# Patient Record
Sex: MALE | Race: WHITE | Employment: OTHER
[De-identification: names, ages, dates, MRNs, and addresses within clinical notes are randomized per-mention and may not be internally consistent; named-entity substitution may affect disease eponyms.]

---

## 2017-02-06 ENCOUNTER — OFFICE VISIT (OUTPATIENT)
Facility: CLINIC | Age: 62
End: 2017-02-06

## 2017-02-06 VITALS
SYSTOLIC BLOOD PRESSURE: 150 MMHG | WEIGHT: 259 LBS | RESPIRATION RATE: 16 BRPM | OXYGEN SATURATION: 98 % | DIASTOLIC BLOOD PRESSURE: 82 MMHG | HEART RATE: 67 BPM | BODY MASS INDEX: 37.16 KG/M2

## 2017-02-06 DIAGNOSIS — Z13.9 SCREENING: ICD-10-CM

## 2017-02-06 DIAGNOSIS — J44.9 CHRONIC OBSTRUCTIVE PULMONARY DISEASE, UNSPECIFIED COPD TYPE (HCC): ICD-10-CM

## 2017-02-06 DIAGNOSIS — E78.00 PURE HYPERCHOLESTEROLEMIA: ICD-10-CM

## 2017-02-06 DIAGNOSIS — F41.9 ANXIETY: ICD-10-CM

## 2017-02-06 DIAGNOSIS — R07.89 CHEST PRESSURE: ICD-10-CM

## 2017-02-06 DIAGNOSIS — R73.9 HYPERGLYCEMIA: ICD-10-CM

## 2017-02-06 DIAGNOSIS — K21.9 GASTROESOPHAGEAL REFLUX DISEASE WITHOUT ESOPHAGITIS: ICD-10-CM

## 2017-02-06 DIAGNOSIS — I10 ESSENTIAL HYPERTENSION: Primary | ICD-10-CM

## 2017-02-06 PROCEDURE — 99214 OFFICE O/P EST MOD 30 MIN: CPT | Performed by: NURSE PRACTITIONER

## 2017-02-06 ASSESSMENT — ENCOUNTER SYMPTOMS
RESPIRATORY NEGATIVE: 1
EYES NEGATIVE: 1
GASTROINTESTINAL NEGATIVE: 1
ALLERGIC/IMMUNOLOGIC NEGATIVE: 1

## 2017-08-04 ENCOUNTER — HOSPITAL ENCOUNTER (OUTPATIENT)
Age: 62
Setting detail: SPECIMEN
Discharge: HOME | End: 2017-08-04
Payer: COMMERCIAL

## 2017-08-04 DIAGNOSIS — E78.00 PURE HYPERCHOLESTEROLEMIA: ICD-10-CM

## 2017-08-04 DIAGNOSIS — I10 ESSENTIAL HYPERTENSION: ICD-10-CM

## 2017-08-04 DIAGNOSIS — Z13.9 SCREENING FOR CONDITION: ICD-10-CM

## 2017-08-04 DIAGNOSIS — R73.9 HYPERGLYCEMIA: ICD-10-CM

## 2017-08-04 LAB
ALBUMIN SERPL-MCNC: 4.7 G/DL (ref 3.5–5.2)
ALBUMIN/GLOBULIN RATIO: 1.6 (ref 1–2.5)
ALP BLD-CCNC: 70 U/L (ref 40–129)
ALT SERPL-CCNC: 36 U/L (ref 5–41)
ANION GAP SERPL CALCULATED.3IONS-SCNC: 14 MMOL/L (ref 9–17)
AST SERPL-CCNC: 31 U/L
BILIRUB SERPL-MCNC: 0.7 MG/DL (ref 0.3–1.2)
BUN BLDV-MCNC: 10 MG/DL (ref 8–23)
BUN/CREAT BLD: ABNORMAL (ref 9–20)
CALCIUM SERPL-MCNC: 9.3 MG/DL (ref 8.6–10.4)
CHLORIDE BLD-SCNC: 99 MMOL/L (ref 98–107)
CHOLESTEROL/HDL RATIO: 2.7
CHOLESTEROL: 188 MG/DL
CO2: 27 MMOL/L (ref 20–31)
CREAT SERPL-MCNC: 0.81 MG/DL (ref 0.7–1.2)
ESTIMATED AVERAGE GLUCOSE: 100 MG/DL
GFR AFRICAN AMERICAN: >60 ML/MIN
GFR NON-AFRICAN AMERICAN: >60 ML/MIN
GFR SERPL CREATININE-BSD FRML MDRD: ABNORMAL ML/MIN/{1.73_M2}
GFR SERPL CREATININE-BSD FRML MDRD: ABNORMAL ML/MIN/{1.73_M2}
GLUCOSE BLD-MCNC: 107 MG/DL (ref 70–99)
HBA1C MFR BLD: 5.1 % (ref 4–6)
HCT VFR BLD CALC: 45.6 % (ref 41–53)
HDLC SERPL-MCNC: 70 MG/DL
HEMOGLOBIN: 15.5 G/DL (ref 13.5–17.5)
LDL CHOLESTEROL: 93 MG/DL (ref 0–130)
MCH RBC QN AUTO: 31.6 PG (ref 26–34)
MCHC RBC AUTO-ENTMCNC: 33.9 G/DL (ref 31–37)
MCV RBC AUTO: 93 FL (ref 80–100)
PDW BLD-RTO: 13.2 % (ref 12.5–15.4)
PLATELET # BLD: 302 K/UL (ref 140–450)
PMV BLD AUTO: 8.1 FL (ref 6–12)
POTASSIUM SERPL-SCNC: 5 MMOL/L (ref 3.7–5.3)
PROSTATE SPECIFIC ANTIGEN: 1.41 UG/L
RBC # BLD: 4.9 M/UL (ref 4.5–5.9)
SODIUM BLD-SCNC: 140 MMOL/L (ref 135–144)
TOTAL PROTEIN: 7.7 G/DL (ref 6.4–8.3)
TRIGL SERPL-MCNC: 126 MG/DL
VLDLC SERPL CALC-MCNC: NORMAL MG/DL (ref 1–30)
WBC # BLD: 8.4 K/UL (ref 3.5–11)

## 2017-08-09 PROBLEM — H93.19 TINNITUS AURIUM: Status: ACTIVE | Noted: 2017-08-09

## 2017-08-09 PROBLEM — I10 ESSENTIAL HYPERTENSION: Status: ACTIVE | Noted: 2017-08-09

## 2017-08-09 PROBLEM — E78.00 PURE HYPERCHOLESTEROLEMIA: Status: ACTIVE | Noted: 2017-08-09

## 2017-08-09 PROBLEM — H91.93 BILATERAL HEARING LOSS: Status: ACTIVE | Noted: 2017-08-09

## 2017-08-09 PROBLEM — J44.9 CHRONIC OBSTRUCTIVE PULMONARY DISEASE (HCC): Status: ACTIVE | Noted: 2017-08-09

## 2017-09-22 ENCOUNTER — HOSPITAL ENCOUNTER (OUTPATIENT)
Age: 62
Discharge: HOME | End: 2017-09-22
Payer: COMMERCIAL

## 2017-09-22 ENCOUNTER — HOSPITAL ENCOUNTER (OUTPATIENT)
Dept: GENERAL RADIOLOGY | Age: 62
Discharge: HOME | End: 2017-09-22
Payer: COMMERCIAL

## 2017-09-22 DIAGNOSIS — J44.9 CHRONIC OBSTRUCTIVE PULMONARY DISEASE, UNSPECIFIED COPD TYPE (HCC): ICD-10-CM

## 2017-09-22 DIAGNOSIS — I10 ESSENTIAL HYPERTENSION: ICD-10-CM

## 2017-09-22 DIAGNOSIS — R07.89 CHEST PRESSURE: ICD-10-CM

## 2017-09-22 DIAGNOSIS — R05.9 COUGH: ICD-10-CM

## 2017-09-22 PROCEDURE — 71020 XR CHEST STANDARD TWO VW: CPT

## 2017-11-01 PROBLEM — J44.9 CHRONIC OBSTRUCTIVE PULMONARY DISEASE (HCC): Status: ACTIVE | Noted: 2017-11-01

## 2017-11-05 DIAGNOSIS — E78.00 PURE HYPERCHOLESTEROLEMIA: ICD-10-CM

## 2017-11-06 RX ORDER — SIMVASTATIN 20 MG
TABLET ORAL
Qty: 90 TABLET | Refills: 3 | Status: SHIPPED | OUTPATIENT
Start: 2017-11-06 | End: 2018-11-01 | Stop reason: SDUPTHER

## 2018-01-03 ENCOUNTER — TELEPHONE (OUTPATIENT)
Dept: PULMONOLOGY | Age: 63
End: 2018-01-03

## 2018-01-03 ENCOUNTER — OFFICE VISIT (OUTPATIENT)
Dept: PULMONOLOGY | Age: 63
End: 2018-01-03
Payer: COMMERCIAL

## 2018-01-03 VITALS
WEIGHT: 255 LBS | HEIGHT: 73 IN | BODY MASS INDEX: 35.7 KG/M2 | HEIGHT: 71 IN | OXYGEN SATURATION: 99 % | TEMPERATURE: 97.1 F | BODY MASS INDEX: 33.8 KG/M2 | SYSTOLIC BLOOD PRESSURE: 154 MMHG | DIASTOLIC BLOOD PRESSURE: 79 MMHG | HEART RATE: 76 BPM | RESPIRATION RATE: 14 BRPM | OXYGEN SATURATION: 99 % | HEART RATE: 79 BPM | WEIGHT: 255 LBS

## 2018-01-03 DIAGNOSIS — E66.9 OBESITY (BMI 30-39.9): ICD-10-CM

## 2018-01-03 DIAGNOSIS — J44.9 CHRONIC OBSTRUCTIVE PULMONARY DISEASE, UNSPECIFIED COPD TYPE (HCC): Primary | ICD-10-CM

## 2018-01-03 DIAGNOSIS — F17.211 NICOTINE DEPENDENCE, CIGARETTES, IN REMISSION: ICD-10-CM

## 2018-01-03 DIAGNOSIS — R06.09 DYSPNEA ON EXERTION: ICD-10-CM

## 2018-01-03 DIAGNOSIS — G47.33 OSA (OBSTRUCTIVE SLEEP APNEA): ICD-10-CM

## 2018-01-03 PROCEDURE — 94060 EVALUATION OF WHEEZING: CPT | Performed by: INTERNAL MEDICINE

## 2018-01-03 PROCEDURE — 94726 PLETHYSMOGRAPHY LUNG VOLUMES: CPT | Performed by: INTERNAL MEDICINE

## 2018-01-03 PROCEDURE — 99204 OFFICE O/P NEW MOD 45 MIN: CPT | Performed by: INTERNAL MEDICINE

## 2018-01-03 PROCEDURE — 94729 DIFFUSING CAPACITY: CPT | Performed by: INTERNAL MEDICINE

## 2018-01-03 PROCEDURE — G0296 VISIT TO DETERM LDCT ELIG: HCPCS | Performed by: INTERNAL MEDICINE

## 2018-01-03 ASSESSMENT — SLEEP AND FATIGUE QUESTIONNAIRES
ESS TOTAL SCORE: 0
HOW LIKELY ARE YOU TO NOD OFF OR FALL ASLEEP WHEN YOU ARE A PASSENGER IN A CAR FOR AN HOUR WITHOUT A BREAK: 0
HOW LIKELY ARE YOU TO NOD OFF OR FALL ASLEEP WHILE LYING DOWN TO REST IN THE AFTERNOON WHEN CIRCUMSTANCES PERMIT: 0
HOW LIKELY ARE YOU TO NOD OFF OR FALL ASLEEP IN A CAR, WHILE STOPPED FOR A FEW MINUTES IN TRAFFIC: 0
HOW LIKELY ARE YOU TO NOD OFF OR FALL ASLEEP WHILE WATCHING TV: 0
HOW LIKELY ARE YOU TO NOD OFF OR FALL ASLEEP WHILE SITTING AND TALKING TO SOMEONE: 0
HOW LIKELY ARE YOU TO NOD OFF OR FALL ASLEEP WHILE SITTING QUIETLY AFTER LUNCH WITHOUT ALCOHOL: 0
HOW LIKELY ARE YOU TO NOD OFF OR FALL ASLEEP WHILE SITTING INACTIVE IN A PUBLIC PLACE: 0
HOW LIKELY ARE YOU TO NOD OFF OR FALL ASLEEP WHILE SITTING AND READING: 0

## 2018-01-03 NOTE — PROGRESS NOTES
tiredness during the daytime and he denies any headache in the morning. He denies unrefreshed sleep he does have history of weight gain. He denies forgetfulness or decreased concentration. Goes to sleep at 9 pm, wakes up 6 am. It takes few minutes to fall asleep. Wakes up 2 times at night to go to bathroom. . Takes no nap during the day. No car wrecks or near wrecks because of the sleepiness. No nodding off while driving. ++ weight gain. No nasal congestion or obstruction at night. No numbness or burning in leg or feet. No leg aches or cramps . No loss of muscle strength when angry or laugh. No hallucination when dozing off or waking up from sleep. No paralysis upon awakening from sleep or going to sleep. No teeth grinding, nightmares, sleep walking. No night time panic attacks.      ESS is     Sleep Medicine 1/3/2018   Sitting and reading 0   Watching TV 0   Sitting, inactive in a public place (e.g. a theatre or a meeting) 0   As a passenger in a car for an hour without a break 0   Lying down to rest in the afternoon when circumstances permit 0   Sitting and talking to someone 0   Sitting quietly after a lunch without alcohol 0   In a car, while stopped for a few minutes in traffic 0   Total score 0       Past Medical History:        Diagnosis Date    Allergic rhinitis     Anxiety     Gilliam esophagus     Bleeding ulcer     COPD (chronic obstructive pulmonary disease) ([address]     Environmental allergies     GERD (gastroesophageal reflux disease)     Hyperlipidemia     Hyperplastic colon polyp     Hypertension     Perforated stomach ([address]     Tobacco abuse     Tubular adenoma of colon 1/2012     Dr. Antony Lowe colonoscopy       Past Surgical History:        Procedure Laterality Date    APPENDECTOMY      COLONOSCOPY      COLONOSCOPY  01/06/2012    STOMACH SURGERY      Perforated Stomach repair    TONSILLECTOMY      UMBILICAL HERNIA REPAIR  7/2011    UPPER GASTROINTESTINAL ENDOSCOPY Allergies: Allergies   Allergen Reactions    Prochlorperazine Edisylate      seizure    Reglan [Metoclopramide Hcl]      seizure         Home Meds:   Outpatient Encounter Prescriptions as of 1/3/2018   Medication Sig Dispense Refill    simvastatin (ZOCOR) 20 MG tablet TAKE 1 TABLET NIGHTLY 90 tablet 3    amLODIPine (NORVASC) 10 MG tablet TAKE 1 TABLET DAILY 90 tablet 3    lansoprazole (PREVACID) 30 MG delayed release capsule Take 1 capsule by mouth daily 90 capsule 3    fluticasone (FLONASE) 50 MCG/ACT nasal spray USE 2 SPRAYS NASALLY DAILY AS NEEDED FOR RHINITIS 48 g 5    ALPRAZolam (XANAX) 0.25 MG tablet Take 1 tablet by mouth every 12 hours as needed for Anxiety 180 tablet 0    aspirin 81 MG tablet Take 81 mg by mouth daily.  [DISCONTINUED] Omega-3 Fatty Acids (FISH OIL PO) Take by mouth      [DISCONTINUED] Tiotropium Bromide Monohydrate (SPIRIVA RESPIMAT) 2.5 MCG/ACT AERS Inhale 2 puffs into the lungs Daily 1 Inhaler 11     Facility-Administered Encounter Medications as of 1/3/2018   Medication Dose Route Frequency Provider Last Rate Last Dose    triamcinolone acetonide (KENALOG-40) injection 40 mg  40 mg Intramuscular Once Chrissy Stone CNP           Social History:   TOBACCO:   reports that he quit smoking about 3 years ago. He has a 40.00 pack-year smoking history. 1 PPD, He has never used smokeless tobacco.  ETOH:   reports that he drinks alcohol.   OCCUPATION:  Refinery worker operational coordinator, office work    Family History:       Problem Relation Age of Onset    Cancer Mother      thyroid    Thyroid Disease Sister     Heart Attack Paternal Grandfather        Immunizations:    Immunization History   Administered Date(s) Administered    Influenza Vaccine, unspecified formulation 11/07/2016    Influenza Virus Vaccine 10/30/2013, 10/15/2014, 10/16/2015, 10/25/2017    Pneumococcal Polysaccharide ([id number]) 12/08/2015    Tdap (Boostrix, Adacel) 06/06/2012         REVIEW OF SYSTEMS:  CONSTITUTIONAL:  negative for  fevers, chills, sweats, fatigue, malaise, anorexia and weight loss  EYES:  negative for  double vision, blurred vision, dry eyes, eye discharge, visual disturbance, irritation, redness and icterus  HEENT:  negative for  hearing loss, ear drainage, nasal congestion, epistaxis, sore mouth, sore throat and hoarseness  RESPIRATORY:  negative for  dyspnea, wheezing, hemoptysis, chest pain, pleuritic pain and cyanosis  CARDIOVASCULAR:  negative for  chest pain, palpitations, orthopnea, PND, exertional chest pressure/discomfort, fatigue, early saiety, edema, syncope  GASTROINTESTINAL:  positive for reflux  negative for nausea, vomiting, change in bowel habits, diarrhea, constipation, abdominal pain, abdominal mass, abdominal distention, dysphagia, regurgitation, odynophagia, hematemesis and hemtochezia  GENITOURINARY:  negative for frequency, dysuria, nocturia, urinary incontinence, hesitancy, decreased stream and hematuria  HEMATOLOGIC/LYMPHATIC:  negative for easy bruising, bleeding, lymphadenopathy and petechiae  ALLERGIC/IMMUNOLOGIC:  negative for recurrent infections, urticaria, hay fever, angioedema, anaphylaxis and drug reactions  ENDOCRINE:  negative for heat intolerance, cold intolerance, tremor, change in bowel habits and hair loss  MUSCULOSKELETAL:  negative for  myalgias, arthralgias, joint swelling, stiff joints and decreased range of motion  NEUROLOGICAL:  negative for headaches, dizziness, seizures, memory problems, speech problems, visual disturbance, coordination problems, gait problems, tremor, dysphagia, weakness, numbness, syncope and tingling  BEHAVIOR/PSYCH:  negative          Physical Exam:    Vitals: BP (!) 154/79 (Site: Left Arm, Position: Sitting)   Pulse 79   Temp 97.1 °F (36.2 °C)   Resp 14 Comment: room air  Ht 5' 11\" (1.803 m)   Wt 255 lb (115.7 kg)   SpO2 99%   BMI 35.57 kg/m²   Last 3 weights:    Wt Readings from Last 3 Encounters:   01/03/18 255 lb (115.7 kg)   01/03/18 255 lb (115.7 kg)   11/01/17 252 lb (114.3 kg)     Body mass index is 35.57 kg/m².     Physical Examination:   General appearance - alert, well appearing, and in no distress, overweight and acyanotic, in no respiratory distress  Mental status - alert, oriented to person, place, and time  Eyes - pupils equal and reactive, extraocular eye movements intact, sclera anicteric  Ears - right ear normal, left ear normal  Nose - normal and patent, no erythema, discharge or polyps, large tongue, small oropharynx, Mallampati 2  Mouth - mucous membranes moist, pharynx normal without lesions  Neck - supple, no significant adenopathy, very short and thick neck  Chest - clear to auscultation, no wheezes, rales or rhonchi, symmetric air entry, no tachypnea, retractions or cyanosis  Heart - normal rate, regular rhythm, normal S1, S2, no murmurs, rubs, clicks or gallops  Abdomen - soft, nontender, nondistended, no masses or organomegaly  Neurological - alert, oriented, normal speech, no focal findings or movement disorder noted}  Extremities - peripheral pulses normal, no pedal edema, no clubbing or cyanosis  Skin - normal coloration and turgor, no rashes, no suspicious skin lesions noted       LABS:    CBC:   WBC   Date Value Ref Range Status   08/04/2017 8.4 3.5 - 11.0 k/uL Final   07/28/2016 9.2 3.5 - 11.0 k/uL Final   01/19/2013 11.1 (H) 3.5 - 11.0 k/uL Final     Hemoglobin   Date Value Ref Range Status   08/04/2017 15.5 13.5 - 17.5 g/dL Final   07/28/2016 15.1 13.5 - 17.5 g/dL Final   01/19/2013 16.4 13.5 - 17.5 g/dL Final     Platelet Count   Date Value Ref Range Status   12/05/2011 302 140 - 450 k/uL Final     Platelets   Date Value Ref Range Status   08/04/2017 302 140 - 450 k/uL Final   07/28/2016 307 150 - 450 k/uL Final   01/19/2013 293 140 - 450 k/uL Final     BMP:   Sodium   Date Value Ref Range Status   08/04/2017 140 135 - 144 mmol/L Final   02/03/2017 142 135 - 144 mmol/L Final   07/28/2016 Comment:        Cholesterol Guidelines:      <200  Desirable   200-240  Borderline      >240  Undesirable          HDL   Date Value Ref Range Status   08/04/2017 70 >40 mg/dL Final     Comment:        HDL Guidelines:    <40     Undesirable   40-59    Borderline    >59     Desirable            INR: No results found for: INR  Thyroid: No results found for: TSH  Urinalysis: No results found for: BACTERIA, BLOODU, CLARITYU, COLORU, PHUR, PROTEINU, RBCUA, SPECGRAV, BILIRUBINUR, NITRU, WBCUA, LEUKOCYTESUR, GLUCOSEU  Cultures:-  -----------------------------------------------------------------    Immunization History   Administered Date(s) Administered    Influenza Vaccine, unspecified formulation 11/07/2016    Influenza Virus Vaccine 10/30/2013, 10/15/2014, 10/16/2015, 10/25/2017    Pneumococcal Polysaccharide ([id number]) 12/08/2015    Tdap (Boostrix, Adacel) 06/06/2012     ABGs: No results found for: PHART, PO2ART, [id number]    Pulmonary Functions Testing Results:    1/3/2017: FEV1 2.0 752%, FVC 3. 4/7/70 per, [id number] 74%, TLC 7.4 102%, DLCO 25.25  94%    CXR 9/22/2017  The cardiomediastinal silhouette is unremarkable.  Aortic vascular   calcification.  The lungs are clear.  No infiltrate, pleural fluid or   failure.  Healed left-sided rib fractures.  No acute osseous findings         CT Scans      EKG:   ECHO:   Stress Test:     Assessment and Plan       ICD-10-CM ICD-9-CM    1. Chronic obstructive pulmonary disease, unspecified COPD type ([facility] J44.9 496    2. Dyspnea on exertion R06.09 786.09    3. DEIRDRE (obstructive sleep apnea) G47.33 327.23    4. Obesity (BMI 30-39. 9) E66.9 278.00        Patient Active Problem List   Diagnosis    Allergic rhinitis    Gilliam esophagus    GERD (gastroesophageal reflux disease)    History of smoking    Essential hypertension    Pure hypercholesterolemia     Plan and recommendations    I had discussion with the patient today.   I reviewed pulmonary function test finding and that screen-detected lung cancer cases are over diagnosedthat is, the cancer would not have been detected in the patient's lifetime without the screening. Need for follow up screens annually to continue lung cancer screening effectiveness     Risks associated with radiation from annual LDCT- Radiation exposure is about the same as for a mammogram, which is about 1/3 of the annual background radiation exposure from everyday life. Starting screening at age 54 is not likely to increase cancer risk from radiation exposure. Patients with comorbidities resulting in life expectancy of < 10 years, or that would preclude treatment of an abnormality identified on CT, should not be screened due to lack of benefit.     To obtain maximal benefit from this screening, smoking cessation and long-term abstinence from smoking is critical

## 2018-01-04 ENCOUNTER — TELEPHONE (OUTPATIENT)
Dept: CASE MANAGEMENT | Age: 63
End: 2018-01-04

## 2018-01-04 NOTE — TELEPHONE ENCOUNTER
Order received for CT lung screening. EMR and order reviewed. Scheduling notified to contact patient and schedule. Information regarding ct lung screening mailed to patient.

## 2018-01-04 NOTE — TELEPHONE ENCOUNTER
Patient was seen in the office yesterday and we received a fax from [employer] script that Dr. Starla Crigler sent a script for Spiriva Respimat for one month supply with 11 refills and it needs to be a 3 month supply with 3 refills. New script is attached please sign.

## 2018-02-05 ENCOUNTER — HOSPITAL ENCOUNTER (OUTPATIENT)
Age: 63
Setting detail: SPECIMEN
Discharge: HOME | End: 2018-02-05
Payer: COMMERCIAL

## 2018-02-05 DIAGNOSIS — E78.00 PURE HYPERCHOLESTEROLEMIA: ICD-10-CM

## 2018-02-05 DIAGNOSIS — R73.9 HYPERGLYCEMIA: ICD-10-CM

## 2018-02-05 LAB
ALBUMIN SERPL-MCNC: 4.4 G/DL (ref 3.5–5.2)
ALBUMIN/GLOBULIN RATIO: 1.4 (ref 1–2.5)
ALP BLD-CCNC: 71 U/L (ref 40–129)
ALT SERPL-CCNC: 20 U/L (ref 5–41)
ANION GAP SERPL CALCULATED.3IONS-SCNC: 14 MMOL/L (ref 9–17)
AST SERPL-CCNC: 20 U/L
BILIRUB SERPL-MCNC: 1.01 MG/DL (ref 0.3–1.2)
BUN BLDV-MCNC: 17 MG/DL (ref 8–23)
BUN/CREAT BLD: ABNORMAL (ref 9–20)
CALCIUM SERPL-MCNC: 9.5 MG/DL (ref 8.6–10.4)
CHLORIDE BLD-SCNC: 98 MMOL/L (ref 98–107)
CHOLESTEROL/HDL RATIO: 2.6
CHOLESTEROL: 178 MG/DL
CO2: 27 MMOL/L (ref 20–31)
CREAT SERPL-MCNC: 0.76 MG/DL (ref 0.7–1.2)
ESTIMATED AVERAGE GLUCOSE: 91 MG/DL
GFR AFRICAN AMERICAN: >60 ML/MIN
GFR NON-AFRICAN AMERICAN: >60 ML/MIN
GFR SERPL CREATININE-BSD FRML MDRD: ABNORMAL ML/MIN/{1.73_M2}
GFR SERPL CREATININE-BSD FRML MDRD: ABNORMAL ML/MIN/{1.73_M2}
GLUCOSE BLD-MCNC: 107 MG/DL (ref 70–99)
HBA1C MFR BLD: 4.8 % (ref 4–6)
HDLC SERPL-MCNC: 69 MG/DL
LDL CHOLESTEROL: 81 MG/DL (ref 0–130)
POTASSIUM SERPL-SCNC: 4.5 MMOL/L (ref 3.7–5.3)
SODIUM BLD-SCNC: 139 MMOL/L (ref 135–144)
TOTAL PROTEIN: 7.5 G/DL (ref 6.4–8.3)
TRIGL SERPL-MCNC: 139 MG/DL
VLDLC SERPL CALC-MCNC: NORMAL MG/DL (ref 1–30)

## 2018-02-09 PROBLEM — C44.619 BASAL CELL CARCINOMA OF LEFT UPPER ARM: Status: ACTIVE | Noted: 2018-02-09

## 2018-02-09 PROBLEM — M25.511 ACUTE PAIN OF RIGHT SHOULDER: Status: ACTIVE | Noted: 2018-02-09

## 2018-03-26 ENCOUNTER — HOSPITAL ENCOUNTER (OUTPATIENT)
Dept: CT IMAGING | Age: 63
Discharge: HOME | End: 2018-03-28
Payer: COMMERCIAL

## 2018-03-26 DIAGNOSIS — F17.211 NICOTINE DEPENDENCE, CIGARETTES, IN REMISSION: ICD-10-CM

## 2018-03-26 PROCEDURE — G0297 LDCT FOR LUNG CA SCREEN: HCPCS

## 2018-03-29 ENCOUNTER — TELEPHONE (OUTPATIENT)
Dept: CASE MANAGEMENT | Age: 63
End: 2018-03-29

## 2018-03-29 NOTE — TELEPHONE ENCOUNTER
Ct lung screening complete lungRADS 3: recommend 6m f/u. Noted patient has ov with Dr. Angelica Andrew 4/3/18.   Letter to patient cc to provider

## 2018-04-03 ENCOUNTER — OFFICE VISIT (OUTPATIENT)
Dept: PULMONOLOGY | Age: 63
End: 2018-04-03
Payer: COMMERCIAL

## 2018-04-03 VITALS
BODY MASS INDEX: 36.12 KG/M2 | RESPIRATION RATE: 15 BRPM | OXYGEN SATURATION: 94 % | SYSTOLIC BLOOD PRESSURE: 143 MMHG | HEIGHT: 71 IN | WEIGHT: 258 LBS | TEMPERATURE: 98.7 F | HEART RATE: 78 BPM | DIASTOLIC BLOOD PRESSURE: 91 MMHG

## 2018-04-03 DIAGNOSIS — Z87.891 EX-SMOKER: ICD-10-CM

## 2018-04-03 DIAGNOSIS — J44.9 CHRONIC OBSTRUCTIVE PULMONARY DISEASE, UNSPECIFIED COPD TYPE (HCC): Primary | ICD-10-CM

## 2018-04-03 DIAGNOSIS — R91.1 LUNG NODULE: ICD-10-CM

## 2018-04-03 PROCEDURE — 99214 OFFICE O/P EST MOD 30 MIN: CPT | Performed by: INTERNAL MEDICINE

## 2018-08-11 ENCOUNTER — HOSPITAL ENCOUNTER (OUTPATIENT)
Age: 63
Discharge: HOME | End: 2018-08-11
Payer: COMMERCIAL

## 2018-08-11 DIAGNOSIS — R73.9 HYPERGLYCEMIA: ICD-10-CM

## 2018-08-11 DIAGNOSIS — I10 ESSENTIAL HYPERTENSION: ICD-10-CM

## 2018-08-11 DIAGNOSIS — Z12.5 PROSTATE CANCER SCREENING: ICD-10-CM

## 2018-08-11 DIAGNOSIS — E78.00 PURE HYPERCHOLESTEROLEMIA: ICD-10-CM

## 2018-08-11 LAB
ALBUMIN SERPL-MCNC: 4.4 G/DL (ref 3.5–5.2)
ALBUMIN/GLOBULIN RATIO: NORMAL (ref 1–2.5)
ALP BLD-CCNC: 75 U/L (ref 40–129)
ALT SERPL-CCNC: 36 U/L (ref 5–41)
ANION GAP SERPL CALCULATED.3IONS-SCNC: 12 MMOL/L (ref 9–17)
AST SERPL-CCNC: 37 U/L
BILIRUB SERPL-MCNC: 0.76 MG/DL (ref 0.3–1.2)
BUN BLDV-MCNC: 10 MG/DL (ref 8–23)
BUN/CREAT BLD: NORMAL (ref 9–20)
CALCIUM SERPL-MCNC: 9.7 MG/DL (ref 8.6–10.4)
CHLORIDE BLD-SCNC: 101 MMOL/L (ref 98–107)
CHOLESTEROL/HDL RATIO: 2.9
CHOLESTEROL: 184 MG/DL
CO2: 27 MMOL/L (ref 20–31)
CREAT SERPL-MCNC: 0.87 MG/DL (ref 0.7–1.2)
GFR AFRICAN AMERICAN: >60 ML/MIN
GFR NON-AFRICAN AMERICAN: >60 ML/MIN
GFR SERPL CREATININE-BSD FRML MDRD: NORMAL ML/MIN/{1.73_M2}
GFR SERPL CREATININE-BSD FRML MDRD: NORMAL ML/MIN/{1.73_M2}
GLUCOSE BLD-MCNC: 99 MG/DL (ref 70–99)
HCT VFR BLD CALC: 44 % (ref 41–53)
HDLC SERPL-MCNC: 64 MG/DL
HEMOGLOBIN: 15.3 G/DL (ref 13.5–17.5)
LDL CHOLESTEROL: 97 MG/DL (ref 0–130)
MCH RBC QN AUTO: 32.9 PG (ref 26–34)
MCHC RBC AUTO-ENTMCNC: 34.7 G/DL (ref 31–37)
MCV RBC AUTO: 94.6 FL (ref 80–100)
NRBC AUTOMATED: NORMAL PER 100 WBC
PDW BLD-RTO: 12.5 % (ref 11.5–14.9)
PLATELET # BLD: 300 K/UL (ref 150–450)
PMV BLD AUTO: 7.4 FL (ref 6–12)
POTASSIUM SERPL-SCNC: 4.9 MMOL/L (ref 3.7–5.3)
PROSTATE SPECIFIC ANTIGEN: 1.13 UG/L
RBC # BLD: 4.65 M/UL (ref 4.5–5.9)
SODIUM BLD-SCNC: 140 MMOL/L (ref 135–144)
TOTAL PROTEIN: 7.5 G/DL (ref 6.4–8.3)
TRIGL SERPL-MCNC: 117 MG/DL
VLDLC SERPL CALC-MCNC: NORMAL MG/DL (ref 1–30)
WBC # BLD: 7.3 K/UL (ref 3.5–11)

## 2018-08-11 PROCEDURE — 80061 LIPID PANEL: CPT

## 2018-08-11 PROCEDURE — 83036 HEMOGLOBIN GLYCOSYLATED A1C: CPT

## 2018-08-11 PROCEDURE — G0103 PSA SCREENING: HCPCS

## 2018-08-11 PROCEDURE — 80053 COMPREHEN METABOLIC PANEL: CPT

## 2018-08-11 PROCEDURE — 85027 COMPLETE CBC AUTOMATED: CPT

## 2018-08-11 PROCEDURE — 36415 COLL VENOUS BLD VENIPUNCTURE: CPT

## 2018-08-12 LAB
ESTIMATED AVERAGE GLUCOSE: 88 MG/DL
HBA1C MFR BLD: 4.7 % (ref 4–6)

## 2018-08-14 PROBLEM — J30.1 SEASONAL ALLERGIC RHINITIS DUE TO POLLEN: Status: ACTIVE | Noted: 2018-08-14

## 2018-08-14 PROBLEM — K43.9 VENTRAL HERNIA WITHOUT OBSTRUCTION OR GANGRENE: Status: ACTIVE | Noted: 2018-08-14

## 2018-08-14 PROBLEM — R91.8 PULMONARY NODULES: Status: ACTIVE | Noted: 2018-08-14

## 2018-08-14 PROBLEM — B00.1 COLD SORE: Status: ACTIVE | Noted: 2018-08-14

## 2018-09-06 ENCOUNTER — HOSPITAL ENCOUNTER (OUTPATIENT)
Dept: CT IMAGING | Age: 63
Discharge: HOME | End: 2018-09-08
Payer: COMMERCIAL

## 2018-09-06 DIAGNOSIS — R19.00 ABDOMINAL WALL BULGE: ICD-10-CM

## 2018-09-06 PROCEDURE — 6360000004 HC RX CONTRAST MEDICATION: Performed by: SURGERY

## 2018-09-06 PROCEDURE — 2580000003 HC RX 258: Performed by: SURGERY

## 2018-09-06 PROCEDURE — 74177 CT ABD & PELVIS W/CONTRAST: CPT

## 2018-09-06 RX ORDER — 0.9 % SODIUM CHLORIDE 0.9 %
80 INTRAVENOUS SOLUTION INTRAVENOUS ONCE
Status: COMPLETED | OUTPATIENT
Start: 2018-09-06 | End: 2018-09-06

## 2018-09-06 RX ORDER — SODIUM CHLORIDE 0.9 % (FLUSH) 0.9 %
10 SYRINGE (ML) INJECTION PRN
Status: DISCONTINUED | OUTPATIENT
Start: 2018-09-06 | End: 2018-09-09 | Stop reason: HOSPADM

## 2018-09-06 RX ADMIN — IOHEXOL 50 ML: 240 INJECTION, SOLUTION INTRATHECAL; INTRAVASCULAR; INTRAVENOUS; ORAL at 12:14

## 2018-09-06 RX ADMIN — IOPAMIDOL 75 ML: 755 INJECTION, SOLUTION INTRAVENOUS at 12:14

## 2018-09-06 RX ADMIN — Medication 10 ML: at 12:14

## 2018-09-06 RX ADMIN — SODIUM CHLORIDE 80 ML: 9 INJECTION, SOLUTION INTRAVENOUS at 12:14

## 2018-09-24 ENCOUNTER — HOSPITAL ENCOUNTER (OUTPATIENT)
Dept: CT IMAGING | Age: 63
Discharge: HOME | End: 2018-09-26
Payer: COMMERCIAL

## 2018-09-24 DIAGNOSIS — R91.1 LUNG NODULE: ICD-10-CM

## 2018-09-24 PROCEDURE — 71250 CT THORAX DX C-: CPT

## 2018-09-25 ENCOUNTER — TELEPHONE (OUTPATIENT)
Dept: PULMONOLOGY | Age: 63
End: 2018-09-25

## 2018-09-25 NOTE — TELEPHONE ENCOUNTER
Received a denial for CT scan that patient had done yesterday. I spoke to Lorna Harrison in [place] and he is checking into this. He will contact me if I need to do anything.

## 2018-09-26 ENCOUNTER — TELEPHONE (OUTPATIENT)
Dept: ONCOLOGY | Age: 63
End: 2018-09-26

## 2018-10-02 ENCOUNTER — HOSPITAL ENCOUNTER (OUTPATIENT)
Age: 63
Discharge: HOME | End: 2018-10-02
Payer: COMMERCIAL

## 2018-10-02 LAB
EKG ATRIAL RATE: 67 BPM
EKG P AXIS: 62 DEGREES
EKG P-R INTERVAL: 154 MS
EKG Q-T INTERVAL: 418 MS
EKG QRS DURATION: 98 MS
EKG QTC CALCULATION (BAZETT): 441 MS
EKG R AXIS: 80 DEGREES
EKG T AXIS: 64 DEGREES
EKG VENTRICULAR RATE: 67 BPM

## 2018-10-02 PROCEDURE — 93005 ELECTROCARDIOGRAM TRACING: CPT

## 2018-10-30 ENCOUNTER — OFFICE VISIT (OUTPATIENT)
Dept: PULMONOLOGY | Age: 63
End: 2018-10-30
Payer: COMMERCIAL

## 2018-10-30 VITALS
HEART RATE: 82 BPM | SYSTOLIC BLOOD PRESSURE: 139 MMHG | DIASTOLIC BLOOD PRESSURE: 81 MMHG | HEIGHT: 71 IN | WEIGHT: 256 LBS | BODY MASS INDEX: 35.84 KG/M2 | OXYGEN SATURATION: 97 % | RESPIRATION RATE: 14 BRPM

## 2018-10-30 DIAGNOSIS — R91.1 LUNG NODULE: ICD-10-CM

## 2018-10-30 DIAGNOSIS — G47.33 OBSTRUCTIVE SLEEP APNEA: ICD-10-CM

## 2018-10-30 DIAGNOSIS — J44.9 CHRONIC OBSTRUCTIVE PULMONARY DISEASE, UNSPECIFIED COPD TYPE (HCC): Primary | ICD-10-CM

## 2018-10-30 PROCEDURE — 99214 OFFICE O/P EST MOD 30 MIN: CPT | Performed by: INTERNAL MEDICINE

## 2018-10-30 ASSESSMENT — SLEEP AND FATIGUE QUESTIONNAIRES
HOW LIKELY ARE YOU TO NOD OFF OR FALL ASLEEP WHILE SITTING QUIETLY AFTER LUNCH WITHOUT ALCOHOL: 0
HOW LIKELY ARE YOU TO NOD OFF OR FALL ASLEEP WHEN YOU ARE A PASSENGER IN A CAR FOR AN HOUR WITHOUT A BREAK: 0
HOW LIKELY ARE YOU TO NOD OFF OR FALL ASLEEP WHILE SITTING INACTIVE IN A PUBLIC PLACE: 0
ESS TOTAL SCORE: 0
HOW LIKELY ARE YOU TO NOD OFF OR FALL ASLEEP WHILE WATCHING TV: 0
HOW LIKELY ARE YOU TO NOD OFF OR FALL ASLEEP IN A CAR, WHILE STOPPED FOR A FEW MINUTES IN TRAFFIC: 0
HOW LIKELY ARE YOU TO NOD OFF OR FALL ASLEEP WHILE SITTING AND READING: 0
HOW LIKELY ARE YOU TO NOD OFF OR FALL ASLEEP WHILE LYING DOWN TO REST IN THE AFTERNOON WHEN CIRCUMSTANCES PERMIT: 0
HOW LIKELY ARE YOU TO NOD OFF OR FALL ASLEEP WHILE SITTING AND TALKING TO SOMEONE: 0

## 2018-10-30 NOTE — PROGRESS NOTES
OUTPATIENT PULMONARY PROGRESS NOTE      Patient:  Brandin Mobley  MRN: [id number]    Consulting Physician: Damion Godinez  Reason for Consult: COPD  [facility] Care Physician: Damion Godinez, VIKY - CNP    HISTORY OF PRESENT ILLNESS:   The patient is a 70 y.o. male   Since she was seen last time he did not have the sleep study done and it was requested the time before. According to patient he was having sinus problems and he was not able to get the sleep study done and he had sinus surgery done in September according to patient he has septoplasty done and irrigation, after sinus surgery he feels better his right-sided nasal obstruction which was present before is improved and he is using a nasal rinse is not on any nasal steroids. He denies chronic cough and daily persistent cough or sputum production, denies wheezing, he denies nocturnal symptoms of cough wheezing and shortness of breath or chest tightness. He is using her Spiriva Respimat once daily   She had initially a screening CT scan done which shows right upper lobe and right middle lobe nodule in April and for follow-up he had a CT scan of the chest done for follow-up of lung nodule. Initial visit/previous visits summary  He was referred here for evaluation of COPD and he was scheduled for a screening CT scan of the chest and also for sleep study. He was diagnosed with stage II COPD at that time and he was restarted on his Spiriva to spray once daily   He denies daily and persistent chronic cough and since he started using Spiriva despite and he use to have which was brown in color is more lighter and yellowish. Positive post nasal drip and nasal obstruction on R side and on Flonase nasal spray   He never was diagnosed with obstructive sleep apnea but he was told by his wife that he does have snoring he occasionally wake himself with snoring. He denies having any witnessed apnea sometimes wake up with choking sensation.   He does have dry mouth when he wake ECHO:   Stress Test:     Assessment and Plan       ICD-10-CM    1. Chronic obstructive pulmonary disease, unspecified COPD type ([facility] J44.9    2. Lung nodule R91.1    3. Obstructive sleep apnea G47.33        Patient Active Problem List   Diagnosis    Allergic rhinitis    Gilliam esophagus    GERD (gastroesophageal reflux disease)    History of smoking    Essential hypertension    Pure hypercholesterolemia     I have discussed the finding of the CT scan of the chest with the patient, he has a 6-7 mm pleural-based nodular thickening which was reported to be 7 mm by radiology and also there is a 5 mm right middle lobe nodule, both are stable as compared to CT scan of the chest from April 2018. He will need yearly screening CT scan because of his history of smoking. Plan and recommendations      Continue Spiriva Respimat 2 puff once daily   Albuterol to be used as needed  Vaccinations recommended  and had Pneumonia vaccine 2 years ago  He is up-to-date on pneumonia vaccine  He is getting Flu vaccine today at work ( free for him)    Request patient to reschedule Sleep study   CPAP titration if needed depending on the baseline study  Wt loss is recommended and discussed  Increase activity and exercise discussed with the patient   Follow good sleep hygeine instructions  Given sleep hygeine instructions    He will need annual screening CT scan of the chest done next should be October 2019     Questions answered pertaining to diagnosis and management explained importance of compliance with therapy     RTC 6 months. It was my pleasure to evaluate Ananya Broderick today. Please call with questions. Bettie Harp MD             10/30/2018, 9:37 AM      Please note that this chart was generated using voice recognition Dragon dictation software. Although every effort was made to ensure the accuracy of this automated transcription, some errors in transcription may have occurred.

## 2018-11-01 DIAGNOSIS — E78.00 PURE HYPERCHOLESTEROLEMIA: ICD-10-CM

## 2018-11-01 RX ORDER — SIMVASTATIN 20 MG
TABLET ORAL
Qty: 90 TABLET | Refills: 3 | Status: SHIPPED | OUTPATIENT
Start: 2018-11-01 | End: 2019-10-27 | Stop reason: SDUPTHER

## 2018-11-01 NOTE — TELEPHONE ENCOUNTER
Next Visit Date:  Future Appointments  Date Time Provider Janessa Jennifer   2/12/2019 8:30 AM Karen Mills, APRN - CNP [place]  [facility]   4/30/2019 10:30 AM Frederic Earl  [address] Maintenance   Topic Date Due    Shingles Vaccine (1 of 2 - 2 Dose Series) 06/04/2003    Hepatitis C screen  08/01/2021 (Originally 1955)    HIV screen  08/01/2019 (Originally 6/4/1970)    Potassium monitoring  08/11/2019    Creatinine monitoring  08/11/2019    Low dose CT lung screening  09/24/2019    Pneumococcal low/med risk (2 of 2 - PPSV23) 12/08/2020    Colon cancer screen colonoscopy  01/09/2022    DTaP/Tdap/Td vaccine (2 - Td) 06/06/2022    Lipid screen  08/11/2023    Flu vaccine  Completed    AAA screen  Completed       Hemoglobin A1C (%)   Date Value   08/11/2018 4.7   02/05/2018 4.8   08/04/2017 5.1             ( goal A1C is < 7)   No results found for: LABMICR  LDL Cholesterol (mg/dL)   Date Value   08/11/2018 97   02/05/2018 81       (goal LDL is <100)   AST (U/L)   Date Value   08/11/2018 37     ALT (U/L)   Date Value   08/11/2018 36     BUN (mg/dL)   Date Value   08/11/2018 10     BP Readings from Last 3 Encounters:   10/30/18 139/81   08/14/18 130/80   04/03/18 (!) 143/91          (goal 120/80)    All Future Testing planned in [facility]  Lab Frequency Next Occurrence   Comprehensive Metabolic Panel Once 64/98/3904   Lipid Panel Once 11/15/2018   CBC Once 11/15/2018   Nasal cannula oxygen                 Patient Active Problem List:     Allergic rhinitis     Anxiety     Hyperplastic colon polyp     Gilliam's esophagus without dysplasia     GERD (gastroesophageal reflux disease)     Bleeding ulcer     Environmental allergies     Tubular adenoma of colon     History of smoking     Essential hypertension     Pure hypercholesterolemia     Chronic obstructive pulmonary disease (HCC)     Bilateral hearing loss     Tinnitus aurium     Acute pain of right shoulder     Basal cell

## 2019-01-12 ENCOUNTER — HOSPITAL ENCOUNTER (OUTPATIENT)
Age: 64
Discharge: HOME | End: 2019-01-12
Payer: COMMERCIAL

## 2019-01-12 DIAGNOSIS — E78.00 PURE HYPERCHOLESTEROLEMIA: ICD-10-CM

## 2019-01-12 DIAGNOSIS — I10 ESSENTIAL HYPERTENSION: ICD-10-CM

## 2019-01-12 LAB
ALBUMIN SERPL-MCNC: 4.2 G/DL (ref 3.5–5.2)
ALBUMIN/GLOBULIN RATIO: ABNORMAL (ref 1–2.5)
ALP BLD-CCNC: 80 U/L (ref 40–129)
ALT SERPL-CCNC: 25 U/L (ref 5–41)
ANION GAP SERPL CALCULATED.3IONS-SCNC: 11 MMOL/L (ref 9–17)
AST SERPL-CCNC: 23 U/L
BILIRUB SERPL-MCNC: 0.94 MG/DL (ref 0.3–1.2)
BUN BLDV-MCNC: 15 MG/DL (ref 8–23)
BUN/CREAT BLD: ABNORMAL (ref 9–20)
CALCIUM SERPL-MCNC: 10 MG/DL (ref 8.6–10.4)
CHLORIDE BLD-SCNC: 102 MMOL/L (ref 98–107)
CHOLESTEROL/HDL RATIO: 3.5
CHOLESTEROL: 191 MG/DL
CO2: 28 MMOL/L (ref 20–31)
CREAT SERPL-MCNC: 0.96 MG/DL (ref 0.7–1.2)
GFR AFRICAN AMERICAN: >60 ML/MIN
GFR NON-AFRICAN AMERICAN: >60 ML/MIN
GFR SERPL CREATININE-BSD FRML MDRD: ABNORMAL ML/MIN/{1.73_M2}
GFR SERPL CREATININE-BSD FRML MDRD: ABNORMAL ML/MIN/{1.73_M2}
GLUCOSE BLD-MCNC: 101 MG/DL (ref 70–99)
HCT VFR BLD CALC: 46.8 % (ref 41–53)
HDLC SERPL-MCNC: 55 MG/DL
HEMOGLOBIN: 15.7 G/DL (ref 13.5–17.5)
LDL CHOLESTEROL: 98 MG/DL (ref 0–130)
MCH RBC QN AUTO: 31.7 PG (ref 26–34)
MCHC RBC AUTO-ENTMCNC: 33.6 G/DL (ref 31–37)
MCV RBC AUTO: 94.4 FL (ref 80–100)
NRBC AUTOMATED: NORMAL PER 100 WBC
PDW BLD-RTO: 12.7 % (ref 11.5–14.9)
PLATELET # BLD: 288 K/UL (ref 150–450)
PMV BLD AUTO: 7.6 FL (ref 6–12)
POTASSIUM SERPL-SCNC: 5 MMOL/L (ref 3.7–5.3)
RBC # BLD: 4.96 M/UL (ref 4.5–5.9)
SODIUM BLD-SCNC: 141 MMOL/L (ref 135–144)
TOTAL PROTEIN: 7.6 G/DL (ref 6.4–8.3)
TRIGL SERPL-MCNC: 188 MG/DL
VLDLC SERPL CALC-MCNC: ABNORMAL MG/DL (ref 1–30)
WBC # BLD: 7.2 K/UL (ref 3.5–11)

## 2019-01-12 PROCEDURE — 80053 COMPREHEN METABOLIC PANEL: CPT

## 2019-01-12 PROCEDURE — 36415 COLL VENOUS BLD VENIPUNCTURE: CPT

## 2019-01-12 PROCEDURE — 85027 COMPLETE CBC AUTOMATED: CPT

## 2019-01-12 PROCEDURE — 80061 LIPID PANEL: CPT

## 2019-01-22 PROBLEM — R22.41 MASS OF RIGHT THIGH: Status: ACTIVE | Noted: 2019-01-22

## 2019-01-29 ENCOUNTER — HOSPITAL ENCOUNTER (OUTPATIENT)
Dept: GENERAL RADIOLOGY | Age: 64
Discharge: HOME | End: 2019-01-31
Payer: COMMERCIAL

## 2019-01-29 ENCOUNTER — HOSPITAL ENCOUNTER (OUTPATIENT)
Dept: MRI IMAGING | Age: 64
Discharge: HOME | End: 2019-01-31
Payer: COMMERCIAL

## 2019-01-29 ENCOUNTER — HOSPITAL ENCOUNTER (OUTPATIENT)
Age: 64
Discharge: HOME | End: 2019-01-31
Payer: COMMERCIAL

## 2019-01-29 DIAGNOSIS — M25.561 CHRONIC PAIN OF RIGHT KNEE: ICD-10-CM

## 2019-01-29 DIAGNOSIS — R22.41 MASS OF RIGHT THIGH: ICD-10-CM

## 2019-01-29 DIAGNOSIS — G89.29 CHRONIC PAIN OF RIGHT KNEE: ICD-10-CM

## 2019-01-29 PROBLEM — M85.861 OSTEOPENIA OF RIGHT LOWER LEG: Status: ACTIVE | Noted: 2019-01-29

## 2019-01-29 PROCEDURE — 73718 MRI LOWER EXTREMITY W/O DYE: CPT

## 2019-01-29 PROCEDURE — 73562 X-RAY EXAM OF KNEE 3: CPT

## 2019-02-01 ENCOUNTER — HOSPITAL ENCOUNTER (OUTPATIENT)
Dept: WOMENS IMAGING | Age: 64
Discharge: HOME | End: 2019-02-03
Payer: COMMERCIAL

## 2019-02-01 DIAGNOSIS — M85.861 OSTEOPENIA OF RIGHT LOWER LEG: ICD-10-CM

## 2019-02-01 PROCEDURE — 77080 DXA BONE DENSITY AXIAL: CPT

## 2019-02-06 ENCOUNTER — OFFICE VISIT (OUTPATIENT)
Dept: ORTHOPEDIC SURGERY | Age: 64
End: 2019-02-06
Payer: COMMERCIAL

## 2019-02-06 VITALS — HEART RATE: 75 BPM | HEIGHT: 70 IN | BODY MASS INDEX: 36.51 KG/M2 | OXYGEN SATURATION: 98 % | WEIGHT: 255 LBS

## 2019-02-06 DIAGNOSIS — M25.561 ACUTE PAIN OF RIGHT KNEE: Primary | ICD-10-CM

## 2019-02-06 PROCEDURE — 99203 OFFICE O/P NEW LOW 30 MIN: CPT | Performed by: ORTHOPAEDIC SURGERY

## 2019-02-13 PROBLEM — M25.561 CHRONIC PAIN OF RIGHT KNEE: Status: ACTIVE | Noted: 2019-02-13

## 2019-02-13 PROBLEM — G89.29 CHRONIC PAIN OF RIGHT KNEE: Status: ACTIVE | Noted: 2019-02-13

## 2019-03-05 RX ORDER — TIOTROPIUM BROMIDE INHALATION SPRAY 3.12 UG/1
SPRAY, METERED RESPIRATORY (INHALATION)
Qty: 3 INHALER | Refills: 0 | Status: SHIPPED | OUTPATIENT
Start: 2019-03-05 | End: 2019-10-29 | Stop reason: SDUPTHER

## 2019-03-26 ENCOUNTER — OFFICE VISIT (OUTPATIENT)
Dept: GASTROENTEROLOGY | Age: 64
End: 2019-03-26
Payer: COMMERCIAL

## 2019-03-26 VITALS
HEIGHT: 70 IN | SYSTOLIC BLOOD PRESSURE: 156 MMHG | HEART RATE: 83 BPM | WEIGHT: 266 LBS | BODY MASS INDEX: 38.08 KG/M2 | DIASTOLIC BLOOD PRESSURE: 78 MMHG

## 2019-03-26 DIAGNOSIS — K21.9 GASTROESOPHAGEAL REFLUX DISEASE, ESOPHAGITIS PRESENCE NOT SPECIFIED: Primary | ICD-10-CM

## 2019-03-26 DIAGNOSIS — D12.6 TUBULAR ADENOMA OF COLON: ICD-10-CM

## 2019-03-26 PROCEDURE — 99244 OFF/OP CNSLTJ NEW/EST MOD 40: CPT | Performed by: INTERNAL MEDICINE

## 2019-03-26 ASSESSMENT — ENCOUNTER SYMPTOMS
DIARRHEA: 0
SHORTNESS OF BREATH: 0
BACK PAIN: 0
BLOOD IN STOOL: 0
EYE PAIN: 0
SINUS PRESSURE: 0
ABDOMINAL DISTENTION: 0
RECTAL PAIN: 0
WHEEZING: 0
SINUS PAIN: 0
ABDOMINAL PAIN: 0
CONSTIPATION: 0
VOMITING: 0
NAUSEA: 0
ANAL BLEEDING: 0
EYE REDNESS: 0
CHEST TIGHTNESS: 0
TROUBLE SWALLOWING: 0

## 2019-03-27 ENCOUNTER — TELEPHONE (OUTPATIENT)
Dept: GASTROENTEROLOGY | Age: 64
End: 2019-03-27

## 2019-03-27 RX ORDER — POLYETHYLENE GLYCOL 3350 17 G/17G
POWDER, FOR SOLUTION ORAL
Qty: 255 G | Refills: 0 | Status: SHIPPED | OUTPATIENT
Start: 2019-03-27 | End: 2019-04-25

## 2019-04-03 RX ORDER — POLYETHYLENE GLYCOL 3350 17 G/17G
POWDER, FOR SOLUTION ORAL
Qty: 255 G | Refills: 0 | Status: SHIPPED | OUTPATIENT
Start: 2019-04-03 | End: 2019-05-03

## 2019-04-17 ENCOUNTER — HOSPITAL ENCOUNTER (OUTPATIENT)
Dept: PREADMISSION TESTING | Age: 64
Discharge: HOME | End: 2019-04-21
Payer: COMMERCIAL

## 2019-04-17 VITALS
WEIGHT: 264 LBS | BODY MASS INDEX: 37.8 KG/M2 | RESPIRATION RATE: 16 BRPM | SYSTOLIC BLOOD PRESSURE: 158 MMHG | TEMPERATURE: 98.4 F | DIASTOLIC BLOOD PRESSURE: 81 MMHG | HEIGHT: 70 IN | HEART RATE: 71 BPM

## 2019-04-17 LAB
ABSOLUTE EOS #: 0.2 K/UL (ref 0–0.4)
ABSOLUTE IMMATURE GRANULOCYTE: ABNORMAL K/UL (ref 0–0.3)
ABSOLUTE LYMPH #: 1.8 K/UL (ref 1–4.8)
ABSOLUTE MONO #: 0.9 K/UL (ref 0.1–1.3)
ANION GAP SERPL CALCULATED.3IONS-SCNC: 12 MMOL/L (ref 9–17)
BASOPHILS # BLD: 1 % (ref 0–2)
BASOPHILS ABSOLUTE: 0 K/UL (ref 0–0.2)
BUN BLDV-MCNC: 17 MG/DL (ref 8–23)
BUN/CREAT BLD: ABNORMAL (ref 9–20)
CALCIUM SERPL-MCNC: 9.1 MG/DL (ref 8.6–10.4)
CHLORIDE BLD-SCNC: 102 MMOL/L (ref 98–107)
CO2: 25 MMOL/L (ref 20–31)
CREAT SERPL-MCNC: 0.91 MG/DL (ref 0.7–1.2)
DIFFERENTIAL TYPE: ABNORMAL
EOSINOPHILS RELATIVE PERCENT: 3 % (ref 0–4)
GFR AFRICAN AMERICAN: >60 ML/MIN
GFR NON-AFRICAN AMERICAN: >60 ML/MIN
GFR SERPL CREATININE-BSD FRML MDRD: ABNORMAL ML/MIN/{1.73_M2}
GFR SERPL CREATININE-BSD FRML MDRD: ABNORMAL ML/MIN/{1.73_M2}
GLUCOSE BLD-MCNC: 103 MG/DL (ref 70–99)
HCT VFR BLD CALC: 44 % (ref 41–53)
HEMOGLOBIN: 14.9 G/DL (ref 13.5–17.5)
IMMATURE GRANULOCYTES: ABNORMAL %
LYMPHOCYTES # BLD: 24 % (ref 24–44)
MCH RBC QN AUTO: 31.8 PG (ref 26–34)
MCHC RBC AUTO-ENTMCNC: 33.8 G/DL (ref 31–37)
MCV RBC AUTO: 93.9 FL (ref 80–100)
MONOCYTES # BLD: 13 % (ref 1–7)
NRBC AUTOMATED: ABNORMAL PER 100 WBC
PDW BLD-RTO: 12.5 % (ref 11.5–14.9)
PLATELET # BLD: 290 K/UL (ref 150–450)
PLATELET ESTIMATE: ABNORMAL
PMV BLD AUTO: 7.3 FL (ref 6–12)
POTASSIUM SERPL-SCNC: 4.6 MMOL/L (ref 3.7–5.3)
RBC # BLD: 4.69 M/UL (ref 4.5–5.9)
RBC # BLD: ABNORMAL 10*6/UL
SEG NEUTROPHILS: 59 % (ref 36–66)
SEGMENTED NEUTROPHILS ABSOLUTE COUNT: 4.6 K/UL (ref 1.3–9.1)
SODIUM BLD-SCNC: 139 MMOL/L (ref 135–144)
WBC # BLD: 7.6 K/UL (ref 3.5–11)
WBC # BLD: ABNORMAL 10*3/UL

## 2019-04-17 PROCEDURE — 93005 ELECTROCARDIOGRAM TRACING: CPT

## 2019-04-17 PROCEDURE — 36415 COLL VENOUS BLD VENIPUNCTURE: CPT

## 2019-04-17 PROCEDURE — 85025 COMPLETE CBC W/AUTO DIFF WBC: CPT

## 2019-04-17 PROCEDURE — 80048 BASIC METABOLIC PNL TOTAL CA: CPT

## 2019-04-17 SDOH — HEALTH STABILITY: MENTAL HEALTH: HOW OFTEN DO YOU HAVE A DRINK CONTAINING ALCOHOL?: NEVER

## 2019-04-18 ENCOUNTER — ANESTHESIA EVENT (OUTPATIENT)
Dept: OPERATING ROOM | Age: 64
End: 2019-04-18
Payer: COMMERCIAL

## 2019-04-18 RX ORDER — SODIUM CHLORIDE, SODIUM LACTATE, POTASSIUM CHLORIDE, CALCIUM CHLORIDE 600; 310; 30; 20 MG/100ML; MG/100ML; MG/100ML; MG/100ML
INJECTION, SOLUTION INTRAVENOUS CONTINUOUS
Status: CANCELLED | OUTPATIENT
Start: 2019-04-18

## 2019-04-18 RX ORDER — SODIUM CHLORIDE 0.9 % (FLUSH) 0.9 %
10 SYRINGE (ML) INJECTION EVERY 12 HOURS SCHEDULED
Status: CANCELLED | OUTPATIENT
Start: 2019-04-18

## 2019-04-18 RX ORDER — SODIUM CHLORIDE 0.9 % (FLUSH) 0.9 %
10 SYRINGE (ML) INJECTION PRN
Status: CANCELLED | OUTPATIENT
Start: 2019-04-18

## 2019-04-18 RX ORDER — LIDOCAINE HYDROCHLORIDE 10 MG/ML
1 INJECTION, SOLUTION EPIDURAL; INFILTRATION; INTRACAUDAL; PERINEURAL
Status: CANCELLED | OUTPATIENT
Start: 2019-04-18 | End: 2019-04-18

## 2019-04-26 ENCOUNTER — TELEPHONE (OUTPATIENT)
Dept: GASTROENTEROLOGY | Age: 64
End: 2019-04-26

## 2019-04-26 NOTE — TELEPHONE ENCOUNTER
Spoke with patient and confirmed his 04/29/19 procedure at [facility]. Patient does have a  and had no questions regarding his prep. He did stop his aspirin.

## 2019-04-29 ENCOUNTER — ANESTHESIA (OUTPATIENT)
Dept: OPERATING ROOM | Age: 64
End: 2019-04-29
Payer: COMMERCIAL

## 2019-04-29 ENCOUNTER — HOSPITAL ENCOUNTER (OUTPATIENT)
Age: 64
Setting detail: OUTPATIENT SURGERY
Discharge: HOME | End: 2019-04-29
Attending: INTERNAL MEDICINE | Admitting: INTERNAL MEDICINE
Payer: COMMERCIAL

## 2019-04-29 VITALS
SYSTOLIC BLOOD PRESSURE: 140 MMHG | WEIGHT: 264 LBS | TEMPERATURE: 97.5 F | HEART RATE: 65 BPM | RESPIRATION RATE: 20 BRPM | OXYGEN SATURATION: 90 % | HEIGHT: 70 IN | BODY MASS INDEX: 37.8 KG/M2 | DIASTOLIC BLOOD PRESSURE: 85 MMHG

## 2019-04-29 VITALS — DIASTOLIC BLOOD PRESSURE: 86 MMHG | SYSTOLIC BLOOD PRESSURE: 159 MMHG | OXYGEN SATURATION: 97 %

## 2019-04-29 PROCEDURE — 2709999900 HC NON-CHARGEABLE SUPPLY: Performed by: INTERNAL MEDICINE

## 2019-04-29 PROCEDURE — 3700000000 HC ANESTHESIA ATTENDED CARE: Performed by: INTERNAL MEDICINE

## 2019-04-29 PROCEDURE — 2580000003 HC RX 258: Performed by: ANESTHESIOLOGY

## 2019-04-29 PROCEDURE — 88305 TISSUE EXAM BY PATHOLOGIST: CPT

## 2019-04-29 PROCEDURE — 7100000001 HC PACU RECOVERY - ADDTL 15 MIN: Performed by: INTERNAL MEDICINE

## 2019-04-29 PROCEDURE — 3609012400 HC EGD TRANSORAL BIOPSY SINGLE/MULTIPLE: Performed by: INTERNAL MEDICINE

## 2019-04-29 PROCEDURE — 7100000000 HC PACU RECOVERY - FIRST 15 MIN: Performed by: INTERNAL MEDICINE

## 2019-04-29 PROCEDURE — 2500000003 HC RX 250 WO HCPCS: Performed by: NURSE ANESTHETIST, CERTIFIED REGISTERED

## 2019-04-29 PROCEDURE — 6360000002 HC RX W HCPCS: Performed by: NURSE ANESTHETIST, CERTIFIED REGISTERED

## 2019-04-29 PROCEDURE — 3700000001 HC ADD 15 MINUTES (ANESTHESIA): Performed by: INTERNAL MEDICINE

## 2019-04-29 PROCEDURE — 3609010600 HC COLONOSCOPY POLYPECTOMY SNARE/COLD BIOPSY: Performed by: INTERNAL MEDICINE

## 2019-04-29 RX ORDER — PROPOFOL 10 MG/ML
INJECTION, EMULSION INTRAVENOUS PRN
Status: DISCONTINUED | OUTPATIENT
Start: 2019-04-29 | End: 2019-04-29 | Stop reason: SDUPTHER

## 2019-04-29 RX ORDER — LIDOCAINE HYDROCHLORIDE 10 MG/ML
1 INJECTION, SOLUTION EPIDURAL; INFILTRATION; INTRACAUDAL; PERINEURAL
Status: DISCONTINUED | OUTPATIENT
Start: 2019-04-29 | End: 2019-04-29 | Stop reason: HOSPADM

## 2019-04-29 RX ORDER — SODIUM CHLORIDE 0.9 % (FLUSH) 0.9 %
10 SYRINGE (ML) INJECTION PRN
Status: DISCONTINUED | OUTPATIENT
Start: 2019-04-29 | End: 2019-04-29 | Stop reason: HOSPADM

## 2019-04-29 RX ORDER — SODIUM CHLORIDE 0.9 % (FLUSH) 0.9 %
10 SYRINGE (ML) INJECTION EVERY 12 HOURS SCHEDULED
Status: DISCONTINUED | OUTPATIENT
Start: 2019-04-29 | End: 2019-04-29 | Stop reason: HOSPADM

## 2019-04-29 RX ORDER — PROPOFOL 10 MG/ML
INJECTION, EMULSION INTRAVENOUS CONTINUOUS PRN
Status: DISCONTINUED | OUTPATIENT
Start: 2019-04-29 | End: 2019-04-29 | Stop reason: SDUPTHER

## 2019-04-29 RX ORDER — SODIUM CHLORIDE, SODIUM LACTATE, POTASSIUM CHLORIDE, CALCIUM CHLORIDE 600; 310; 30; 20 MG/100ML; MG/100ML; MG/100ML; MG/100ML
INJECTION, SOLUTION INTRAVENOUS CONTINUOUS
Status: DISCONTINUED | OUTPATIENT
Start: 2019-04-29 | End: 2019-04-29 | Stop reason: HOSPADM

## 2019-04-29 RX ORDER — LIDOCAINE HYDROCHLORIDE 10 MG/ML
INJECTION, SOLUTION EPIDURAL; INFILTRATION; INTRACAUDAL; PERINEURAL PRN
Status: DISCONTINUED | OUTPATIENT
Start: 2019-04-29 | End: 2019-04-29 | Stop reason: SDUPTHER

## 2019-04-29 RX ADMIN — SODIUM CHLORIDE, POTASSIUM CHLORIDE, SODIUM LACTATE AND CALCIUM CHLORIDE: 600; 310; 30; 20 INJECTION, SOLUTION INTRAVENOUS at 06:53

## 2019-04-29 RX ADMIN — LIDOCAINE HYDROCHLORIDE 50 MG: 10 INJECTION, SOLUTION EPIDURAL; INFILTRATION; INTRACAUDAL; PERINEURAL at 07:36

## 2019-04-29 RX ADMIN — PROPOFOL 125 MCG/KG/MIN: 10 INJECTION, EMULSION INTRAVENOUS at 07:35

## 2019-04-29 RX ADMIN — PROPOFOL 100 MG: 10 INJECTION, EMULSION INTRAVENOUS at 07:36

## 2019-04-29 RX ADMIN — PROPOFOL 20 MG: 10 INJECTION, EMULSION INTRAVENOUS at 07:38

## 2019-04-29 ASSESSMENT — PULMONARY FUNCTION TESTS
PIF_VALUE: 0
PIF_VALUE: 1
PIF_VALUE: 0
PIF_VALUE: 1

## 2019-04-29 ASSESSMENT — PAIN - FUNCTIONAL ASSESSMENT: PAIN_FUNCTIONAL_ASSESSMENT: 0-10

## 2019-04-29 ASSESSMENT — COPD QUESTIONNAIRES: CAT_SEVERITY: NO INTERVAL CHANGE

## 2019-04-29 ASSESSMENT — PAIN SCALES - GENERAL: PAINLEVEL_OUTOF10: 0

## 2019-04-29 NOTE — OP NOTE
COLONOSCOPY    DATE OF PROCEDURE: 4/29/2019    SURGEON: Marjorie Goss MD    ASSISTANT: None    PREOPERATIVE DIAGNOSIS: History of colon polyps removed 7 years ago. This procedure is performed to evaluate polyp recurrence, surveillance. POSTOPERATIVE DIAGNOSIS: Small polyps as described. No recurrent polyp seen at the previous polypectomy site. Has hemorrhoids. OPERATION: Total colonoscopy and excision of polyp with biopsy forceps from nearby ileocecal valve, snare polypectomy from sigmoid colon. ANESTHESIA: MAC    ESTIMATED BLOOD LOSS: None    COMPLICATIONS: None     SPECIMENS:  Was Obtained: Small polyp nearby ileocecal valve and polyp from sigmoid colon. HISTORY: The patient is a 70y.o. year old male with history of above preop diagnosis. I recommended colonoscopy with possible biopsy or polypectomy and I explained the risk, benefits, expected outcome, and alternatives to the procedure. Risks included but are not limited to bleeding, infection, respiratory distress, hypotension, and perforation of the colon and possibility of missing a lesion. The patient understands and is in agreement. PROCEDURE:  The patient's SPO2 remained above 90% throughout the procedure. Digital rectal exam was normal.  The colonoscope was inserted through the anus into the rectum and advanced under direct vision to the cecum without difficulty. Terminal ileum was examined for approximately 2 inches. The prep was good. Findings:  Terminal ileum: normal    Cecum/Ascending colon: abnormal: , Also examined in the retroflexed view. Nearby ileocecal valve, a polyp which is about 2-3 mm, flat seen. This is completely excised using cold biopsy forceps. Transverse colon: normal    Descending/Sigmoid colon: abnormal: In sigmoid colon at about 60 cm from the anus, there is a polyp which is about 5 mm seen. This is flat. This is removed using cold snare.     Rectum/Anus: examined in normal and retroflexed positions and was normal, has hemorrhoids. Withdrawal Time was (minutes): 12          The colon was decompressed and the scope was removed. The patient tolerated the procedure and conscious sedation without unusual events. During the procedure, the patient's blood pressure, pulse and oxygen saturation remained stable and documented. No unusual events occurred during the procedure. Patient was transferred to recovery room and will be discharged when criteria is met. Recommendations/Plan:   1. F/U Biopsies  2. F/U In Office as instructed  3. Discussed with the family  4. High fiber diet   5. Precautions to avoid constipation     Next colonoscopy: 3 years.   If Colonoscopy is less than 10 years the recommended reason is due:polyps    Electronically signed by Charis Jade MD  on 4/29/2019 at 8:08 AM

## 2019-04-29 NOTE — OP NOTE
ESOPHAGOGASTRODUODENOSCOPY   ( EGD )  DATE OF PROCEDURE: 4/29/2019     SURGEON: Fidelia Abdi MD    ASSISTANT: None    PREOPERATIVE DIAGNOSIS: History of Gilliam's, GERD. Procedure performed to evaluate esophageal pathology. POSTOPERATIVE DIAGNOSIS: No Gilliam's mucosa seen. Small sliding hiatal hernia. Probable healing esophagitis. OPERATION: Upper GI endoscopy with Biopsy    ANESTHESIA: MAC    ESTIMATED BLOOD LOSS: None    COMPLICATIONS: None. SPECIMENS:  Was Obtained: Random biopsies from the body of the esophagus to rule out microscopic esophagitis. HISTORY: The patient is a 70y.o. year old male with history of above preop diagnosis. I recommended esophagogastroduodenoscopy with possible biopsy and I explained the risk, benefits, expected outcome, and alternatives to the procedure. Risks included but are not limited to bleeding, infection, respiratory distress, hypotension, and perforation of the esophagus, stomach, or duodenum. Patient understands and is in agreement. PROCEDURE: The patient was given IV conscious sedation. The patient's SPO2 remained above 90% throughout the procedure. Cetacaine spray given. Patient placed in left lateral position. Olympus  videogastroscope was inserted orally under vision into the esophagus without difficulty and advanced into the stomach then through the pylorus up to the second part of duodenum. Findings:    Retropharyngeal area was grossly normal appearing    Esophagus: normal.  However it appears that he may have healing esophagitis. Multiple biopsies taken from the body of the esophagus. Squamocolumnar junction is at about 40 cm. May have small sliding hernia. No clear-cut Gilliam's mucosa seen.     Stomach:    Fundus and Cardia Examined in Retroflexed View: normal    Body: normal    Antrum: normal    Duodenum:     Descending: normal    Bulb: normal    While withdrawing the scope the above findings were verified and the

## 2019-04-29 NOTE — H&P
HISTORY and [address]       NAME:  Antolin Reyes  MRN: [id number]   YOB: 1955   Date: 4/29/2019   Age: 70 y.o. Gender: male     H&P Update Note    H&P from 04/17/2019 reviewed and updated. Patient examined. INTERVAL HISTORY:     Patient is feeling well today, denies any fever/chills, chest pain, shortness of breath. No interval changes. No interval changes to past medical history, social history, family history. Review of systems as stated above and otherwise negative. PHYSICAL EXAM:     Vitals: /76   Pulse 67   Temp 97.5 °F (36.4 °C) (Oral)   Resp 16   Ht 5' 10\" (1.778 m)   Wt 264 lb (119.7 kg)   SpO2 96%   BMI 37.88 kg/m²  Body mass index is 37.88 kg/m². Patient is alert and oriented, in no distress. Normotensive. Heart rate and rhythm are regular. Lungs clear to auscultation bilaterally. Abdomen is soft, non tender. No pedal edema. No interval changes. I concur with the findings. VIKY ROBLES CNP on 4/29/2019 at 6:58 AM  VIKY Garrison CNP   Nurse Practitioner   Gastroenterology   H&P   Signed   Date of Service:  4/17/2019  3:19 PM          Related encounter: Pre-Admission Testing Visit from 4/17/2019 in [address] [place] [address]                 Show:Clear all  [x]Manual[x]Template[]Copied    Added by:  [x]VIKY Melissa CNP      []Enio for details            [facility]         NAME:  Antolin Reyes  MRN: [id number]   YOB: 1955   Date: 4/17/2019   Age: 70 y.o. Gender: male         COMPLAINT AND PRESENT HISTORY:   Imelda Pardo is a 70 yr old male who is here for Pre Admission Testing, He is to have an EGD and colonoscopy. He had had an tubular adenoma of the colon, Has no change in bowel habits, No blood in stool.  No change in diameter of stool   In 2000 had some duodenal bleeding from H Pylori   He has GERD but now controlled He had some Barretts in the esophagus but that healed, He has no difficulty swallowing, No problems with elimination.   Has no [facility] colon cancer.     PAST MEDICAL HISTORY           Past Medical History:   Diagnosis Date    Allergic rhinitis      Anxiety      Arthritis      Gilliam esophagus      Bleeding ulcer      COPD (chronic obstructive pulmonary disease) (HCC)      Environmental allergies      GERD (gastroesophageal reflux disease)      Hyperlipidemia      Hyperplastic colon polyp      Hypertension      Lung nodule       has follow up CT scans    Perforated stomach ([address] 1985    Pure hypercholesterolemia      Tubular adenoma of colon 2012      Dr. Anca Abdalla colonoscopy            SURGICAL HISTORY              Past Surgical History:   Procedure Laterality Date    APPENDECTOMY        COLONOSCOPY        COLONOSCOPY   2012    HERNIA REPAIR        NASAL SINUS SURGERY   10/05/2018    STOMACH SURGERY         Perforated Stomach repair    TONSILLECTOMY        UMBILICAL HERNIA REPAIR   2011    UPPER GASTROINTESTINAL ENDOSCOPY                FAMILY HISTORY            Family History   Problem Relation Age of Onset    Cancer Mother           thyroid    Thyroid Disease Sister      Heart Attack Paternal Grandfather              SOCIAL HISTORY        Social History            Socioeconomic History    Marital status:        Spouse name: None    Number of children: None    Years of education: None    Highest education level: None   Occupational History    None   Social Needs    Financial resource strain: None    Food insecurity:       Worry: None       Inability: None    Transportation needs:       Medical: None       Non-medical: None   Tobacco Use    Smoking status: Former Smoker       Packs/day: 1.00       Years: 30.00       Pack years: 30.00       Start date: 5       Last attempt to quit: 2014       Years since quittin.3    Smokeless tobacco: Never Used   Substance and Sexual Activity    Alcohol use: Never       Frequency: Never    Drug use: No    Sexual activity: None   Lifestyle    Physical activity:       Days per week: None       Minutes per session: None    Stress: None   Relationships    Social connections:       Talks on phone: None       Gets together: None       Attends Orthodoxy service: None       Active member of club or organization: None       Attends meetings of clubs or organizations: None       Relationship status: None    Intimate partner violence:       Fear of current or ex partner: None       Emotionally abused: None       Physically abused: None       Forced sexual activity: None   Other Topics Concern    None   Social History Narrative    None            REVIEW OF SYSTEMS             Allergies   Allergen Reactions    Prochlorperazine Edisylate         seizure    Reglan [Metoclopramide Hcl]         seizure    Seasonal                  Current Outpatient Medications on File Prior to Encounter   Medication Sig Dispense Refill    lansoprazole (PREVACID) 30 MG delayed release capsule Take 1 capsule by mouth daily 90 capsule 3    fluticasone (FLONASE) 50 MCG/ACT nasal spray 2 sprays by Nasal route daily 3 Bottle 11    SPIRIVA RESPIMAT 2.5 MCG/ACT AERS inhaler USE 2 INHALATIONS DAILY 3 Inhaler 0    Calcium Carb-Cholecalciferol (CALCIUM + D3) 600-200 MG-UNIT TABS tablet Take 2 tablets by mouth daily 60 tablet      metroNIDAZOLE (METROGEL) 0.75 % gel apply to the affected area once daily for 30 days   2    simvastatin (ZOCOR) 20 MG tablet TAKE 1 TABLET NIGHTLY 90 tablet 3    amLODIPine (NORVASC) 10 MG tablet TAKE 1 TABLET DAILY (Patient taking differently: TAKE 1 TABLET DAILY, nightly) 90 tablet 3    acyclovir (ZOVIRAX) 800 MG tablet Si tablet in the am and 1 in the pm for each cold sore, may repeat as needed. (total of 2 tablets for each outbreak) 20 tablet 0    aspirin 81 MG tablet Take 81 mg by mouth daily.          bisacodyl (DULCOLAX) 5 extremities, No pedal edema. Grasp strength is 5/5  Strong peripheral pulses      NEUROLOGIC:  Alert and color good, Speech clear The patient is conscious, alert, oriented, No apparent focal sensory or motor deficits.                       PROVISIONAL DIAGNOSES / SURGERY:       For EGD and colonoscopy       Patient Active Problem List     Diagnosis Date Noted    Chronic pain of right knee 02/13/2019    Osteopenia of right lower leg 01/29/2019    Mass of right thigh 01/22/2019    Seasonal allergic rhinitis due to pollen 08/14/2018    Cold sore 08/14/2018    Ventral hernia without obstruction or gangrene 08/14/2018    Pulmonary nodules 08/14/2018    Acute pain of right shoulder 02/09/2018    Basal cell carcinoma of left upper arm 02/09/2018    Essential hypertension 08/09/2017    Pure hypercholesterolemia 08/09/2017    Chronic obstructive pulmonary disease ([facility] 08/09/2017    Bilateral hearing loss 08/09/2017    Tinnitus aurium 08/09/2017    History of smoking 06/12/2015    Tubular adenoma of colon 06/24/2013    Allergic rhinitis      Anxiety      Hyperplastic colon polyp      Gilliam's esophagus without dysplasia      GERD (gastroesophageal reflux disease)      Bleeding ulcer      Environmental allergies              VIKY Meade - CNP on 4/17/2019 at 3:21 PM                Cosigned by: Refugio Morales MD at 4/25/2019 12:14 PM   Revision History                                    Routing History

## 2019-04-29 NOTE — ANESTHESIA POSTPROCEDURE EVALUATION
POST- ANESTHESIA EVALUATION       Pt Name: Haresh Graves  MRN: [id number]  YOB: 1955  Date of evaluation: 4/29/2019  Time:  1:45 PM      BP (!) 140/85   Pulse 65   Temp 97.5 °F (36.4 °C) (Infrared)   Resp 20   Ht 5' 10\" (1.778 m)   Wt 264 lb (119.7 kg)   SpO2 90%   BMI 37.88 kg/m²      Consciousness Level  Awake  Cardiopulmonary Status  Stable  Pain Adequately Treated YES  Nausea / Vomiting  NO  Adequate Hydration  YES  Anesthesia Related Complications NONE      Electronically signed by Silas Martin MD on 4/29/2019 at 1:45 PM       Department of Anesthesiology  Postprocedure Note    Patient: Haresh Graves  MRN: [id number]  YOB: 1955  Date of evaluation: 4/29/2019  Time:  1:44 PM     Procedure Summary     Date:  04/29/19 Room / Location:  [address] [place] [address] 01 / [address]    Anesthesia Start:  2248 Anesthesia Stop:  2392    Procedures:       EGD BIOPSY (N/A Esophagus)      COLONOSCOPY POLYPECTOMY SNARE/COLD BIOPSY (N/A ) Diagnosis:  (GERD TUBULAR ADENOMA OF COLON)    Surgeon:  Luana Milian MD Responsible Provider:  Silas Martin MD    Anesthesia Type:  MAC ASA Status:  2          Anesthesia Type: MAC    Ann Marie Phase I: Ann Marie Score: 10    Ann Marie Phase II:      Last vitals: Reviewed and per EMR flowsheets.        Anesthesia Post Evaluation

## 2019-04-29 NOTE — ANESTHESIA PRE PROCEDURE
Department of Anesthesiology  Preprocedure Note       Name:  Jena Gould   Age:  70 y.o.  :  1955                                          MRN:  [id number]         Date:  2019      Surgeon: Kvng Gregg):  Angus Garcia MD    Procedure: EGD ESOPHAGOGASTRODUODENOSCOPY (N/A Esophagus)  COLONOSCOPY DIAGNOSTIC (N/A )    Medications prior to admission:   Prior to Admission medications    Medication Sig Start Date End Date Taking? Authorizing Provider   bisacodyl (DULCOLAX) 5 MG EC tablet TAKE 4 TABS AT 10 AM DAY PRIOR TO COLONOSCOPY 4/3/19  Yes Angus Garcia MD   polyethylene glycol (GLYCOLAX) powder Use as directed by following your patient instructions given by office. 4/3/19 5/3/19 Yes Angus Garcia MD   lansoprazole (PREVACID) 30 MG delayed release capsule Take 1 capsule by mouth daily 3/11/19  Yes VIKY Cardoso CNP   fluticasone [facility]) 50 MCG/ACT nasal spray 2 sprays by Nasal route daily 3/7/19  Yes VIKY Cardoso CNP   SPIRIVA RESPIMAT 2.5 MCG/ACT AERS inhaler USE 2 INHALATIONS DAILY 3/5/19  Yes Madhavi Fuentes MD   Calcium Carb-Cholecalciferol (CALCIUM + D3) 600-200 MG-UNIT TABS tablet Take 2 tablets by mouth daily 19  Yes VIKY Cardoso CNP   simvastatin (ZOCOR) 20 MG tablet TAKE 1 TABLET NIGHTLY 18  Yes VIKY Cardoso CNP   amLODIPine (NORVASC) 10 MG tablet TAKE 1 TABLET DAILY  Patient taking differently: TAKE 1 TABLET DAILY, nightly 18  Yes VIKY Cardoso CNP   metroNIDAZOLE (METROGEL) 0.75 % gel apply to the affected area once daily for 30 days 18   Historical Provider, MD   ALPJOSEPHZobrenda Negrete) 0.25 MG tablet Take 1 tablet by mouth every 12 hours as needed for Anxiety for up to 30 days. . 18  VIKY Cardoso CNP   acyclovir (ZOVIRAX) 800 MG tablet Si tablet in the am and 1 in the pm for each cold sore, may repeat as needed. (total of 2 tablets for each outbreak) 18   VIKY Cardoso - FRANSISCO   aspirin 81 MG tablet Take 81 mg by mouth daily. Historical Provider, MD       Current medications:    Current Facility-Administered Medications   Medication Dose Route Frequency Provider Last Rate Last Dose    lactated ringers infusion   Intravenous Continuous Ericka Nunez  mL/hr at 04/29/19 0653      lidocaine PF 1 % injection 1 mL  1 mL Intradermal Once PRN Ericka Nunez MD        sodium chloride flush 0.9 % injection 10 mL  10 mL Intravenous 2 times per day Ericka Nunez MD        sodium chloride flush 0.9 % injection 10 mL  10 mL Intravenous PRN Ericka Nunez MD           Allergies:     Allergies   Allergen Reactions    Prochlorperazine Edisylate      seizure    Reglan [Metoclopramide Hcl]      seizure    Seasonal        Problem List:    Patient Active Problem List   Diagnosis Code    Allergic rhinitis J30.9    Anxiety F41.9    Hyperplastic colon polyp K63.5    Gilliam's esophagus without dysplasia K22.70    GERD (gastroesophageal reflux disease) K21.9    Bleeding ulcer K27.4    Environmental allergies     Tubular adenoma of colon D12.6    History of smoking Z87.891    Essential hypertension I10    Pure hypercholesterolemia E78.00    Chronic obstructive pulmonary disease (HCC) J44.9    Bilateral hearing loss H91.93    Tinnitus aurium H93.19    Acute pain of right shoulder M25.511    Basal cell carcinoma of left upper arm C44.619    Seasonal allergic rhinitis due to pollen J30.1    Cold sore B00.1    Ventral hernia without obstruction or gangrene K43.9    Pulmonary nodules R91.8    Mass of right thigh R22.41    Osteopenia of right lower leg M85.861    Chronic pain of right knee M25.561, G89.29       Past Medical History:        Diagnosis Date    Allergic rhinitis     Anxiety     Arthritis     Gilliam esophagus     Bleeding ulcer     COPD (chronic obstructive pulmonary disease) (HCC)     Environmental allergies     GERD (gastroesophageal reflux disease)     Hyperlipidemia     Hyperplastic colon polyp     Hypertension     Lung nodule     has follow up CT scans    Perforated stomach ([address] 1985    Pure hypercholesterolemia     Tubular adenoma of colon 2012     Dr. Modesta Villeda colonoscopy       Past Surgical History:        Procedure Laterality Date    APPENDECTOMY      COLONOSCOPY      COLONOSCOPY  2012    HERNIA REPAIR      NASAL SINUS SURGERY  10/05/2018    STOMACH SURGERY      Perforated Stomach repair    TONSILLECTOMY      UMBILICAL HERNIA REPAIR  2011    UPPER GASTROINTESTINAL ENDOSCOPY         Social History:    Social History     Tobacco Use    Smoking status: Former Smoker     Packs/day: 1.00     Years: 30.00     Pack years: 30.00     Start date:      Last attempt to quit: 2014     Years since quittin.3    Smokeless tobacco: Never Used   Substance Use Topics    Alcohol use: Never     Frequency: Never                                Counseling given: Not Answered      Vital Signs (Current):   Vitals:    19 0634   BP: 134/76   Pulse: 67   Resp: 16   Temp: 97.5 °F (36.4 °C)   TempSrc: Oral   SpO2: 96%   Weight: 264 lb (119.7 kg)   Height: 5' 10\" (1.778 m)                                              BP Readings from Last 3 Encounters:   19 134/76   19 (!) 158/81   19 (!) 156/78       NPO Status: Time of last liquid consumption:                         Time of last solid consumption:                         Date of last liquid consumption: 19                        Date of last solid food consumption: 19    BMI:   Wt Readings from Last 3 Encounters:   19 264 lb (119.7 kg)   19 264 lb (119.7 kg)   19 266 lb (120.7 kg)     Body mass index is 37.88 kg/m².     CBC:   Lab Results   Component Value Date    WBC 7.6 2019    RBC 4.69 2019    RBC 4.83 2011    HGB 14.9 2019    HCT 44.0 2019    MCV 93.9 2019    RDW 12.5 2019     2019     12/05/2011       CMP:   Lab Results   Component Value Date     04/17/2019    K 4.6 04/17/2019     04/17/2019    CO2 25 04/17/2019    BUN 17 04/17/2019    CREATININE 0.91 04/17/2019    GFRAA >60 04/17/2019    LABGLOM >60 04/17/2019    GLUCOSE 103 04/17/2019    GLUCOSE 93 01/23/2012    PROT 7.6 01/12/2019    CALCIUM 9.1 04/17/2019    BILITOT 0.94 01/12/2019    ALKPHOS 80 01/12/2019    AST 23 01/12/2019    ALT 25 01/12/2019       POC Tests: No results for input(s): POCGLU, POCNA, POCK, POCCL, POCBUN, POCHEMO, POCHCT in the last 72 hours. Coags: No results found for: PROTIME, INR, APTT    HCG (If Applicable): No results found for: PREGTESTUR, PREGSERUM, HCG, HCGQUANT     ABGs: No results found for: PHART, PO2ART, JEZ6BNN, GZT8NOD, BEART, K2RXUFEP     Type & Screen (If Applicable):  No results found for: [facility]    Anesthesia Evaluation  Patient summary reviewed and Nursing notes reviewed  Airway: Mallampati: III  TM distance: >3 FB   Neck ROM: full  Mouth opening: > = 3 FB Dental:          Pulmonary:normal exam    (+) COPD: no interval change,  sleep apnea:                             Cardiovascular:    (+) hypertension: no interval change,         Rhythm: regular  Rate: normal                    Neuro/Psych:               GI/Hepatic/Renal:   (+) GERD: no interval change, PUD,      (-) hiatal hernia       Endo/Other:                     Abdominal:           Vascular:                                        Anesthesia Plan      MAC     ASA 2       Induction: intravenous. Anesthetic plan and risks discussed with patient. Plan discussed with CRNA.                   Ayesha Lock MD   4/29/2019

## 2019-04-30 ENCOUNTER — OFFICE VISIT (OUTPATIENT)
Dept: PULMONOLOGY | Age: 64
End: 2019-04-30
Payer: COMMERCIAL

## 2019-04-30 VITALS
OXYGEN SATURATION: 98 % | DIASTOLIC BLOOD PRESSURE: 82 MMHG | SYSTOLIC BLOOD PRESSURE: 147 MMHG | RESPIRATION RATE: 16 BRPM | WEIGHT: 268 LBS | HEIGHT: 70 IN | BODY MASS INDEX: 38.37 KG/M2 | HEART RATE: 71 BPM

## 2019-04-30 DIAGNOSIS — Z87.891 PERSONAL HISTORY OF TOBACCO USE: ICD-10-CM

## 2019-04-30 DIAGNOSIS — J44.9 CHRONIC OBSTRUCTIVE PULMONARY DISEASE, UNSPECIFIED COPD TYPE (HCC): Primary | ICD-10-CM

## 2019-04-30 LAB — SURGICAL PATHOLOGY REPORT: NORMAL

## 2019-04-30 PROCEDURE — 99214 OFFICE O/P EST MOD 30 MIN: CPT | Performed by: INTERNAL MEDICINE

## 2019-04-30 PROCEDURE — G0296 VISIT TO DETERM LDCT ELIG: HCPCS | Performed by: INTERNAL MEDICINE

## 2019-04-30 NOTE — PROGRESS NOTES
and persistent chronic cough and since he started using Spiriva despite and he use to have which was brown in color is more lighter and yellowish. Positive post nasal drip and nasal obstruction on R side and on Flonase nasal spray   He never was diagnosed with obstructive sleep apnea but he was told by his wife that he does have snoring he occasionally wake himself with snoring. He denies having any witnessed apnea sometimes wake up with choking sensation. He does have dry mouth when he wake up, but denies any fatigue and tiredness during the daytime and he denies any headache in the morning. He denies unrefreshed sleep he does have history of weight gain. He denies forgetfulness or decreased concentration.         ESS is     Sleep Medicine 10/30/2018 1/3/2018   Sitting and reading 0 0   Watching TV 0 0   Sitting, inactive in a public place (e.g. a theatre or a meeting) 0 0   As a passenger in a car for an hour without a break 0 0   Lying down to rest in the afternoon when circumstances permit 0 0   Sitting and talking to someone 0 0   Sitting quietly after a lunch without alcohol 0 0   In a car, while stopped for a few minutes in traffic 0 0   Total score 0 0       Past Medical History:        Diagnosis Date    Allergic rhinitis     Anxiety     Arthritis     Gilliam esophagus     Bleeding ulcer     [facility] [address]     Environmental allergies     GERD (gastroesophageal reflux disease)     Hyperlipidemia     Hyperplastic colon polyp     Hypertension     Lung nodule     has follow up CT scans    Perforated stomach ([facility] [address] 1985    Pure hypercholesterolemia     Tubular adenoma of colon 1/2012     Dr. Peña [place] colonoscopy       Past Surgical History:        Procedure Laterality Date    APPENDECTOMY      COLONOSCOPY      COLONOSCOPY  01/06/2012    COLONOSCOPY N/A 4/29/2019    COLONOSCOPY POLYPECTOMY SNARE/COLD BIOPSY performed by Sal Ramsey MD at [address]  HERNIA REPAIR      NASAL SINUS SURGERY  10/05/2018    STOMACH SURGERY      Perforated Stomach repair    TONSILLECTOMY      UMBILICAL HERNIA REPAIR  2011    UPPER GASTROINTESTINAL ENDOSCOPY      UPPER GASTROINTESTINAL ENDOSCOPY N/A 2019    EGD BIOPSY performed by Bonny Alcaraz MD at [address] [place] [address]: Allergies   Allergen Reactions    Prochlorperazine Edisylate      seizure    Reglan [Metoclopramide Hcl]      seizure    Seasonal          Home Meds:   Outpatient Encounter Medications as of 2019   Medication Sig Dispense Refill    bisacodyl (DULCOLAX) 5 MG EC tablet TAKE 4 TABS AT 10 AM DAY PRIOR TO COLONOSCOPY 4 tablet 0    polyethylene glycol (GLYCOLAX) powder Use as directed by following your patient instructions given by office. 255 g 0    lansoprazole (PREVACID) 30 MG delayed release capsule Take 1 capsule by mouth daily 90 capsule 3    fluticasone (FLONASE) 50 MCG/ACT nasal spray 2 sprays by Nasal route daily 3 Bottle 11    SPIRIVA RESPIMAT 2.5 MCG/ACT AERS inhaler USE 2 INHALATIONS DAILY 3 Inhaler 0    Calcium Carb-Cholecalciferol (CALCIUM + D3) 600-200 MG-UNIT TABS tablet Take 2 tablets by mouth daily 60 tablet     metroNIDAZOLE (METROGEL) 0.75 % gel apply to the affected area once daily for 30 days  2    simvastatin (ZOCOR) 20 MG tablet TAKE 1 TABLET NIGHTLY 90 tablet 3    amLODIPine (NORVASC) 10 MG tablet TAKE 1 TABLET DAILY (Patient taking differently: TAKE 1 TABLET DAILY, nightly) 90 tablet 3    acyclovir (ZOVIRAX) 800 MG tablet Si tablet in the am and 1 in the pm for each cold sore, may repeat as needed. (total of 2 tablets for each outbreak) 20 tablet 0    aspirin 81 MG tablet Take 81 mg by mouth daily.  ALPRAZolam (XANAX) 0.25 MG tablet Take 1 tablet by mouth every 12 hours as needed for Anxiety for up to 30 days. . 60 tablet 0     Facility-Administered Encounter Medications as of 2019   Medication Dose Route Frequency Provider Last Rate Last Dose    triamcinolone acetonide (KENALOG-40) injection 40 mg  40 mg Intramuscular Once Bobby Duty, APRN - CNP           Social History:   TOBACCO:   reports that he quit smoking about 3 years ago. He has a 40.00 pack-year smoking history. 1 PPD, He has never used smokeless tobacco.  ETOH:   reports that he does not drink alcohol.   OCCUPATION:  Refinery worker operational coordinator, office work    Family History:       Problem Relation Age of Onset    Cancer Mother         thyroid    Thyroid Disease Sister     Heart Attack Paternal Grandfather        Immunizations:    Immunization History   Administered Date(s) Administered    Influenza Vaccine, unspecified formulation 11/07/2016, 10/30/2018    Influenza Virus Vaccine 10/30/2013, 10/15/2014, 10/16/2015, 10/25/2017    Influenza, High Dose (Fluzone 65 yrs and older) 11/05/2018    Pneumococcal 13-valent Conjugate ([id number]) 08/14/2018    Pneumococcal Polysaccharide ([id number]) 12/08/2015    Tdap (Boostrix, Adacel) 06/06/2012         REVIEW OF SYSTEMS:  CONSTITUTIONAL:  negative for  fevers, chills, sweats, fatigue, malaise, anorexia and weight loss  EYES:  negative for  double vision, blurred vision, dry eyes, eye discharge, visual disturbance, irritation, redness and icterus  HEENT:  Negative for postnasal dripping and nasal obstruction and negative for  hearing loss, ear drainage, nasal congestion, epistaxis, sore mouth, sore throat and hoarseness  RESPIRATORY:  Positive for intermittent cough, negative for  dyspnea, wheezing, hemoptysis, chest pain, pleuritic pain and cyanosis  CARDIOVASCULAR:  negative for  chest pain, palpitations, orthopnea, PND, exertional chest pressure/discomfort, fatigue, early saiety, edema, syncope  GASTROINTESTINAL:  Negative for reflux, nausea, vomiting, change in bowel habits, diarrhea, constipation, abdominal pain, abdominal mass, abdominal distention, dysphagia, regurgitation, odynophagia, hematemesis and hemtochezia  GENITOURINARY:  negative for frequency, dysuria, nocturia, urinary incontinence, hesitancy, decreased stream and hematuria  HEMATOLOGIC/LYMPHATIC:  negative for easy bruising, bleeding, lymphadenopathy and petechiae  ALLERGIC/IMMUNOLOGIC:  negative for recurrent infections, urticaria, hay fever, angioedema, anaphylaxis and drug reactions  ENDOCRINE:  negative for heat intolerance, cold intolerance, tremor, change in bowel habits and hair loss  MUSCULOSKELETAL:  negative for  myalgias, arthralgias, joint swelling, stiff joints and decreased range of motion  NEUROLOGICAL:  negative for headaches, dizziness, seizures, memory problems, speech problems, visual disturbance, coordination problems, gait problems, tremor, dysphagia, weakness, numbness, syncope and tingling  BEHAVIOR/PSYCH:  negative          Physical Exam:    Vitals: BP (!) 147/82 (Site: Right Upper Arm)   Pulse 71   Resp 16   Ht 5' 10\" (1.778 m)   Wt 268 lb (121.6 kg)   SpO2 98% Comment: Room air at rest  BMI 38.45 kg/m²   Last 3 weights: Wt Readings from Last 3 Encounters:   04/30/19 268 lb (121.6 kg)   04/29/19 264 lb (119.7 kg)   04/17/19 264 lb (119.7 kg)     Body mass index is 38.45 kg/m². Physical Examination:   General appearance - alert, well appearing, and in no distress, overweight and acyanotic, in no respiratory distress  Mental status - alert, oriented to person, place, and time  Eyes - pupils equal and reactive, extraocular eye movements intact, sclera anicteric  Ears - right ear normal, left ear normal  Nose - bilateral slightly edematous mucosa and no turbinate hypertrophy and nasal obstruction, no erythema, discharge or polyps, large tongue, small oropharynx. Mouth - mucous membranes moist, pharynx normal without lesions.  Mallampati 2  Neck - supple, no significant adenopathy, very short and thick neck  Chest - clear to auscultation, no wheezes, rales or rhonchi, symmetric air entry, no tachypnea, retractions or April 2018. He will need yearly screening CT scan because of his history of smoking. Plan and recommendations      Continue Spiriva Respimat 2 puff once daily   Albuterol to be used as needed  Vaccinations recommended  and had Pneumonia vaccine 2 years ago  He is up-to-date on pneumonia vaccine  Annual Flu vaccine in fall    Request patient to reschedule Sleep study when he is ready   CPAP titration if needed depending on the baseline study  Wt loss is recommended and discussed  Increase activity and exercise discussed with the patient   Follow good sleep hygeine instructions  Given sleep hygeine instructions    He will need annual screening CT scan of the chest done next should be september 2019     Questions answered pertaining to diagnosis and management explained importance of compliance with therapy     RTC 6 months. It was my pleasure to evaluate Veronique Atkins today. Please call with questions. Sherley Hansen MD             4/30/2019, 11:15 AM      Please note that this chart was generated using voice recognition Dragon dictation software. Although every effort was made to ensure the accuracy of this automated transcription, some errors in transcription may have occurred. Low Dose CT (LDCT) Lung Screening criteria met   Age 50-69   Pack year smoking >30   Still smoking or less than 15 year since quit   No sign or symptoms of lung cancer   > 11 months since last LDCT     Risks and benefits of lung cancer screening with LDCT scans discussed:    Significance of positive screen - False-positive LDCT results often occur. 95% of all positive results do not lead to a diagnosis of cancer. Usually further imaging can resolve most false-positive results; however, some patients may require invasive procedures.     Over diagnosis risk - 10% to 12% of screen-detected lung cancer cases are over diagnosed--that is, the cancer would not have been detected in the patient's lifetime without the screening. Need for follow up screens annually to continue lung cancer screening effectiveness     Risks associated with radiation from annual LDCT- Radiation exposure is about the same as for a mammogram, which is about 1/3 of the annual background radiation exposure from everyday life. Starting screening at age 54 is not likely to increase cancer risk from radiation exposure. Patients with comorbidities resulting in life expectancy of < 10 years, or that would preclude treatment of an abnormality identified on CT, should not be screened due to lack of benefit.     To obtain maximal benefit from this screening, smoking cessation and long-term abstinence from smoking is critical

## 2019-05-08 ENCOUNTER — TELEPHONE (OUTPATIENT)
Dept: GASTROENTEROLOGY | Age: 64
End: 2019-05-08

## 2019-05-15 LAB
EKG ATRIAL RATE: 70 BPM
EKG P AXIS: 57 DEGREES
EKG P-R INTERVAL: 162 MS
EKG Q-T INTERVAL: 398 MS
EKG QRS DURATION: 88 MS
EKG QTC CALCULATION (BAZETT): 429 MS
EKG R AXIS: 68 DEGREES
EKG T AXIS: 66 DEGREES
EKG VENTRICULAR RATE: 70 BPM

## 2019-05-30 ENCOUNTER — OFFICE VISIT (OUTPATIENT)
Dept: GASTROENTEROLOGY | Age: 64
End: 2019-05-30
Payer: COMMERCIAL

## 2019-05-30 VITALS
DIASTOLIC BLOOD PRESSURE: 90 MMHG | SYSTOLIC BLOOD PRESSURE: 150 MMHG | HEART RATE: 74 BPM | WEIGHT: 274.9 LBS | BODY MASS INDEX: 39.44 KG/M2

## 2019-05-30 DIAGNOSIS — K21.9 GASTROESOPHAGEAL REFLUX DISEASE, ESOPHAGITIS PRESENCE NOT SPECIFIED: Primary | ICD-10-CM

## 2019-05-30 DIAGNOSIS — D12.6 TUBULAR ADENOMA OF COLON: ICD-10-CM

## 2019-05-30 PROCEDURE — 99213 OFFICE O/P EST LOW 20 MIN: CPT | Performed by: INTERNAL MEDICINE

## 2019-05-30 ASSESSMENT — ENCOUNTER SYMPTOMS
DIARRHEA: 0
BACK PAIN: 0
BLOOD IN STOOL: 0
SINUS PRESSURE: 0
WHEEZING: 0
NAUSEA: 0
SORE THROAT: 0
TROUBLE SWALLOWING: 0
CHOKING: 0
VOMITING: 0
ABDOMINAL PAIN: 0
COUGH: 0
RECTAL PAIN: 0
ABDOMINAL DISTENTION: 0
VOICE CHANGE: 0
GASTROINTESTINAL NEGATIVE: 1
CONSTIPATION: 0
RESPIRATORY NEGATIVE: 1
ANAL BLEEDING: 0

## 2019-05-30 NOTE — PROGRESS NOTES
Subjective:      Patient ID: Steve Almonte is a 70 y.o. male. Dr. Angel Le, APRN - CNP our mutual patient Steve Almonte was seen  for   1. Gastroesophageal reflux disease, esophagitis presence not specified    2. Tubular adenoma of colon     . Patient seen for follow-up of GERD and past history of colon polyps. Recently patient had a colonoscopy done and was found to have small polyps, histology revealed tubular adenomas. EGD unremarkable and biopsies from the esophagus did not reveal abnormal pathology. On further discussion he is doing reasonably well. He has been taking Prevacid 30 mg a day GERD symptoms are better. No dysphagia. Has a good appetite and there is no weight loss. Bowel movements satisfactory. No hematochezia. Past Medical, Family, and Social History reviewed and does contribute to the patient presenting condition. patient\"s PMH/PSH,SH,PSYCH hx, MEDs, ALLERGIES, and ROS was all reviewed and updated ion the appropriate sections      HPI    Review of Systems   Constitutional: Negative. Negative for appetite change, fatigue and unexpected weight change. HENT: Negative. Negative for dental problem, postnasal drip, sinus pressure, sore throat, trouble swallowing and voice change. Eyes: Positive for visual disturbance (glasses). Respiratory: Negative. Negative for cough, choking and wheezing. Cardiovascular: Negative. Negative for chest pain, palpitations and leg swelling. Gastrointestinal: Negative. Negative for abdominal distention, abdominal pain, anal bleeding, blood in stool, constipation, diarrhea, nausea, rectal pain and vomiting. Genitourinary: Negative. Negative for difficulty urinating. Musculoskeletal: Negative. Negative for arthralgias, back pain, gait problem and myalgias. Allergic/Immunologic: Positive for environmental allergies (seasonal). Negative for food allergies. Neurological: Negative.   Negative for dizziness, weakness, light-headedness, numbness and headaches. Hematological: Negative. Does not bruise/bleed easily. Psychiatric/Behavioral: Negative. Negative for sleep disturbance. The patient is not nervous/anxious. Objective:   Physical Exam   Constitutional: He is oriented to person, place, and time. He appears well-developed and well-nourished. No distress. HENT:   Head: Normocephalic and atraumatic. Mouth/Throat: No oropharyngeal exudate. Eyes: Pupils are equal, round, and reactive to light. Conjunctivae are normal. No scleral icterus. Neck: Normal range of motion. Neck supple. No tracheal deviation present. No thyromegaly present. Cardiovascular: Normal rate, regular rhythm, normal heart sounds and intact distal pulses. No murmur heard. Pulmonary/Chest: Effort normal and breath sounds normal. No respiratory distress. He has no wheezes. He has no rales. Abdominal: Soft. Bowel sounds are normal. He exhibits no distension, no ascites and no mass. There is no hepatomegaly. There is no tenderness. There is no guarding. No hernia. No peripheral signs of ch. Liver disease   Genitourinary: Rectum normal.   Musculoskeletal: He exhibits no edema. Lymphadenopathy:     He has no cervical adenopathy. Neurological: He is alert and oriented to person, place, and time. No cranial nerve deficit. Skin: Skin is warm and dry. No rash noted. No erythema. Psychiatric: Thought content normal.   Nursing note and vitals reviewed. Assessment:       Diagnosis Orders   1. Gastroesophageal reflux disease, esophagitis presence not specified     2. Tubular adenoma of colon             Plan:      Patient looks stable. He is explained regarding EGD and colonoscopy findings and reassured. Given that he has recurrent polyps, may need colonoscopy in the next 3 years. After discussion, patient understood and verbalized close follow-up. Also advised to continue antireflux measures and to lose some weight.

## 2019-06-13 PROBLEM — M79.89 SWELLING OF BOTH LOWER EXTREMITIES: Status: ACTIVE | Noted: 2019-06-13

## 2019-06-13 PROBLEM — E66.1 CLASS 2 DRUG-INDUCED OBESITY WITH SERIOUS COMORBIDITY AND BODY MASS INDEX (BMI) OF 38.0 TO 38.9 IN ADULT: Status: ACTIVE | Noted: 2019-06-13

## 2019-06-13 PROBLEM — E66.812 CLASS 2 DRUG-INDUCED OBESITY WITH SERIOUS COMORBIDITY AND BODY MASS INDEX (BMI) OF 38.0 TO 38.9 IN ADULT: Status: ACTIVE | Noted: 2019-06-13

## 2019-06-13 PROBLEM — Z12.5 SCREENING FOR MALIGNANT NEOPLASM OF PROSTATE: Status: ACTIVE | Noted: 2019-06-13

## 2019-06-13 PROBLEM — R73.9 HYPERGLYCEMIA: Status: ACTIVE | Noted: 2019-06-13

## 2019-07-13 PROBLEM — Z12.5 SCREENING FOR MALIGNANT NEOPLASM OF PROSTATE: Status: RESOLVED | Noted: 2019-06-13 | Resolved: 2019-07-13

## 2019-09-04 ENCOUNTER — TELEPHONE (OUTPATIENT)
Dept: ONCOLOGY | Age: 64
End: 2019-09-04

## 2019-09-04 ENCOUNTER — TELEPHONE (OUTPATIENT)
Dept: PULMONOLOGY | Age: 64
End: 2019-09-04

## 2019-09-04 NOTE — LETTER
9/4/2019        [address]    Dear Sukumar Hein:    Your healthcare provider has ordered a low dose CT scan of the chest for lung cancer screening. You will find enclosed, information about CT lung screening. Please review the statement of understanding, you will be asked to sign a copy of this at the time of your CT scan    If you have not already been contacted to make the appointment for your scan, please call our scheduling department at [phone]    Keep in mind that CT lung screening does not take the place of smoking cessation. If you are a current smoker, you will find enclosed smoking cessation resources. Please do not hesitate to contact me if you have any questions or concerns.     [address] [place] [address],      [facility] Screening [facility]  [phone]

## 2019-09-25 ENCOUNTER — HOSPITAL ENCOUNTER (OUTPATIENT)
Dept: CT IMAGING | Age: 64
Discharge: HOME | End: 2019-09-27
Payer: MEDICARE

## 2019-09-25 DIAGNOSIS — Z87.891 PERSONAL HISTORY OF TOBACCO USE: ICD-10-CM

## 2019-09-25 PROCEDURE — G0297 LDCT FOR LUNG CA SCREEN: HCPCS

## 2019-10-09 ENCOUNTER — HOSPITAL ENCOUNTER (OUTPATIENT)
Age: 64
Discharge: HOME | End: 2019-10-09
Payer: MEDICARE

## 2019-10-09 ENCOUNTER — HOSPITAL ENCOUNTER (OUTPATIENT)
Dept: CT IMAGING | Age: 64
Discharge: HOME | End: 2019-10-11
Payer: MEDICARE

## 2019-10-09 DIAGNOSIS — R73.9 HYPERGLYCEMIA: ICD-10-CM

## 2019-10-09 DIAGNOSIS — R14.0 ABDOMINAL BLOATING: ICD-10-CM

## 2019-10-09 DIAGNOSIS — E78.00 PURE HYPERCHOLESTEROLEMIA: ICD-10-CM

## 2019-10-09 DIAGNOSIS — Z12.5 SCREENING FOR MALIGNANT NEOPLASM OF PROSTATE: ICD-10-CM

## 2019-10-09 DIAGNOSIS — I10 ESSENTIAL HYPERTENSION: ICD-10-CM

## 2019-10-09 LAB
ALBUMIN SERPL-MCNC: 4.5 G/DL (ref 3.5–5.2)
ALBUMIN/GLOBULIN RATIO: ABNORMAL (ref 1–2.5)
ALP BLD-CCNC: 70 U/L (ref 40–129)
ALT SERPL-CCNC: 29 U/L (ref 5–41)
ANION GAP SERPL CALCULATED.3IONS-SCNC: 14 MMOL/L (ref 9–17)
AST SERPL-CCNC: 26 U/L
BILIRUB SERPL-MCNC: 0.77 MG/DL (ref 0.3–1.2)
BUN BLDV-MCNC: 13 MG/DL (ref 8–23)
BUN/CREAT BLD: ABNORMAL (ref 9–20)
CALCIUM SERPL-MCNC: 10.1 MG/DL (ref 8.6–10.4)
CHLORIDE BLD-SCNC: 97 MMOL/L (ref 98–107)
CHOLESTEROL/HDL RATIO: 2.5
CHOLESTEROL: 173 MG/DL
CO2: 27 MMOL/L (ref 20–31)
CREAT SERPL-MCNC: 1.02 MG/DL (ref 0.7–1.2)
ESTIMATED AVERAGE GLUCOSE: 97 MG/DL
GFR AFRICAN AMERICAN: >60 ML/MIN
GFR NON-AFRICAN AMERICAN: >60 ML/MIN
GFR SERPL CREATININE-BSD FRML MDRD: ABNORMAL ML/MIN/{1.73_M2}
GFR SERPL CREATININE-BSD FRML MDRD: ABNORMAL ML/MIN/{1.73_M2}
GLUCOSE BLD-MCNC: 118 MG/DL (ref 70–99)
HBA1C MFR BLD: 5 % (ref 4–6)
HCT VFR BLD CALC: 44.7 % (ref 41–53)
HDLC SERPL-MCNC: 68 MG/DL
HEMOGLOBIN: 15.3 G/DL (ref 13.5–17.5)
LDL CHOLESTEROL: 76 MG/DL (ref 0–130)
MCH RBC QN AUTO: 32.2 PG (ref 26–34)
MCHC RBC AUTO-ENTMCNC: 34.2 G/DL (ref 31–37)
MCV RBC AUTO: 94.4 FL (ref 80–100)
NRBC AUTOMATED: NORMAL PER 100 WBC
PDW BLD-RTO: 12.3 % (ref 11.5–14.9)
PLATELET # BLD: 263 K/UL (ref 150–450)
PMV BLD AUTO: 7 FL (ref 6–12)
POTASSIUM SERPL-SCNC: 4.7 MMOL/L (ref 3.7–5.3)
PROSTATE SPECIFIC ANTIGEN: 1.33 UG/L
RBC # BLD: 4.74 M/UL (ref 4.5–5.9)
SODIUM BLD-SCNC: 138 MMOL/L (ref 135–144)
TOTAL PROTEIN: 7.6 G/DL (ref 6.4–8.3)
TRIGL SERPL-MCNC: 146 MG/DL
VLDLC SERPL CALC-MCNC: NORMAL MG/DL (ref 1–30)
WBC # BLD: 7.5 K/UL (ref 3.5–11)

## 2019-10-09 PROCEDURE — 74176 CT ABD & PELVIS W/O CONTRAST: CPT

## 2019-10-09 PROCEDURE — 80053 COMPREHEN METABOLIC PANEL: CPT

## 2019-10-09 PROCEDURE — 83036 HEMOGLOBIN GLYCOSYLATED A1C: CPT

## 2019-10-09 PROCEDURE — 74150 CT ABDOMEN W/O CONTRAST: CPT

## 2019-10-09 PROCEDURE — 80061 LIPID PANEL: CPT

## 2019-10-09 PROCEDURE — 6360000004 HC RX CONTRAST MEDICATION: Performed by: NURSE PRACTITIONER

## 2019-10-09 PROCEDURE — G0103 PSA SCREENING: HCPCS

## 2019-10-09 PROCEDURE — 36415 COLL VENOUS BLD VENIPUNCTURE: CPT

## 2019-10-09 PROCEDURE — 85027 COMPLETE CBC AUTOMATED: CPT

## 2019-10-09 RX ADMIN — IOHEXOL 50 ML: 240 INJECTION, SOLUTION INTRATHECAL; INTRAVASCULAR; INTRAVENOUS; ORAL at 10:30

## 2019-10-29 ENCOUNTER — OFFICE VISIT (OUTPATIENT)
Dept: PULMONOLOGY | Age: 64
End: 2019-10-29
Payer: MEDICARE

## 2019-10-29 VITALS
HEIGHT: 70 IN | HEART RATE: 72 BPM | BODY MASS INDEX: 38.65 KG/M2 | SYSTOLIC BLOOD PRESSURE: 150 MMHG | OXYGEN SATURATION: 99 % | WEIGHT: 270 LBS | DIASTOLIC BLOOD PRESSURE: 89 MMHG

## 2019-10-29 DIAGNOSIS — R91.1 LUNG NODULE: ICD-10-CM

## 2019-10-29 DIAGNOSIS — J44.9 CHRONIC OBSTRUCTIVE PULMONARY DISEASE, UNSPECIFIED COPD TYPE (HCC): Primary | ICD-10-CM

## 2019-10-29 DIAGNOSIS — G47.33 OSA (OBSTRUCTIVE SLEEP APNEA): ICD-10-CM

## 2019-10-29 PROCEDURE — 99214 OFFICE O/P EST MOD 30 MIN: CPT | Performed by: INTERNAL MEDICINE

## 2019-12-10 ENCOUNTER — TELEPHONE (OUTPATIENT)
Dept: CASE MANAGEMENT | Age: 64
End: 2019-12-10

## 2020-04-13 ENCOUNTER — HOSPITAL ENCOUNTER (OUTPATIENT)
Age: 65
Setting detail: SPECIMEN
Discharge: HOME | End: 2020-04-13
Payer: MEDICARE

## 2020-04-13 LAB
ALBUMIN SERPL-MCNC: 4.1 G/DL (ref 3.5–5.2)
ALBUMIN/GLOBULIN RATIO: 1.3 (ref 1–2.5)
ALP BLD-CCNC: 72 U/L (ref 40–129)
ALT SERPL-CCNC: 23 U/L (ref 5–41)
ANION GAP SERPL CALCULATED.3IONS-SCNC: 15 MMOL/L (ref 9–17)
AST SERPL-CCNC: 21 U/L
BILIRUB SERPL-MCNC: 0.81 MG/DL (ref 0.3–1.2)
BUN BLDV-MCNC: 12 MG/DL (ref 8–23)
BUN/CREAT BLD: ABNORMAL (ref 9–20)
CALCIUM SERPL-MCNC: 9.7 MG/DL (ref 8.6–10.4)
CHLORIDE BLD-SCNC: 97 MMOL/L (ref 98–107)
CHOLESTEROL/HDL RATIO: 2.6
CHOLESTEROL: 174 MG/DL
CO2: 25 MMOL/L (ref 20–31)
CREAT SERPL-MCNC: 0.96 MG/DL (ref 0.7–1.2)
GFR AFRICAN AMERICAN: >60 ML/MIN
GFR NON-AFRICAN AMERICAN: >60 ML/MIN
GFR SERPL CREATININE-BSD FRML MDRD: ABNORMAL ML/MIN/{1.73_M2}
GFR SERPL CREATININE-BSD FRML MDRD: ABNORMAL ML/MIN/{1.73_M2}
GLUCOSE BLD-MCNC: 111 MG/DL (ref 70–99)
HDLC SERPL-MCNC: 66 MG/DL
LDL CHOLESTEROL: 75 MG/DL (ref 0–130)
POTASSIUM SERPL-SCNC: 4.4 MMOL/L (ref 3.7–5.3)
SODIUM BLD-SCNC: 137 MMOL/L (ref 135–144)
TOTAL PROTEIN: 7.3 G/DL (ref 6.4–8.3)
TRIGL SERPL-MCNC: 167 MG/DL
VLDLC SERPL CALC-MCNC: ABNORMAL MG/DL (ref 1–30)

## 2020-04-14 LAB
ESTIMATED AVERAGE GLUCOSE: 97 MG/DL
HBA1C MFR BLD: 5 % (ref 4–6)

## 2020-09-21 ENCOUNTER — TELEPHONE (OUTPATIENT)
Dept: PULMONOLOGY | Age: 65
End: 2020-09-21

## 2020-09-21 NOTE — TELEPHONE ENCOUNTER
Patient called and said he needs to be scheduled for a CT lung screen. I do not see an order for this but his last one was Sept 2019. Could you please order?

## 2020-09-21 NOTE — TELEPHONE ENCOUNTER
Low-dose CT ordered please schedule  Patient was seen last time and September and he was supposed to come back in 6 months.   Please schedule appointment for follow-up

## 2020-09-23 ENCOUNTER — TELEPHONE (OUTPATIENT)
Dept: ONCOLOGY | Age: 65
End: 2020-09-23

## 2020-09-30 ENCOUNTER — HOSPITAL ENCOUNTER (OUTPATIENT)
Dept: CT IMAGING | Age: 65
Discharge: HOME | End: 2020-10-02
Payer: MEDICARE

## 2020-09-30 PROCEDURE — G0297 LDCT FOR LUNG CA SCREEN: HCPCS

## 2020-10-06 ENCOUNTER — OFFICE VISIT (OUTPATIENT)
Dept: PULMONOLOGY | Age: 65
End: 2020-10-06
Payer: MEDICARE

## 2020-10-06 VITALS
OXYGEN SATURATION: 98 % | HEART RATE: 78 BPM | DIASTOLIC BLOOD PRESSURE: 97 MMHG | TEMPERATURE: 98.2 F | HEIGHT: 71 IN | WEIGHT: 270 LBS | BODY MASS INDEX: 37.8 KG/M2 | RESPIRATION RATE: 18 BRPM | SYSTOLIC BLOOD PRESSURE: 163 MMHG

## 2020-10-06 PROCEDURE — 99214 OFFICE O/P EST MOD 30 MIN: CPT | Performed by: INTERNAL MEDICINE

## 2020-10-06 ASSESSMENT — SLEEP AND FATIGUE QUESTIONNAIRES
HOW LIKELY ARE YOU TO NOD OFF OR FALL ASLEEP IN A CAR, WHILE STOPPED FOR A FEW MINUTES IN TRAFFIC: 0
HOW LIKELY ARE YOU TO NOD OFF OR FALL ASLEEP WHILE SITTING QUIETLY AFTER LUNCH WITHOUT ALCOHOL: 0
HOW LIKELY ARE YOU TO NOD OFF OR FALL ASLEEP WHEN YOU ARE A PASSENGER IN A CAR FOR AN HOUR WITHOUT A BREAK: 0
HOW LIKELY ARE YOU TO NOD OFF OR FALL ASLEEP WHILE SITTING INACTIVE IN A PUBLIC PLACE: 0
ESS TOTAL SCORE: 0
HOW LIKELY ARE YOU TO NOD OFF OR FALL ASLEEP WHILE SITTING AND READING: 0
HOW LIKELY ARE YOU TO NOD OFF OR FALL ASLEEP WHILE WATCHING TV: 0
HOW LIKELY ARE YOU TO NOD OFF OR FALL ASLEEP WHILE LYING DOWN TO REST IN THE AFTERNOON WHEN CIRCUMSTANCES PERMIT: 0
HOW LIKELY ARE YOU TO NOD OFF OR FALL ASLEEP WHILE SITTING AND TALKING TO SOMEONE: 0

## 2020-10-06 NOTE — PROGRESS NOTES
OUTPATIENT PULMONARY PROGRESS NOTE      Patient:  Arturo Weber  MRN: [id number]    Consulting Physician: Azul Middleton  Reason for Consult: COPD  Primacy Care Physician: Azul Middleton, APRN - CNP    HISTORY OF PRESENT ILLNESS:   The patient is a 77 y.o. male   He is therefore follow-up of COPD lung nodule, history of chronic sinusitis and likely obstructive sleep apnea    Since he was seen last time he denies any change in his symptoms. His cough is usually in the morning usually bring sputum in the morning and then after that he usually does not have cough and denies daily or persistent cough. He denies shortness of breath he is able to do regular activities he walks with the dog outside sometimes he has to stop if he has to do or walk too fast otherwise able to do regular activities at home without getting shortness of breath. He denies waking up at night with cough wheezing or chest tightness and denies orthopnea and PND. He is taking Spiriva once daily he does not require albuterol and does not think that he need anything else besides Spiriva. He does feel congested after taking Spiriva and able to bring sputum which helped him to clear his throat and lung. He does have history of chronic sinusitis postnasal drip he had sinus surgery felt better after that and does use a nasal rinse. He had history of lung nodules and had a CT scan done on 09/30/2020 for low-dose screening. Which showed 6 to 7 mm nodule in the right middle lobe on minor fissure which according to radiology stable he also have left lung nodule on the left major fissure. On my review the lung nodule in right middle lobe is about the same size possibly slight change in shape but there is a slight groundglass opacity just above the nodule which I was not able to see on CT scan on 09/25/2019. I have discussed with him about sleep study in the past have ordered it he had not schedule it sleep study yet.   He does have more episode of feeling of choking at night while sleeping and waking up. He does have a snoring. He does have some witnessed apnea and according to him his wife sometime wake him up. He has less of those symptoms when he sleep on the side. He goes to sleep and after 4 hours he usually wake up he does have multiple awakening at night but when he wake up he denies unrefreshed sleep. Denies taking naps denies dozing during the daytime. He does have weight gain in last few years especially after he retired last year and he stopped smoking 5 to 6 years ago. She had initially a screening CT scan done which shows right upper lobe and right middle lobe nodule in April 018 and a follow-up CT scan in September 018 showed stable benign-appearing pleural-based lung nodules, Ct scan done September 25 2019 showed stable lung nodules as compare to September 2018        Initial visit/previous visits summary  He was referred here for evaluation of COPD and he was scheduled for a screening CT scan of the chest and also for sleep study. He was diagnosed with stage II COPD at that time and he was restarted on his Spiriva to spray once daily   He denies daily and persistent chronic cough and since he started using Spiriva despite and he use to have which was brown in color is more lighter and yellowish. Positive post nasal drip and nasal obstruction on R side and on Flonase nasal spray   He never was diagnosed with obstructive sleep apnea but he was told by his wife that he does have snoring he occasionally wake himself with snoring. He denies having any witnessed apnea sometimes wake up with choking sensation. He does have dry mouth when he wake up, but denies any fatigue and tiredness during the daytime and he denies any headache in the morning. He denies unrefreshed sleep he does have history of weight gain. He denies forgetfulness or decreased concentration.         ESS is     Sleep Medicine 10/6/2020 10/30/2018 1/3/2018   Sitting and reading 0 0 0   Watching TV 0 0 0   Sitting, inactive in a public place (e.g. a theatre or a meeting) 0 0 0   As a passenger in a car for an hour without a break 0 0 0   Lying down to rest in the afternoon when circumstances permit 0 0 0   Sitting and talking to someone 0 0 0   Sitting quietly after a lunch without alcohol 0 0 0   In a car, while stopped for a few minutes in traffic 0 0 0   Total score 0 0 0       Past Medical History:        Diagnosis Date    Allergic rhinitis     Anxiety     Arthritis     Gilliam esophagus     Bleeding ulcer     Colon polyps     [facility] [address]     Environmental allergies     GERD (gastroesophageal reflux disease)     Hyperlipidemia     Hyperplastic colon polyp     Hypertension     Lung nodule     has follow up CT scans    Perforated stomach ([facility] [address] 1985    Pure hypercholesterolemia     Tubular adenoma of colon 1/2012     Dr. Aster Zapata colonoscopy       Past Surgical History:        Procedure Laterality Date    APPENDECTOMY      COLONOSCOPY      COLONOSCOPY  01/06/2012    COLONOSCOPY N/A 4/29/2019    COLONOSCOPY POLYPECTOMY SNARE/COLD BIOPSY performed [facility] [id number] [facility] NASAL SINUS SURGERY  10/05/2018    STOMACH SURGERY      Perforated Stomach repair    TONSILLECTOMY      UMBILICAL HERNIA REPAIR  7/2011    UPPER GASTROINTESTINAL ENDOSCOPY      UPPER GASTROINTESTINAL ENDOSCOPY N/A 4/29/2019    EGD BIOPSY performed by Mark French MD at [address]       Allergies:     Allergies   Allergen Reactions    Compazine Spansule  [Prochlorperazine]      Other reaction(s): Unknown    Metoclopramide     Prochlorperazine Edisylate      seizure    Reglan  [Metoclopramide Hcl]      Other reaction(s): Unknown    Reglan [Metoclopramide Hcl]      seizure    Seasonal          Home Meds:   Outpatient Encounter Medications as of 10/6/2020   Medication Sig Dispense Refill    fluticasone (FLONASE) 50 MCG/ACT nasal spray USE 2 SPRAYS NASALLY DAILY 3 Bottle 3    lansoprazole (PREVACID) 30 MG delayed release capsule Take 1 capsule by mouth daily 90 capsule 3    tiotropium (SPIRIVA RESPIMAT) 2.5 MCG/ACT AERS inhaler Inhale 2 puffs into the lungs daily 3 Inhaler 3    simvastatin (ZOCOR) 20 MG tablet TAKE 1 TABLET NIGHTLY 90 tablet 3    amLODIPine (NORVASC) 10 MG tablet TAKE 1 TABLET DAILY 90 tablet 4    acyclovir (ZOVIRAX) 800 MG tablet Si tablet in the am and 1 in the pm for each cold sore, may repeat as needed. (total of 2 tablets for each outbreak) 20 tablet 0    aspirin 81 MG tablet Take 81 mg by mouth daily.  ALPRAZolam (XANAX) 0.25 MG tablet Take 1 tablet by mouth every 12 hours as needed for Anxiety for up to 30 days. 60 tablet 0     Facility-Administered Encounter Medications as of 10/6/2020   Medication Dose Route Frequency Provider Last Rate Last Dose    triamcinolone acetonide (KENALOG-40) injection 40 mg  40 mg Intramuscular Once Thony Becerra, APRN - CNP           Social History:   TOBACCO:   reports that he quit smoking about 3 years ago. He has a 40.00 pack-year smoking history. 1 PPD, He has never used smokeless tobacco.  ETOH:   reports no history of alcohol use.   OCCUPATION:  Refinery worker operational coordinator, office work    Family History:       Problem Relation Age of Onset    Cancer Mother         thyroid    Thyroid Disease Sister     Heart Attack Paternal Grandfather        Immunizations:    Immunization History   Administered Date(s) Administered    Influenza Vaccine, unspecified formulation 2016, 10/30/2018    Influenza Virus Vaccine 10/30/2013, 10/15/2014, 10/16/2015, 10/25/2017    Influenza, High Dose (Fluzone 65 yrs and older) 2018    Influenza, Triv, inactivated, subunit, adjuvanted, IM (Fluad 65 yrs and older) 10/17/2019    Pneumococcal Conjugate 13-valent ([id number]) 2018    Pneumococcal Polysaccharide ([id number]) 12/08/2015    Tdap (Boostrix, Adacel) 06/06/2012         REVIEW OF SYSTEMS:  CONSTITUTIONAL:  negative for  fevers, chills, sweats, fatigue, malaise, anorexia and weight loss  EYES:  negative for  double vision, blurred vision, dry eyes, eye discharge, visual disturbance, irritation, redness and icterus  HEENT:  Negative for postnasal dripping and nasal obstruction and positive for decrease hearing and tinnitus sometime, negative ear drainage, nasal congestion, epistaxis, sore mouth, sore throat and hoarseness  RESPIRATORY:  Positive for morning cough, negative for  dyspnea, wheezing, hemoptysis, chest pain, pleuritic pain and cyanosis  CARDIOVASCULAR:  negative for  chest pain, palpitations, orthopnea, PND, exertional chest pressure/discomfort, fatigue, early saiety, edema, syncope  GASTROINTESTINAL:  Negative for reflux, nausea, vomiting, change in bowel habits, diarrhea, constipation, abdominal pain, abdominal mass, abdominal distention, dysphagia, regurgitation, odynophagia, hematemesis and hemtochezia  GENITOURINARY:  negative for frequency, dysuria, nocturia, urinary incontinence, hesitancy, decreased stream and hematuria  HEMATOLOGIC/LYMPHATIC:  negative for easy bruising, bleeding, lymphadenopathy and petechiae  ALLERGIC/IMMUNOLOGIC:  negative for recurrent infections, urticaria, hay fever, angioedema, anaphylaxis and drug reactions  ENDOCRINE:  negative for heat intolerance, cold intolerance, tremor, change in bowel habits and hair loss  MUSCULOSKELETAL:  negative for  myalgias, arthralgias, joint swelling, stiff joints and decreased range of motion  NEUROLOGICAL:  negative for headaches, dizziness, seizures, memory problems, speech problems, visual disturbance, coordination problems, gait problems, tremor, dysphagia, weakness, numbness, syncope and tingling  BEHAVIOR/PSYCH:  negative          Physical Exam:    Vitals: BP (!) 163/97 (Site: Right Upper Arm, Position: Sitting, Cuff Size: Large Adult) Pulse 78   Temp 98.2 °F (36.8 °C) (Temporal)   Resp 18   Ht 5' 11\" (1.803 m)   Wt 270 lb (122.5 kg)   SpO2 98%   BMI 37.66 kg/m²   Last 3 weights: Wt Readings from Last 3 Encounters:   10/06/20 270 lb (122.5 kg)   07/02/20 274 lb (124.3 kg)   04/15/20 265 lb (120.2 kg)     Body mass index is 37.66 kg/m². Physical Examination:   General appearance - alert, well appearing, and in no distress, overweight and acyanotic, in no respiratory distress  Mental status - alert, oriented to person, place, and time  Eyes - pupils equal and reactive, extraocular eye movements intact, sclera anicteric  Ears - right ear normal, left ear normal  Nose - bilateral slightly edematous mucosa and no turbinate hypertrophy and nasal obstruction, no erythema, discharge or polyps, large tongue, small oropharynx. Mouth - mucous membranes moist, pharynx normal without lesions. Mallampati 2  Neck - supple, no significant adenopathy, very short and thick neck  Chest -no tachypnea or retraction or cyanosis, bilateral symmetrical chest movement, air entry is bilaterally present slightly distant breath sound no expiratory wheezing rhonchi crackles.    Heart - normal rate, regular rhythm, normal S1, S2, no murmurs, rubs, clicks or gallops  Abdomen - soft, nontender, nondistended, no masses or organomegaly  Neurological - alert, oriented, normal speech, no focal findings or movement disorder noted  Extremities - peripheral pulses normal, no pedal edema, no clubbing or cyanosis  Skin - normal coloration and turgor, no rashes, no suspicious skin lesions noted       LABS:    CBC:   WBC   Date Value Ref Range Status   10/09/2019 7.5 3.5 - 11.0 k/uL Final   04/17/2019 7.6 3.5 - 11.0 k/uL Final   01/12/2019 7.2 3.5 - 11.0 k/uL Final     Hemoglobin   Date Value Ref Range Status   10/09/2019 15.3 13.5 - 17.5 g/dL Final   04/17/2019 14.9 13.5 - 17.5 g/dL Final   01/12/2019 15.7 13.5 - 17.5 g/dL Final     Platelet Count   Date Value Ref Range Status 40 - 129 U/L Final   01/12/2019 80 40 - 129 U/L Final     Amylase: No results found for: AMYLASE  Lipase: No results found for: LIPASE  CARDIAC ENZYMES: No results found for: CKTOTAL, CKMB, CKMBINDEX, TROPONINI  BNP: No results found for: BNP  Lipids:   Cholesterol   Date Value Ref Range Status   04/13/2020 174 <200 mg/dL Final     Comment:        Cholesterol Guidelines:      <200  Desirable   200-240  Borderline      >240  Undesirable          HDL   Date Value Ref Range Status   04/13/2020 66 >40 mg/dL Final     Comment:        HDL Guidelines:    <40     Undesirable   40-59    Borderline    >59     Desirable            INR: No results found for: INR  Thyroid: No results found for: TSH  Urinalysis: No results found for: BACTERIA, BLOODU, CLARITYU, COLORU, PHUR, PROTEINU, RBCUA, [address], BILIRUBINUR, NITRU, [address], LEUKOCYTESUR, GLUCOSEU  Cultures:-  -----------------------------------------------------------------    Immunization History   Administered Date(s) Administered    Influenza Vaccine, unspecified formulation 11/07/2016, 10/30/2018    Influenza Virus Vaccine 10/30/2013, 10/15/2014, 10/16/2015, 10/25/2017    Influenza, High Dose (Fluzone 65 yrs and older) 11/05/2018    Influenza, Triv, inactivated, subunit, adjuvanted, IM (Fluad 65 yrs and older) 10/17/2019    Pneumococcal Conjugate 13-valent ([id number]) 08/14/2018    Pneumococcal Polysaccharide ([id number]) 12/08/2015    Tdap (Boostrix, Adacel) 06/06/2012     ABGs: No results found for: PHART, PO2ART, [id number]    Pulmonary Functions Testing Results:    1/3/2017: FEV1 2.07 52%, FVC 3.47 70%, VAM8ZPN 74%, TLC 7.4 102%, DLCO 25.25  94%    CXR 9/22/2017  The cardiomediastinal silhouette is unremarkable.  Aortic vascular   calcification.  The lungs are clear.  No infiltrate, pleural fluid or   failure.  Healed left-sided rib fractures.  No acute osseous findings         CT Scans     09/30/2020.  1. Mild emphysema.  Stable 6 mm right minor fissure and left major fissure    pulmonary nodules.  Mild biapical pleuroparenchymal scarring. 2. Coronary artery disease. 3. Probable pulmonary arterial hypertension. 4. Atherosclerotic calcification of the aorta. 9/25/19  1. Stable multiple pulmonary nodules as detailed above measuring up to 6 mm,   unchanged from 09/24/2018.  Mild emphysema.  Biapical pleuroparenchymal   scarring.  No acute focal airspace consolidation. 2. Mild cardiomegaly.  Coronary artery disease.  Atherosclerotic   calcification of the aorta.       09/24/18  1. Stable appearance of benign-appearing pleural-based lung nodules.  Given   their stability and association with the pleura, findings likely reflect   benign etiologies such as intrapulmonary lymph nodes.  No further follow-up   is suggested. 2. No new or suspicious lung nodules and no lymphadenopathy. 03/26/018  Nonspecific 7-8 mm pleural-based nodular density posterior right upper   lobe/apex on series 2, image 66.  5 mm nodule in the superior right middle   lobe along the fissure as well as 5 mm nodule in the superior left lower lobe   along the fissure both of which likely represent benign intrapulmonary lymph   nodes. EKG:   ECHO:   Stress Test:     Assessment and Plan       ICD-10-CM    1. Lung nodule  R91.1    2. Chronic obstructive pulmonary disease, unspecified COPD type (HCC)  J44.9    3. DEIRDRE (obstructive sleep apnea)  G47.33    4.  Obesity (BMI 35.0-39.9 without comorbidity)  E66.9        Patient Active Problem List   Diagnosis    Allergic rhinitis    Gilliam esophagus    GERD (gastroesophageal reflux disease)    History of smoking    Essential hypertension    Pure hypercholesterolemia     CT scan of the chest with the patient, he has a 7 mm pleural-based nodular thickening which was reported to be 6-7 mm by radiology and also there is a 6 mm right middle lobe nodule, both are stable as compared to CT scan of the chest from April 2018 to sep 2018 and sep 2019. He will need yearly screening CT scan because of history of smoking. Plan and recommendations    Low dose CT scan in 6 months for follow-up of right middle lobe groundglass opacity just above the nodule which was not seen well on CT scan year ago in September 2019 and if that area is stable then yearly CT scan  Continue Spiriva Respimat 2 puff once daily   Albuterol to be used as needed  Vaccinations recommended  and had Pneumonia vaccine 3 years ago  He is up-to-date on pneumonia vaccine  Annual Flu vaccine and he want to get flu vaccine from his PCP    Request patient to reschedule Sleep study and request home sleep study again today  CPAP titration if needed depending on the baseline study  Wt loss is recommended and discussed  Increase activity and exercise discussed with the patient   Follow good sleep hygeine instructions  Given sleep hygeine instructions    Questions answered pertaining to diagnosis and management explained importance of compliance with therapy     RTC 6 months. It was my pleasure to evaluate Priya Núñez today. Please call with questions. Ryan Ayala MD             10/6/2020, 11:19 AM      Please note that this chart was generated using voice recognition Dragon dictation software. Although every effort was made to ensure the accuracy of this automated transcription, some errors in transcription may have occurred. Low Dose CT (LDCT) Lung Screening criteria met   Age 50-69   Pack year smoking >30   Still smoking or less than 15 year since quit   No sign or symptoms of lung cancer   > 11 months since last LDCT     Risks and benefits of lung cancer screening with LDCT scans discussed:    Significance of positive screen - False-positive LDCT results often occur. 95% of all positive results do not lead to a diagnosis of cancer. Usually further imaging can resolve most false-positive results; however, some patients may require invasive procedures.     Over diagnosis risk - 10% to 12% of screen-detected lung cancer cases are over diagnosed--that is, the cancer would not have been detected in the patient's lifetime without the screening. Need for follow up screens annually to continue lung cancer screening effectiveness     Risks associated with radiation from annual LDCT- Radiation exposure is about the same as for a mammogram, which is about 1/3 of the annual background radiation exposure from everyday life. Starting screening at age 54 is not likely to increase cancer risk from radiation exposure. Patients with comorbidities resulting in life expectancy of < 10 years, or that would preclude treatment of an abnormality identified on CT, should not be screened due to lack of benefit.     To obtain maximal benefit from this screening, smoking cessation and long-term abstinence from smoking is critical

## 2020-10-27 ENCOUNTER — HOSPITAL ENCOUNTER (OUTPATIENT)
Dept: SLEEP CENTER | Age: 65
Discharge: HOME | End: 2020-10-29
Payer: MEDICARE

## 2020-10-27 PROCEDURE — G0399 HOME SLEEP TEST/TYPE 3 PORTA: HCPCS

## 2020-10-28 ENCOUNTER — HOSPITAL ENCOUNTER (OUTPATIENT)
Age: 65
Setting detail: SPECIMEN
Discharge: HOME | End: 2020-10-28
Payer: MEDICARE

## 2020-10-28 LAB
ALBUMIN SERPL-MCNC: 4.3 G/DL (ref 3.5–5.2)
ALBUMIN/GLOBULIN RATIO: 1.5 (ref 1–2.5)
ALP BLD-CCNC: 74 U/L (ref 40–129)
ALT SERPL-CCNC: 28 U/L (ref 5–41)
ANION GAP SERPL CALCULATED.3IONS-SCNC: 16 MMOL/L (ref 9–17)
AST SERPL-CCNC: 29 U/L
BILIRUB SERPL-MCNC: 1 MG/DL (ref 0.3–1.2)
BUN BLDV-MCNC: 15 MG/DL (ref 8–23)
BUN/CREAT BLD: NORMAL (ref 9–20)
CALCIUM SERPL-MCNC: 9.9 MG/DL (ref 8.6–10.4)
CHLORIDE BLD-SCNC: 100 MMOL/L (ref 98–107)
CHOLESTEROL/HDL RATIO: 2.6
CHOLESTEROL: 178 MG/DL
CO2: 23 MMOL/L (ref 20–31)
CREAT SERPL-MCNC: 0.86 MG/DL (ref 0.7–1.2)
GFR AFRICAN AMERICAN: >60 ML/MIN
GFR NON-AFRICAN AMERICAN: >60 ML/MIN
GFR SERPL CREATININE-BSD FRML MDRD: NORMAL ML/MIN/{1.73_M2}
GFR SERPL CREATININE-BSD FRML MDRD: NORMAL ML/MIN/{1.73_M2}
GLUCOSE BLD-MCNC: 96 MG/DL (ref 70–99)
HCT VFR BLD CALC: 46.3 % (ref 40.7–50.3)
HDLC SERPL-MCNC: 69 MG/DL
HEMOGLOBIN: 14.8 G/DL (ref 13–17)
LDL CHOLESTEROL: 85 MG/DL (ref 0–130)
MCH RBC QN AUTO: 31.8 PG (ref 25.2–33.5)
MCHC RBC AUTO-ENTMCNC: 32 G/DL (ref 28.4–34.8)
MCV RBC AUTO: 99.4 FL (ref 82.6–102.9)
NRBC AUTOMATED: 0 PER 100 WBC
PDW BLD-RTO: 12.5 % (ref 11.8–14.4)
PLATELET # BLD: 275 K/UL (ref 138–453)
PMV BLD AUTO: 9.3 FL (ref 8.1–13.5)
POTASSIUM SERPL-SCNC: 4.8 MMOL/L (ref 3.7–5.3)
PROSTATE SPECIFIC ANTIGEN: 1.34 UG/L
RBC # BLD: 4.66 M/UL (ref 4.21–5.77)
SODIUM BLD-SCNC: 139 MMOL/L (ref 135–144)
TOTAL PROTEIN: 7.2 G/DL (ref 6.4–8.3)
TRIGL SERPL-MCNC: 122 MG/DL
VLDLC SERPL CALC-MCNC: NORMAL MG/DL (ref 1–30)
WBC # BLD: 6.4 K/UL (ref 3.5–11.3)

## 2020-10-29 LAB
ESTIMATED AVERAGE GLUCOSE: 100 MG/DL
HBA1C MFR BLD: 5.1 % (ref 4–6)

## 2020-11-10 LAB — STATUS: NORMAL

## 2021-01-18 RX ORDER — TIOTROPIUM BROMIDE INHALATION SPRAY 3.12 UG/1
SPRAY, METERED RESPIRATORY (INHALATION)
Qty: 3 INHALER | Refills: 2 | Status: SHIPPED | OUTPATIENT
Start: 2021-01-18 | End: 2021-10-18

## 2021-01-18 NOTE — TELEPHONE ENCOUNTER
Dr Brittney Moreno, patient is current and is scheduled for follow up on 4/13/21. Per last dictation patient to continue this medication. Please sign for refill if ok. Thank you.

## 2021-04-05 ENCOUNTER — HOSPITAL ENCOUNTER (OUTPATIENT)
Dept: CT IMAGING | Age: 66
Discharge: HOME | End: 2021-04-07
Payer: MEDICARE

## 2021-04-05 DIAGNOSIS — R91.1 LUNG NODULE: ICD-10-CM

## 2021-04-05 PROCEDURE — 71250 CT THORAX DX C-: CPT

## 2021-04-13 ENCOUNTER — OFFICE VISIT (OUTPATIENT)
Dept: PULMONOLOGY | Age: 66
End: 2021-04-13
Payer: MEDICARE

## 2021-04-13 VITALS
DIASTOLIC BLOOD PRESSURE: 95 MMHG | SYSTOLIC BLOOD PRESSURE: 164 MMHG | OXYGEN SATURATION: 98 % | WEIGHT: 278 LBS | HEIGHT: 71 IN | TEMPERATURE: 98.7 F | BODY MASS INDEX: 38.92 KG/M2 | HEART RATE: 78 BPM

## 2021-04-13 DIAGNOSIS — J44.9 CHRONIC OBSTRUCTIVE PULMONARY DISEASE, UNSPECIFIED COPD TYPE (HCC): ICD-10-CM

## 2021-04-13 DIAGNOSIS — Z87.891 HISTORY OF SMOKING AT LEAST 1 PACK PER DAY FOR AT LEAST 30 YEARS: ICD-10-CM

## 2021-04-13 DIAGNOSIS — R91.1 LUNG NODULE: Primary | ICD-10-CM

## 2021-04-13 DIAGNOSIS — G47.33 OSA (OBSTRUCTIVE SLEEP APNEA): ICD-10-CM

## 2021-04-13 DIAGNOSIS — E66.9 OBESITY (BMI 35.0-39.9 WITHOUT COMORBIDITY): ICD-10-CM

## 2021-04-13 PROCEDURE — 99213 OFFICE O/P EST LOW 20 MIN: CPT | Performed by: INTERNAL MEDICINE

## 2021-04-13 ASSESSMENT — SLEEP AND FATIGUE QUESTIONNAIRES
ESS TOTAL SCORE: 0
HOW LIKELY ARE YOU TO NOD OFF OR FALL ASLEEP IN A CAR, WHILE STOPPED FOR A FEW MINUTES IN TRAFFIC: 0
HOW LIKELY ARE YOU TO NOD OFF OR FALL ASLEEP WHILE SITTING QUIETLY AFTER LUNCH WITHOUT ALCOHOL: 0
HOW LIKELY ARE YOU TO NOD OFF OR FALL ASLEEP WHILE SITTING INACTIVE IN A PUBLIC PLACE: 0
HOW LIKELY ARE YOU TO NOD OFF OR FALL ASLEEP WHILE WATCHING TV: 0
HOW LIKELY ARE YOU TO NOD OFF OR FALL ASLEEP WHILE LYING DOWN TO REST IN THE AFTERNOON WHEN CIRCUMSTANCES PERMIT: 0
HOW LIKELY ARE YOU TO NOD OFF OR FALL ASLEEP WHEN YOU ARE A PASSENGER IN A CAR FOR AN HOUR WITHOUT A BREAK: 0
HOW LIKELY ARE YOU TO NOD OFF OR FALL ASLEEP WHILE SITTING AND READING: 0

## 2021-04-13 NOTE — PROGRESS NOTES
OUTPATIENT PULMONARY PROGRESS NOTE      Patient:  Adriano Gregg  MRN: [id number]    Consulting Physician: Soni Schaeffer  Reason for Consult: COPD  Primacy Care Physician: Soni Schaeffer, VIKY - CNP    HISTORY OF PRESENT ILLNESS:   The patient is a 77 y.o. male   He is therefore follow-up of COPD lung nodule, history of chronic sinusitis and likely obstructive sleep apnea    Since he was seen last time he did not have any exacerbation ER visit hospitalization antibiotic or steroid use. He does not complain of daily or persistent cough usually he has some cough with sputum production when he uses Spiriva once daily otherwise denies cough. He does not have sputum production change in the color of the sputum or volume the sputum and denies persistent wheezing. He is able to walk and able to do regular activities although he is active and do regular activities and go outside and in summertime go fishing but he had gained some weight since he was seen last time. He denies nocturnal awakening with cough wheezing chest tightness or shortness of breath and denies orthopnea PND or pedal edema. He is taking his Spiriva 2 sprays once daily and he is not requiring albuterol had not use any albuterol since he was seen last time. He had history of chronic sinusitis and postnasal drip symptoms are better after the sinus surgery he use nasal spray some time. He had history of lung nodules and had a CT scan done on 09/30/2020 for low-dose screening. Which showed 6 to 7 mm nodule in the right middle lobe on minor fissure which according to radiology stable he also have left lung nodule on the left major fissure. On my review the lung nodule in right middle lobe is about the same size possibly slight change in shape but there is a slight groundglass opacity just above the nodule which I was not able to see on CT scan on 09/25/2019.   CT scan was ordered for the follow-up of groundglass opacity and was done on 04/05/2021 in the theatre or a meeting) 0 0 0 0   As a passenger in a car for an hour without a break 0 0 0 0   Lying down to rest in the afternoon when circumstances permit 0 0 0 0   Sitting and talking to someone 0 0 0 0   Sitting quietly after a lunch without alcohol 0 0 0 0   In a car, while stopped for a few minutes in traffic 0 0 0 0   Total score 0 0 0 0       Past Medical History:        Diagnosis Date    Allergic rhinitis     Anxiety     Arthritis     Gilliam esophagus     Bleeding ulcer     Colon polyps     [facility]ch[facility]     Environmental allergies     GERD (gastroesophageal reflux disease)     Hyperlipidemia     Hyperplastic colon polyp     Hypertension     Lung nodule     has follow up CT scans    Perforated stomach ([facility] 1985    Pure hypercholesterolemia     Tubular adenoma of colon 1/2012     Dr. Lilian Francis colonoscopy       Past Surgical History:        Procedure Laterality Date    APPENDECTOMY      COLONOSCOPY      COLONOSCOPY  01/06/2012    COLONOSCOPY N/A 4/29/2019    COLONOSCOPY POLYPECTOMY SNARE/COLD BIOPSY performed [facility] [id number] [facility] NASAL SINUS SURGERY  10/05/2018    STOMACH SURGERY      Perforated Stomach repair    TONSILLECTOMY      UMBILICAL HERNIA REPAIR  7/2011    UPPER GASTROINTESTINAL ENDOSCOPY      UPPER GASTROINTESTINAL ENDOSCOPY N/A 4/29/2019    EGD BIOPSY performed by Tom Concepcion MD at [id number]       Allergies:     Allergies   Allergen Reactions    Compazine Spansule  [Prochlorperazine]      Other reaction(s): Unknown    Metoclopramide     Prochlorperazine Edisylate      seizure    Reglan  [Metoclopramide Hcl]      Other reaction(s): Unknown    Reglan [Metoclopramide Hcl]      seizure    Seasonal          Home Meds:   Outpatient Encounter Medications as of 4/13/2021   Medication Sig Dispense Refill    vitamin D3 (CHOLECALCIFEROL) 25 MCG (1000 UT) TABS tablet Take 1 tablet by mouth daily 90 tablet 3    lansoprazole (PREVACID) 30 MG delayed release capsule TAKE 1 CAPSULE DAILY 90 capsule 3    SPIRIVA RESPIMAT 2.5 MCG/ACT AERS inhaler USE 2 INHALATIONS DAILY 3 Inhaler 2    amLODIPine (NORVASC) 10 MG tablet TAKE 1 TABLET DAILY 90 tablet 3    simvastatin (ZOCOR) 20 MG tablet TAKE 1 TABLET NIGHTLY 90 tablet 3    fluticasone (FLONASE) 50 MCG/ACT nasal spray USE 2 SPRAYS NASALLY DAILY 3 Bottle 3    aspirin 81 MG tablet Take 81 mg by mouth daily.  ALPRAZolam (XANAX) 0.25 MG tablet Take 1 tablet by mouth every 12 hours as needed for Anxiety for up to 30 days. 60 tablet 0     Facility-Administered Encounter Medications as of 4/13/2021   Medication Dose Route Frequency Provider Last Rate Last Admin    triamcinolone acetonide (KENALOG-40) injection 40 mg  40 mg Intramuscular Once Kadeem Cisneros, APRN - CNP           Social History:   TOBACCO:   reports that he quit smoking about 3 years ago. He has a 40.00 pack-year smoking history. 1 PPD, He has never used smokeless tobacco.  ETOH:   reports no history of alcohol use.   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, office work    Family History:       Problem Relation Age of Onset    Cancer Mother         thyroid    Thyroid Disease Sister     Heart Attack Paternal Grandfather        Immunizations:    Immunization History   Administered Date(s) Administered    MARGIID-19, Yousif Peter, PF, 30mcg/0.3mL 02/22/2021, 03/15/2021    Influenza Vaccine, unspecified formulation 11/07/2016, 10/30/2018    Influenza Virus Vaccine 10/30/2013, 10/15/2014, 10/16/2015, 10/25/2017, 10/30/2018    Influenza, High Dose (Fluzone 65 yrs and older) 11/05/2018    Influenza, Quadv, adjuvanted, 65 yrs +, IM, PF (Fluad) 10/30/2020    Influenza, Triv, inactivated, subunit, adjuvanted, IM (Fluad 65 yrs and older) 10/17/2019    Pneumococcal Conjugate 13-valent ([id number]) 08/14/2018    Pneumococcal Polysaccharide ([id number]) 12/08/2015    Tdap (Boostrix, Adacel) 06/06/2012         REVIEW OF SYSTEMS:  CONSTITUTIONAL:  negative for  fevers, chills, sweats, fatigue, malaise, anorexia and weight loss  EYES:  negative for  double vision, blurred vision, dry eyes, eye discharge, visual disturbance, irritation, redness and icterus  HEENT:  Negative for postnasal dripping and nasal obstruction and positive for decrease hearing and tinnitus sometime, negative ear drainage, nasal congestion, epistaxis, sore mouth, sore throat and hoarseness  RESPIRATORY: Negative for cough, negative for  dyspnea, wheezing, hemoptysis, chest pain, pleuritic pain and cyanosis  CARDIOVASCULAR:  negative for  chest pain, palpitations, orthopnea, PND, exertional chest pressure/discomfort, fatigue, early saiety, edema, syncope  GASTROINTESTINAL:  Negative for reflux, nausea, vomiting, change in bowel habits, diarrhea, constipation, abdominal pain, abdominal mass, abdominal distention, dysphagia, regurgitation, odynophagia, hematemesis and hemtochezia  GENITOURINARY:  negative for frequency, dysuria, nocturia, urinary incontinence, hesitancy, decreased stream and hematuria  HEMATOLOGIC/LYMPHATIC:  negative for easy bruising, bleeding, lymphadenopathy and petechiae  ALLERGIC/IMMUNOLOGIC:  negative for recurrent infections, urticaria, hay fever, angioedema, anaphylaxis and drug reactions  ENDOCRINE:  negative for heat intolerance, cold intolerance, tremor, change in bowel habits and hair loss  MUSCULOSKELETAL:  negative for  myalgias, arthralgias, joint swelling, stiff joints and decreased range of motion  NEUROLOGICAL:  negative for headaches, dizziness, seizures, memory problems, speech problems, visual disturbance, coordination problems, gait problems, tremor, dysphagia, weakness, numbness, syncope and tingling  BEHAVIOR/PSYCH:  negative          Physical Exam:    Vitals: BP (!) 164/95 (Site: Right Upper Arm, Position: Sitting, Cuff Size: Large Adult)   Pulse 78   Temp 98.7 °F (37.1 °C) Platelets   Date Value Ref Range Status   10/28/2020 275 138 - 453 k/uL Final   10/09/2019 263 150 - 450 k/uL Final   04/17/2019 290 150 - 450 k/uL Final     BMP:   Sodium   Date Value Ref Range Status   10/28/2020 139 135 - 144 mmol/L Final   04/13/2020 137 135 - 144 mmol/L Final   10/09/2019 138 135 - 144 mmol/L Final     Potassium   Date Value Ref Range Status   10/28/2020 4.8 3.7 - 5.3 mmol/L Final   04/13/2020 4.4 3.7 - 5.3 mmol/L Final   10/09/2019 4.7 3.7 - 5.3 mmol/L Final     Chloride   Date Value Ref Range Status   10/28/2020 100 98 - 107 mmol/L Final   04/13/2020 97 (L) 98 - 107 mmol/L Final   10/09/2019 97 (L) 98 - 107 mmol/L Final     CO2   Date Value Ref Range Status   10/28/2020 23 20 - 31 mmol/L Final   04/13/2020 25 20 - 31 mmol/L Final   10/09/2019 27 20 - 31 mmol/L Final     BUN   Date Value Ref Range Status   10/28/2020 15 8 - 23 mg/dL Final   04/13/2020 12 8 - 23 mg/dL Final   10/09/2019 13 8 - 23 mg/dL Final     CREATININE   Date Value Ref Range Status   10/28/2020 0.86 0.70 - 1.20 mg/dL Final   04/13/2020 0.96 0.70 - 1.20 mg/dL Final   10/09/2019 1.02 0.70 - 1.20 mg/dL Final     Glucose   Date Value Ref Range Status   10/28/2020 96 70 - 99 mg/dL Final   04/13/2020 111 (H) 70 - 99 mg/dL Final   10/09/2019 118 (H) 70 - 99 mg/dL Final   01/23/2012 93 74 - 106 mg/dL Final   12/05/2011 101 74 - 106 mg/dL Final     Hepatic:   AST   Date Value Ref Range Status   10/28/2020 29 <40 U/L Final   04/13/2020 21 <40 U/L Final   10/09/2019 26 <40 U/L Final     ALT   Date Value Ref Range Status   10/28/2020 28 5 - 41 U/L Final   04/13/2020 23 5 - 41 U/L Final   10/09/2019 29 5 - 41 U/L Final     Total Bilirubin   Date Value Ref Range Status   10/28/2020 1.00 0.3 - 1.2 mg/dL Final   04/13/2020 0.81 0.3 - 1.2 mg/dL Final   10/09/2019 0.77 0.3 - 1.2 mg/dL Final     Alkaline Phosphatase   Date Value Ref Range Status   10/28/2020 74 40 - 129 U/L Final   04/13/2020 72 40 - 129 U/L Final   10/09/2019 70 40 - 129 thickening which was reported to be 6-7 mm by radiology and also there is a 6 mm right middle lobe nodule, both are stable as compared to CT scan of the chest from April 2018 to sep 2018 and sep 2019. He will need yearly screening CT scan because of history of smoking. Plan and recommendations    CT scan of the chest in April 2021 shows stable lung nodules and is stable for almost 2 years and will go back to low-dose annual screening CT scan and next one would be in April 2022. Continue Spiriva Respimat 2 puff once daily   Albuterol to be used as needed  Vaccinations recommended  and had Pneumonia vaccine 3 years ago  He is up-to-date on pneumonia vaccine  Annual Flu vaccine and he want to get flu vaccine from his PCP  He had covid vaccine this year    Home sleep study results seen today and discussed with patient about CPAP titration versus auto CPAP. Since he has RDI of 6.2 on home sleep study and he denies much symptoms he does not think that he needs CPAP at this time. I have discussed with him about weight gain as his sleep apnea may get worse with weight gain  CPAP titration or auto cpap in the future if needed depending on the symptoms and weight gain  Wt loss is recommended and discussed  Increase activity and exercise discussed with the patient   Follow good sleep hygeine instructions  Given sleep hygeine instructions    Questions answered pertaining to diagnosis and management explained importance of compliance with therapy     RTC 6 months. It was my pleasure to evaluate Mar Fruit today. Please call with questions. Rhea Green MD             4/13/2021, 10:00 AM      Please note that this chart was generated using voice recognition Dragon dictation software. Although every effort was made to ensure the accuracy of this automated transcription, some errors in transcription may have occurred.

## 2021-04-28 ENCOUNTER — HOSPITAL ENCOUNTER (OUTPATIENT)
Age: 66
Setting detail: SPECIMEN
Discharge: HOME | End: 2021-04-28
Payer: MEDICARE

## 2021-04-28 DIAGNOSIS — R73.9 HYPERGLYCEMIA: ICD-10-CM

## 2021-04-28 DIAGNOSIS — E78.00 PURE HYPERCHOLESTEROLEMIA: ICD-10-CM

## 2021-04-28 LAB
ALBUMIN SERPL-MCNC: 4.2 G/DL (ref 3.5–5.2)
ALBUMIN/GLOBULIN RATIO: 1.4 (ref 1–2.5)
ALP BLD-CCNC: 66 U/L (ref 40–129)
ALT SERPL-CCNC: 22 U/L (ref 5–41)
ANION GAP SERPL CALCULATED.3IONS-SCNC: 8 MMOL/L (ref 9–17)
AST SERPL-CCNC: 21 U/L
BILIRUB SERPL-MCNC: 0.67 MG/DL (ref 0.3–1.2)
BUN BLDV-MCNC: 12 MG/DL (ref 8–23)
BUN/CREAT BLD: ABNORMAL (ref 9–20)
CALCIUM SERPL-MCNC: 9.7 MG/DL (ref 8.6–10.4)
CHLORIDE BLD-SCNC: 96 MMOL/L (ref 98–107)
CHOLESTEROL/HDL RATIO: 2.5
CHOLESTEROL: 173 MG/DL
CO2: 27 MMOL/L (ref 20–31)
CREAT SERPL-MCNC: 0.92 MG/DL (ref 0.7–1.2)
ESTIMATED AVERAGE GLUCOSE: 94 MG/DL
GFR AFRICAN AMERICAN: >60 ML/MIN
GFR NON-AFRICAN AMERICAN: >60 ML/MIN
GFR SERPL CREATININE-BSD FRML MDRD: ABNORMAL ML/MIN/{1.73_M2}
GFR SERPL CREATININE-BSD FRML MDRD: ABNORMAL ML/MIN/{1.73_M2}
GLUCOSE BLD-MCNC: 100 MG/DL (ref 70–99)
HBA1C MFR BLD: 4.9 % (ref 4–6)
HDLC SERPL-MCNC: 70 MG/DL
LDL CHOLESTEROL: 88 MG/DL (ref 0–130)
POTASSIUM SERPL-SCNC: 4.8 MMOL/L (ref 3.7–5.3)
SODIUM BLD-SCNC: 131 MMOL/L (ref 135–144)
TOTAL PROTEIN: 7.2 G/DL (ref 6.4–8.3)
TRIGL SERPL-MCNC: 73 MG/DL
VLDLC SERPL CALC-MCNC: NORMAL MG/DL (ref 1–30)

## 2021-04-30 PROBLEM — E66.812 CLASS 2 SEVERE OBESITY DUE TO EXCESS CALORIES WITH SERIOUS COMORBIDITY AND BODY MASS INDEX (BMI) OF 38.0 TO 38.9 IN ADULT: Status: ACTIVE | Noted: 2021-04-30

## 2021-04-30 PROBLEM — E66.01 CLASS 2 SEVERE OBESITY DUE TO EXCESS CALORIES WITH SERIOUS COMORBIDITY AND BODY MASS INDEX (BMI) OF 38.0 TO 38.9 IN ADULT (HCC): Status: ACTIVE | Noted: 2021-04-30

## 2021-07-08 ENCOUNTER — HOSPITAL ENCOUNTER (OUTPATIENT)
Age: 66
Setting detail: SPECIMEN
Discharge: HOME | End: 2021-07-08
Payer: MEDICARE

## 2021-07-08 DIAGNOSIS — R31.9 HEMATURIA, UNSPECIFIED TYPE: ICD-10-CM

## 2021-07-08 LAB
-: NORMAL
AMORPHOUS: NORMAL
BACTERIA: NORMAL
BILIRUBIN URINE: NEGATIVE
CASTS UA: NORMAL /LPF (ref 0–8)
COLOR: YELLOW
COMMENT UA: ABNORMAL
CRYSTALS, UA: NORMAL /HPF
EPITHELIAL CELLS UA: NORMAL /HPF (ref 0–5)
GLUCOSE URINE: NEGATIVE
KETONES, URINE: NEGATIVE
LEUKOCYTE ESTERASE, URINE: NEGATIVE
MUCUS: NORMAL
NITRITE, URINE: NEGATIVE
OTHER OBSERVATIONS UA: NORMAL
PH UA: 6.5 (ref 5–8)
PROTEIN UA: NEGATIVE
RBC UA: NORMAL /HPF (ref 0–4)
RENAL EPITHELIAL, UA: NORMAL /HPF
SPECIFIC GRAVITY UA: 1.01 (ref 1–1.03)
TRICHOMONAS: NORMAL
TURBIDITY: CLEAR
URINE HGB: ABNORMAL
UROBILINOGEN, URINE: NORMAL
WBC UA: NORMAL /HPF (ref 0–5)
YEAST: NORMAL

## 2021-07-27 ENCOUNTER — HOSPITAL ENCOUNTER (OUTPATIENT)
Age: 66
Setting detail: SPECIMEN
Discharge: HOME | End: 2021-07-27
Payer: MEDICARE

## 2021-07-27 DIAGNOSIS — R31.9 HEMATURIA, UNSPECIFIED TYPE: ICD-10-CM

## 2021-07-27 LAB
BILIRUBIN URINE: NEGATIVE
COLOR: YELLOW
COMMENT UA: NORMAL
GLUCOSE URINE: NEGATIVE
KETONES, URINE: NEGATIVE
LEUKOCYTE ESTERASE, URINE: NEGATIVE
NITRITE, URINE: NEGATIVE
PH UA: 5.5 (ref 5–8)
PROTEIN UA: NEGATIVE
SPECIFIC GRAVITY UA: 1.01 (ref 1–1.03)
TURBIDITY: CLEAR
URINE HGB: NEGATIVE
UROBILINOGEN, URINE: NORMAL

## 2021-07-27 PROCEDURE — 81003 URINALYSIS AUTO W/O SCOPE: CPT

## 2021-10-18 RX ORDER — TIOTROPIUM BROMIDE INHALATION SPRAY 3.12 UG/1
SPRAY, METERED RESPIRATORY (INHALATION)
Qty: 3 EACH | Refills: 1 | Status: SHIPPED | OUTPATIENT
Start: 2021-10-18 | End: 2022-04-15

## 2021-10-19 ENCOUNTER — OFFICE VISIT (OUTPATIENT)
Dept: PULMONOLOGY | Age: 66
End: 2021-10-19
Payer: MEDICARE

## 2021-10-19 VITALS
HEIGHT: 71 IN | BODY MASS INDEX: 39.2 KG/M2 | HEART RATE: 75 BPM | OXYGEN SATURATION: 97 % | RESPIRATION RATE: 16 BRPM | WEIGHT: 280 LBS | DIASTOLIC BLOOD PRESSURE: 75 MMHG | TEMPERATURE: 98.6 F | SYSTOLIC BLOOD PRESSURE: 163 MMHG

## 2021-10-19 DIAGNOSIS — Z87.891 PERSONAL HISTORY OF TOBACCO USE: ICD-10-CM

## 2021-10-19 DIAGNOSIS — J44.9 CHRONIC OBSTRUCTIVE PULMONARY DISEASE, UNSPECIFIED COPD TYPE (HCC): Primary | ICD-10-CM

## 2021-10-19 DIAGNOSIS — R91.1 LUNG NODULE: ICD-10-CM

## 2021-10-19 DIAGNOSIS — G47.33 OSA (OBSTRUCTIVE SLEEP APNEA): ICD-10-CM

## 2021-10-19 DIAGNOSIS — E66.9 OBESITY (BMI 35.0-39.9 WITHOUT COMORBIDITY): ICD-10-CM

## 2021-10-19 DIAGNOSIS — Z87.891 HISTORY OF SMOKING AT LEAST 1 PACK PER DAY FOR AT LEAST 30 YEARS: ICD-10-CM

## 2021-10-19 PROCEDURE — G0296 VISIT TO DETERM LDCT ELIG: HCPCS | Performed by: INTERNAL MEDICINE

## 2021-10-19 PROCEDURE — 99213 OFFICE O/P EST LOW 20 MIN: CPT | Performed by: INTERNAL MEDICINE

## 2021-10-19 NOTE — PROGRESS NOTES
OUTPATIENT PULMONARY PROGRESS NOTE      Patient:  Luisa Reeder  MRN: [id number]    Consulting Physician: Magnolia Villar  Reason for Consult: COPD  Primacy Care Physician: aMgnolia Villar, APRN - CNP    HISTORY OF PRESENT ILLNESS:   The patient is a 74 y.o. male   He is therefore follow-up of COPD lung nodule, history of chronic sinusitis and likely obstructive sleep apnea    Since he was seen last time he did not have any COPD exacerbation no steroids or antibiotic use and no ER visit. According to patient his shortness of breath has been stable he does get shortness of breath on exertional activities. According to patient sometimes he feels chest tightness on exertional activity and especially when it is more hot and humid. Shortness of breath is usually on exertional activities he is able to do his regular activities without getting shortness of breath. He does not complain of daily cough, denies sputum production and denies change in color of the sputum if any. According to patient he does get cough in the morning after using Spiriva and bring some sputum and then after that he does not have much cough  He does not complain of wheezing denies nocturnal awakening with cough wheezing chest tightness or shortness of breath. He uses Spiriva daily in the morning he does not require albuterol he does not think that he need albuterol. His activities are also limited because of osteoarthritis and according to patient he had gained 70 pounds weight since he retired to 3 years ago and he is thinking of going back to work part-time. He had history of lung nodules and had a CT scan done on 09/30/2020 for low-dose screening. Which showed 6 to 7 mm nodule in the right middle lobe on minor fissure which according to radiology stable he also have left lung nodule on the left major fissure.   On my review the lung nodule in right middle lobe is about the same size possibly slight change in shape but there is a slight groundglass opacity just above the nodule which I was not able to see on CT scan on 09/25/2019. CT scan was ordered for the follow-up of groundglass opacity and was done on 04/05/2021 in the area of groundglass opacity look better other nodules are stable and not much change from 2019. Home sleep study was done in October which showed RDI of 6.2 consistent with mild obstructive sleep apnea. I have discussed with him the results of sleep study on last visit. He denies taking naps during the daytime denies dozing off during the daytime. He thinks that when he wake up in the morning he feels fresh he does have awakening at night denies any other symptoms and he does not think that he need CPAP. I had discussed with him about the option of CPAP titration study versus auto CPAP at home         Initial visit/previous visits summary  He was referred here for evaluation of COPD and he was scheduled for a screening CT scan of the chest and also for sleep study. He was diagnosed with stage II COPD at that time and he was restarted on his Spiriva to spray once daily   He denies daily and persistent chronic cough and since he started using Spiriva despite and he use to have which was brown in color is more lighter and yellowish. Positive post nasal drip and nasal obstruction on R side and on Flonase nasal spray   He never was diagnosed with obstructive sleep apnea but he was told by his wife that he does have snoring he occasionally wake himself with snoring. He denies having any witnessed apnea sometimes wake up with choking sensation. He does have dry mouth when he wake up, but denies any fatigue and tiredness during the daytime and he denies any headache in the morning. He denies unrefreshed sleep he does have history of weight gain. He denies forgetfulness or decreased concentration.         ESS is     Sleep Medicine 4/13/2021 10/6/2020 10/30/2018 1/3/2018   Sitting and reading 0 0 0 0   Watching TV 0 0 0 0 Sitting, inactive in a public place (e.g. a theatre or a meeting) 0 0 0 0   As a passenger in a car for an hour without a break 0 0 0 0   Lying down to rest in the afternoon when circumstances permit 0 0 0 0   Sitting and talking to someone 0 0 0 0   Sitting quietly after a lunch without alcohol 0 0 0 0   In a car, while stopped for a few minutes in traffic 0 0 0 0   Total score 0 0 0 0       Past Medical History:        Diagnosis Date    Allergic rhinitis     Anxiety     Arthritis     Gilliam esophagus     Bleeding ulcer     Colon polyps     COPD [facility]chronic obstructive pulmonary disease) ([facility] [address]     Environmental allergies     GERD (gastroesophageal reflux disease)     Hyperlipidemia     Hyperplastic colon polyp     Hypertension     Lung nodule     has follow up CT scans    Perforated stomach ([facility] [address] 1985    Pure hypercholesterolemia     Tubular adenoma of colon 1/2012     Dr. Tammi Ta colonoscopy       Past Surgical History:        Procedure Laterality Date    APPENDECTOMY      COLONOSCOPY      COLONOSCOPY  01/06/2012    COLONOSCOPY N/A 4/29/2019    COLONOSCOPY POLYPECTOMY SNARE/COLD BIOPSY performed by Abel Loera MD at [id number] NASAL SINUS SURGERY  10/05/2018    STOMACH SURGERY      Perforated Stomach repair    TONSILLECTOMY      UMBILICAL HERNIA REPAIR  7/2011    UPPER GASTROINTESTINAL ENDOSCOPY      UPPER GASTROINTESTINAL ENDOSCOPY N/A 4/29/2019    EGD BIOPSY performed by Abel Loera MD at [id number]       Allergies:     Allergies   Allergen Reactions    Compazine Spansule  [Prochlorperazine]      Other reaction(s): Unknown    Metoclopramide     Prochlorperazine Edisylate      seizure    Reglan  [Metoclopramide Hcl]      Other reaction(s): Unknown    Reglan [Metoclopramide Hcl]      seizure    Seasonal          Home Meds:   Outpatient Encounter Medications as of 10/19/2021   Medication Sig Dispense Refill    SPIRIVA RESPIMAT 2.5 MCG/ACT AERS inhaler USE 2 INHALATIONS DAILY 3 each 1    simvastatin (ZOCOR) 20 MG tablet TAKE 1 TABLET NIGHTLY 90 tablet 3    fluticasone (FLONASE) 50 MCG/ACT nasal spray USE 2 SPRAYS NASALLY DAILY 48 g 3    vitamin D3 (CHOLECALCIFEROL) 25 MCG (1000 UT) TABS tablet Take 1 tablet by mouth daily 90 tablet 3    lansoprazole (PREVACID) 30 MG delayed release capsule TAKE 1 CAPSULE DAILY 90 capsule 3    amLODIPine (NORVASC) 10 MG tablet TAKE 1 TABLET DAILY 90 tablet 3    aspirin 81 MG tablet Take 81 mg by mouth daily.  ALPRAZolam (XANAX) 0.25 MG tablet Take 1 tablet by mouth every 12 hours as needed for Anxiety for up to 30 days. 60 tablet 0     Facility-Administered Encounter Medications as of 10/19/2021   Medication Dose Route Frequency Provider Last Rate Last Admin    triamcinolone acetonide (KENALOG-40) injection 40 mg  40 mg IntraMUSCular Once Reford Second, APRN - CNP           Social History:   TOBACCO:   reports that he quit smoking about 3 years ago. He has a 40.00 pack-year smoking history. 1 PPD, He has never used smokeless tobacco.  ETOH:   reports no history of alcohol use.   OCCUPATION:  Refinery worker operational coordinator, office work    Family History:       Problem Relation Age of Onset    Cancer Mother         thyroid    Thyroid Disease Sister     Heart Attack Paternal Grandfather        Immunizations:    Immunization History   Administered Date(s) Administered    Benja PARKER PF, 30mcg/0.3mL 02/22/2021, 03/15/2021    Influenza Vaccine, unspecified formulation 11/07/2016, 10/30/2018    Influenza Virus Vaccine 10/30/2013, 10/15/2014, 10/16/2015, 10/25/2017, 10/30/2018    Influenza, High Dose (Fluzone 65 yrs and older) 11/05/2018    Influenza, Quadv, adjuvanted, 65 yrs +, IM, PF (Fluad) 10/30/2020    Influenza, Triv, inactivated, subunit, adjuvanted, IM (Fluad 65 yrs and older) 10/17/2019    Pneumococcal Conjugate 13-valent ([id number]) 08/14/2018    Pneumococcal Polysaccharide ([id number]) 12/08/2015, 04/30/2021    Tdap (Boostrix, Adacel) 06/06/2012         REVIEW OF SYSTEMS:  CONSTITUTIONAL:  negative for  fevers, chills, sweats, fatigue, malaise, anorexia and weight loss  EYES:  negative for  double vision, blurred vision, dry eyes, eye discharge, visual disturbance, irritation, redness and icterus  HEENT:  Negative for postnasal dripping and nasal obstruction and positive for decrease hearing and tinnitus sometime, negative ear drainage, nasal congestion, epistaxis, sore mouth, sore throat and hoarseness  RESPIRATORY: Positive for dyspnea on exertion, negative for cough, wheezing, hemoptysis, chest pain, pleuritic pain and cyanosis  CARDIOVASCULAR: Positive for dyspnea on exertion, negative for  chest pain, palpitations, orthopnea, PND, exertional chest pressure/discomfort, fatigue, early saiety, edema, syncope  GASTROINTESTINAL:  Negative for reflux, nausea, vomiting, change in bowel habits, diarrhea, constipation, abdominal pain, abdominal mass, abdominal distention, dysphagia, regurgitation, odynophagia, hematemesis and hemtochezia  GENITOURINARY:  negative for frequency, dysuria, nocturia, urinary incontinence, hesitancy, decreased stream and hematuria  HEMATOLOGIC/LYMPHATIC:  negative for easy bruising, bleeding, lymphadenopathy and petechiae  ALLERGIC/IMMUNOLOGIC:  negative for recurrent infections, urticaria, hay fever, angioedema, anaphylaxis and drug reactions  ENDOCRINE:  negative for heat intolerance, cold intolerance, tremor, change in bowel habits and hair loss  MUSCULOSKELETAL:  negative for  myalgias, arthralgias, joint swelling, stiff joints and decreased range of motion  NEUROLOGICAL:  negative for headaches, dizziness, seizures, memory problems, speech problems, visual disturbance, coordination problems, gait problems, tremor, dysphagia, weakness, numbness, syncope and tingling  BEHAVIOR/PSYCH:  negative          Physical Exam:    Vitals: BP (!) 163/75 (Site: Left Lower Arm, Position: Sitting, Cuff Size: Large Adult)   Pulse 75   Temp 98.6 °F (37 °C) (Temporal)   Resp 16   Ht 5' 11\" (1.803 m)   Wt 280 lb (127 kg)   SpO2 97% Comment: room air  BMI 39.05 kg/m²   Last 3 weights: Wt Readings from Last 3 Encounters:   10/19/21 280 lb (127 kg)   04/30/21 278 lb (126.1 kg)   04/13/21 278 lb (126.1 kg)     Body mass index is 39.05 kg/m². Physical Examination:   General appearance - alert, well appearing, and in no distress, overweight and acyanotic, in no respiratory distress  Mental status - alert, oriented to person, place, and time  Eyes - pupils equal and reactive, extraocular eye movements intact, sclera anicteric  Ears - right ear normal, left ear normal  Nose - bilateral slightly edematous mucosa and no turbinate hypertrophy and nasal obstruction, no erythema, discharge or polyps, large tongue, small oropharynx. Mouth - mucous membranes moist, pharynx normal without lesions. Mallampati 2  Neck - supple, no significant adenopathy, very short and thick neck  Chest -no tachypnea or retraction or cyanosis, bilateral symmetrical chest movement, air entry is bilaterally present slightly distant breath sound no expiratory wheezing rhonchi crackles.    Heart - normal rate, regular rhythm, normal S1, S2, no murmurs, rubs, clicks or gallops  Abdomen - soft, nontender, nondistended, no masses or organomegaly  Neurological - alert, oriented, normal speech, no focal findings or movement disorder noted  Extremities - peripheral pulses normal, no pedal edema, no clubbing or cyanosis  Skin - normal coloration and turgor, no rashes, no suspicious skin lesions noted       LABS:    CBC:   WBC   Date Value Ref Range Status   10/28/2020 6.4 3.5 - 11.3 k/uL Final   10/09/2019 7.5 3.5 - 11.0 k/uL Final   04/17/2019 7.6 3.5 - 11.0 k/uL Final     Hemoglobin   Date Value Ref Range Status   10/28/2020 14.8 13.0 - 17.0 g/dL Final   10/09/2019 15.3 13.5 - 17.5 g/dL Final   04/17/2019 14.9 13.5 - 17.5 g/dL Final     Platelet Count   Date Value Ref Range Status   12/05/2011 302 140 - 450 k/uL Final     Platelets   Date Value Ref Range Status   10/28/2020 275 138 - 453 k/uL Final   10/09/2019 263 150 - 450 k/uL Final   04/17/2019 290 150 - 450 k/uL Final     BMP:   Sodium   Date Value Ref Range Status   04/28/2021 131 (L) 135 - 144 mmol/L Final   10/28/2020 139 135 - 144 mmol/L Final   04/13/2020 137 135 - 144 mmol/L Final     Potassium   Date Value Ref Range Status   04/28/2021 4.8 3.7 - 5.3 mmol/L Final   10/28/2020 4.8 3.7 - 5.3 mmol/L Final   04/13/2020 4.4 3.7 - 5.3 mmol/L Final     Chloride   Date Value Ref Range Status   04/28/2021 96 (L) 98 - 107 mmol/L Final   10/28/2020 100 98 - 107 mmol/L Final   04/13/2020 97 (L) 98 - 107 mmol/L Final     CO2   Date Value Ref Range Status   04/28/2021 27 20 - 31 mmol/L Final   10/28/2020 23 20 - 31 mmol/L Final   04/13/2020 25 20 - 31 mmol/L Final     BUN   Date Value Ref Range Status   04/28/2021 12 8 - 23 mg/dL Final   10/28/2020 15 8 - 23 mg/dL Final   04/13/2020 12 8 - 23 mg/dL Final     CREATININE   Date Value Ref Range Status   04/28/2021 0.92 0.70 - 1.20 mg/dL Final   10/28/2020 0.86 0.70 - 1.20 mg/dL Final   04/13/2020 0.96 0.70 - 1.20 mg/dL Final     Glucose   Date Value Ref Range Status   04/28/2021 100 (H) 70 - 99 mg/dL Final   10/28/2020 96 70 - 99 mg/dL Final   04/13/2020 111 (H) 70 - 99 mg/dL Final   01/23/2012 93 74 - 106 mg/dL Final   12/05/2011 101 74 - 106 mg/dL Final     Hepatic:     Amylase: No results found for: AMYLASE  Lipase: No results found for: LIPASE  CARDIAC ENZYMES: No results found for: CKTOTAL, CKMB, CKMBINDEX, TROPONINI  BNP: No results found for: BNP  Lipids:       INR: No results found for: INR  Thyroid: No results found for: TSH  Urinalysis:     Cultures:-  -----------------------------------------------------------------    Immunization History   Administered Date(s) Administered    COVID-19, Pfizer, PF, 30mcg/0.3mL 02/22/2021, 03/15/2021    Influenza Vaccine, unspecified formulation 11/07/2016, 10/30/2018    Influenza Virus Vaccine 10/30/2013, 10/15/2014, 10/16/2015, 10/25/2017, 10/30/2018    Influenza, High Dose (Fluzone 65 yrs and older) 11/05/2018    Influenza, Donnella Danial, adjuvanted, 65 yrs +, IM, PF (Fluad) 10/30/2020    Influenza, Triv, inactivated, subunit, adjuvanted, IM (Fluad 65 yrs and older) 10/17/2019    Pneumococcal Conjugate 13-valent ([id number]) 08/14/2018    Pneumococcal Polysaccharide ([id number]) 12/08/2015, 04/30/2021    Tdap (Boostrix, Adacel) 06/06/2012     ABGs: No results found for: PHART, PO2ART, YDH2BED    Pulmonary Functions Testing Results:    1/3/2017: FEV1 2.07 52%, FVC 3.47 70%, SHE5GVX 74%, TLC 7.4 102%, DLCO 25.25  94%    CXR 9/22/2017  The cardiomediastinal silhouette is unremarkable.  Aortic vascular   calcification.  The lungs are clear.  No infiltrate, pleural fluid or   failure.  Healed left-sided rib fractures.  No acute osseous findings         CT Scans     09/30/2020.  1. Mild emphysema.  Stable 6 mm right minor fissure and left major fissure    pulmonary nodules.  Mild biapical pleuroparenchymal scarring. 2. Coronary artery disease. 3. Probable pulmonary arterial hypertension. 4. Atherosclerotic calcification of the aorta. 9/25/19  1. Stable multiple pulmonary nodules as detailed above measuring up to 6 mm,   unchanged from 09/24/2018.  Mild emphysema.  Biapical pleuroparenchymal   scarring.  No acute focal airspace consolidation. 2. Mild cardiomegaly.  Coronary artery disease.  Atherosclerotic   calcification of the aorta.       09/24/18  1. Stable appearance of benign-appearing pleural-based lung nodules.  Given   their stability and association with the pleura, findings likely reflect   benign etiologies such as intrapulmonary lymph nodes.  No further follow-up   is suggested. 2. No new or suspicious lung nodules and no lymphadenopathy. 03/26/018  Nonspecific 7-8 mm pleural-based nodular density posterior right upper   lobe/apex on series 2, image 66.  5 mm nodule in the superior right middle   lobe along the fissure as well as 5 mm nodule in the superior left lower lobe   along the fissure both of which likely represent benign intrapulmonary lymph   nodes. EKG:   ECHO:   Stress Test:     Assessment and Plan       ICD-10-CM    1. Chronic obstructive pulmonary disease, unspecified COPD type ([facility] [address]  J44.9    2. DEIRDRE (obstructive sleep apnea)  G47.33    3. Obesity (BMI 35.0-39.9 without comorbidity)  E66.9    4. Lung nodule  R91.1    5. History of smoking at least 1 pack per day for at least 30 years  Z87.891        Patient Active Problem List   Diagnosis    Allergic rhinitis    Gilliam esophagus    GERD (gastroesophageal reflux disease)    History of smoking    Essential hypertension    Pure hypercholesterolemia     CT scan of the chest with the patient, he has a 7 mm pleural-based nodular thickening which was reported to be 6-7 mm by radiology and also there is a 6 mm right middle lobe nodule, both are stable as compared to CT scan of the chest from April 2018 to sep 2018 and sep 2019. He will need yearly screening CT scan because of history of smoking. Plan and recommendations    Discussed with patient about follow-up with primary care physician to consider cardiac work-up. CT scan of the chest in April 2021 shows stable lung nodules for almost 2 years and will go back to low-dose annual screening CT scan and next one would be in April 2022.   Requested and discussed with patient  Continue Spiriva Respimat 2 puff once daily   Albuterol to be used as needed  Vaccinations recommended  and had Pneumonia vaccine 3 years ago  He is up-to-date on pneumonia vaccine  Annual Flu vaccine and he want to get flu vaccine from his PCP next week (per patient)  He had covid vaccine this year and will need covid booster    Home sleep study effectiveness     Risks associated with radiation from annual LDCT- Radiation exposure is about the same as for a mammogram, which is about 1/3 of the annual background radiation exposure from everyday life. Starting screening at age 54 is not likely to increase cancer risk from radiation exposure. Patients with comorbidities resulting in life expectancy of < 10 years, or that would preclude treatment of an abnormality identified on CT, should not be screened due to lack of benefit.     To obtain maximal benefit from this screening, smoking cessation and long-term abstinence from smoking is critical

## 2021-11-04 ENCOUNTER — HOSPITAL ENCOUNTER (OUTPATIENT)
Age: 66
Setting detail: SPECIMEN
Discharge: HOME | End: 2021-11-04
Payer: MEDICARE

## 2021-11-04 DIAGNOSIS — E78.00 PURE HYPERCHOLESTEROLEMIA: ICD-10-CM

## 2021-11-04 DIAGNOSIS — Z12.5 ENCOUNTER FOR SCREENING FOR MALIGNANT NEOPLASM OF PROSTATE: ICD-10-CM

## 2021-11-04 DIAGNOSIS — R73.9 HYPERGLYCEMIA: ICD-10-CM

## 2021-11-04 DIAGNOSIS — I10 ESSENTIAL HYPERTENSION: ICD-10-CM

## 2021-11-04 LAB
ALBUMIN SERPL-MCNC: 4.3 G/DL (ref 3.5–5.2)
ALBUMIN/GLOBULIN RATIO: 1.3 (ref 1–2.5)
ALP BLD-CCNC: 76 U/L (ref 40–129)
ALT SERPL-CCNC: 24 U/L (ref 5–41)
ANION GAP SERPL CALCULATED.3IONS-SCNC: 12 MMOL/L (ref 9–17)
AST SERPL-CCNC: 20 U/L
BILIRUB SERPL-MCNC: 0.61 MG/DL (ref 0.3–1.2)
BUN BLDV-MCNC: 13 MG/DL (ref 8–23)
BUN/CREAT BLD: NORMAL (ref 9–20)
CALCIUM SERPL-MCNC: 9.6 MG/DL (ref 8.6–10.4)
CHLORIDE BLD-SCNC: 101 MMOL/L (ref 98–107)
CHOLESTEROL/HDL RATIO: 3
CHOLESTEROL: 178 MG/DL
CO2: 25 MMOL/L (ref 20–31)
CREAT SERPL-MCNC: 1.01 MG/DL (ref 0.7–1.2)
ESTIMATED AVERAGE GLUCOSE: 97 MG/DL
GFR AFRICAN AMERICAN: >60 ML/MIN
GFR NON-AFRICAN AMERICAN: >60 ML/MIN
GFR SERPL CREATININE-BSD FRML MDRD: NORMAL ML/MIN/{1.73_M2}
GFR SERPL CREATININE-BSD FRML MDRD: NORMAL ML/MIN/{1.73_M2}
GLUCOSE BLD-MCNC: 97 MG/DL (ref 70–99)
HBA1C MFR BLD: 5 % (ref 4–6)
HCT VFR BLD CALC: 46 % (ref 40.7–50.3)
HDLC SERPL-MCNC: 59 MG/DL
HEMOGLOBIN: 15.2 G/DL (ref 13–17)
LDL CHOLESTEROL: 98 MG/DL (ref 0–130)
MCH RBC QN AUTO: 31.1 PG (ref 25.2–33.5)
MCHC RBC AUTO-ENTMCNC: 33 G/DL (ref 28.4–34.8)
MCV RBC AUTO: 94.1 FL (ref 82.6–102.9)
NRBC AUTOMATED: 0 PER 100 WBC
PDW BLD-RTO: 12 % (ref 11.8–14.4)
PLATELET # BLD: 411 K/UL (ref 138–453)
PMV BLD AUTO: 9.2 FL (ref 8.1–13.5)
POTASSIUM SERPL-SCNC: 4.9 MMOL/L (ref 3.7–5.3)
PROSTATE SPECIFIC ANTIGEN: 1.76 UG/L
RBC # BLD: 4.89 M/UL (ref 4.21–5.77)
SODIUM BLD-SCNC: 138 MMOL/L (ref 135–144)
TOTAL PROTEIN: 7.5 G/DL (ref 6.4–8.3)
TRIGL SERPL-MCNC: 104 MG/DL
VLDLC SERPL CALC-MCNC: NORMAL MG/DL (ref 1–30)
WBC # BLD: 7 K/UL (ref 3.5–11.3)

## 2022-01-25 ENCOUNTER — HOSPITAL ENCOUNTER (OUTPATIENT)
Dept: MRI IMAGING | Age: 67
Discharge: HOME | End: 2022-01-27
Payer: MEDICARE

## 2022-01-25 DIAGNOSIS — M25.561 CHRONIC PAIN OF RIGHT KNEE: ICD-10-CM

## 2022-01-25 DIAGNOSIS — G89.29 CHRONIC PAIN OF RIGHT KNEE: ICD-10-CM

## 2022-01-25 PROCEDURE — 73721 MRI JNT OF LWR EXTRE W/O DYE: CPT

## 2022-02-21 ENCOUNTER — OFFICE VISIT (OUTPATIENT)
Dept: ORTHOPEDIC SURGERY | Age: 67
End: 2022-02-21

## 2022-02-21 DIAGNOSIS — M25.561 CHRONIC PAIN OF RIGHT KNEE: Primary | ICD-10-CM

## 2022-02-21 DIAGNOSIS — G89.29 CHRONIC PAIN OF RIGHT KNEE: Primary | ICD-10-CM

## 2022-02-21 PROCEDURE — 99203 OFFICE O/P NEW LOW 30 MIN: CPT | Performed by: ORTHOPAEDIC SURGERY

## 2022-02-21 NOTE — PROGRESS NOTES
Jacinto Shell M.D.            [address] [facility],[address], [address] [facility]           Dept Phone: [phone]           Dept Fax:  [phone]           oSumya Navarrete          Dept Phone: [phone]           Dept Fax:  [phone]      Chief Compliant:  Chief Complaint   Patient presents with    Pain     Rt knee        History of Present Illness: This is a 74 y.o. male who presents to the clinic today for evaluation / follow up of right knee pain. Patient is a 49-year-old gentleman who is complaining of severe sharp stabbing pain especially on the outside of his right knee. Does not recall any specific one incident where this started but he states that he is a hard time getting around. If he accidentally kicked something he feels like it still get a give out on him. He has states that it swollen all the time. .       Review of Systems   Constitutional: Negative for fever, chills, sweats. Eyes: Negative for changes in vision, or pain. HENT: Negative for ear ache, epistaxis, or sore throat. Respiratory/Cardio: Negative for Chest pain, palpitations, SOB, or cough. Gastrointestinal: Negative for abdominal pain, N/V/D. Genitourinary: Negative for dysuria, frequency, urgency, or hematuria. Neurological: Negative for headache, numbness, or weakness. Integumentary: Negative for rash, itching, laceration, or abrasion. Musculoskeletal: Positive for Pain (Rt knee)       Physical Exam:  Constitutional: Patient is oriented to person, place, and time. Patient appears well-developed and well nourished. HENT: Negative otherwise noted  Head: Normocephalic and Atraumatic  Nose: Normal  Eyes: Conjunctivae and EOM are normal  Neck: Normal range of motion Neck supple. Respiratory/Cardio: Effort normal. No respiratory distress.   Musculoskeletal: Examination of his right knee notes a slight effusion. He has motion of 0 to 100 degrees has pain after that is a very positive Fischer's laterally. Medially is okay ligamentously grossly intact regards to his cruciates and collaterals. Minimal patellofemoral pain no calf tenderness negative Homans no hip rotational pain no trochanteric findings    Neurological: Patient is alert and oriented to person, place, and time. Normal strenght. No sensory deficit. Skin: Skin is warm and dry  Psychiatric: Behavior is normal. Thought content normal.  Nursing note and vitals reviewed. Labs and Imaging:     XR taken today:  XR KNEE RIGHT (1-2 VIEWS)    Result Date: 2/21/2022  X-rays taken a reviewed by me show standing AP both knees allow the right knee. Patient has moderate lateral joint space narrowing the right knee and some early spur formation as well. Medial compartment is relatively minimally narrowed. He does have a slight effusion present as visualized on the lateral.  No other acute process is noted. Patient's left knee is relatively unremarkable     MRI dated 1/25/2022 shows a complex tear of the lateral meniscus with a parameniscal cyst.  There is also some lateral degenerative joint disease and a rather large effusion and Baker's cyst.     Orders Placed This Encounter   Procedures    XR KNEE RIGHT (1-2 VIEWS)     Standing Status:   Future     Number of Occurrences:   1     Standing Expiration Date:   2/17/2023       Assessment and Plan:  1. Chronic pain of right knee    2. 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 male who presents to the clinic today for evaluation / follow up of lateral meniscus tear right knee.      Past History:    Current Outpatient Medications:     predniSONE (DELTASONE) 10 MG tablet, Sig: take three tablets by mouth together daily for 3 days, then two tablets daily for three days, then one daily for three days, Disp: 18 tablet, Rfl: 0   lansoprazole (PREVACID) 30 MG delayed release capsule, TAKE 1 CAPSULE DAILY, Disp: 90 capsule, Rfl: 3    amLODIPine (NORVASC) 10 MG tablet, Take 1 tablet by mouth daily, Disp: 90 tablet, Rfl: 3    SPIRIVA RESPIMAT 2.5 MCG/ACT AERS inhaler, USE 2 INHALATIONS DAILY, Disp: 3 each, Rfl: 1    simvastatin (ZOCOR) 20 MG tablet, TAKE 1 TABLET NIGHTLY, Disp: 90 tablet, Rfl: 3    fluticasone (FLONASE) 50 MCG/ACT nasal spray, USE 2 SPRAYS NASALLY DAILY, Disp: 48 g, Rfl: 3    vitamin D3 (CHOLECALCIFEROL) 25 MCG (1000 UT) TABS tablet, Take 1 tablet by mouth daily, Disp: 90 tablet, Rfl: 3    aspirin 81 MG tablet, Take 81 mg by mouth daily.   , Disp: , Rfl:   Allergies   Allergen Reactions    Compazine Spansule  [Prochlorperazine]      Other reaction(s): Unknown    Metoclopramide     Prochlorperazine Edisylate      seizure    Reglan  [Metoclopramide Hcl]      Other reaction(s): Unknown    Reglan [Metoclopramide Hcl]      seizure    Seasonal      Social History     Socioeconomic History    Marital status:      Spouse name: Not on file    Number of children: Not on file    Years of education: Not on file    Highest education level: Not on file   Occupational History    Not on file   Tobacco Use    Smoking status: Former Smoker     Packs/day: 1.00     Years: 30.00     Pack years: 30.00     Start date:      Quit date: 2014     Years since quittin.1    Smokeless tobacco: Never Used   Vaping Use    Vaping Use: Never used   Substance and Sexual Activity    Alcohol use: Never    Drug use: No    Sexual activity: Not on file   Other Topics Concern    Not on file   Social History Narrative    Not on file     Social Determinants of Health     Financial Resource Strain: Low Risk     Difficulty of Paying Living Expenses: Not hard at all   Food Insecurity: No Food Insecurity    Worried About Running Out of Food in the Last Year: Never true    Tessy of Food in the Last Year: Never true Transportation Needs:     Lack of Transportation (Medical): Not on file    Lack of Transportation (Non-Medical):  Not on file   Physical Activity:     Days of Exercise per Week: Not on file    Minutes of Exercise per Session: Not on file   Stress:     Feeling of Stress : Not on file   Social Connections:     Frequency of Communication with Friends and Family: Not on file    Frequency of Social Gatherings with Friends and Family: Not on file    Attends Taoist Services: Not on file    Active Member of [address] [place] [address] or Organizations: Not on file    Attends Club or Organization Meetings: Not on file    Marital Status: Not on file   Intimate Partner Violence:     Fear of Current or Ex-Partner: Not on file    Emotionally Abused: Not on file    Physically Abused: Not on file    Sexually Abused: Not on file   Housing Stability:     Unable to Pay for Housing in the Last Year: Not on file    Number of [place] in the Last Year: Not on file    Unstable Housing in the Last Year: Not on file     Past Medical History:   Diagnosis Date    Allergic rhinitis     Anxiety     Arthritis     Gilliam esophagus     Bleeding ulcer     Colon polyps     [facility]     Environmental allergies     GERD (gastroesophageal reflux disease)     Hyperlipidemia     Hyperplastic colon polyp     Hypertension     Lung nodule     has follow up CT scans    Perforated stomach ([facility] 1985    Pure hypercholesterolemia     Tubular adenoma of colon 1/2012     Dr. Benton Zheng colonoscopy     Past Surgical History:   Procedure Laterality Date    APPENDECTOMY      COLONOSCOPY      COLONOSCOPY  01/06/2012    COLONOSCOPY N/A 4/29/2019    COLONOSCOPY POLYPECTOMY SNARE/COLD BIOPSY performed by Shu Jules MD at [address]  10/05/2018    STOMACH SURGERY      Perforated Stomach repair    TONSILLECTOMY      UMBILICAL HERNIA REPAIR  7/2011    UPPER GASTROINTESTINAL ENDOSCOPY      UPPER GASTROINTESTINAL ENDOSCOPY N/A 4/29/2019    EGD BIOPSY performed by Alivia Medellin MD at [facility] History   Problem Relation Age of Onset    Cancer Mother         thyroid    Thyroid Disease Sister     Heart Attack Paternal Grandfather    Plan  Discussed with the patient that he would benefit from arthroscopic evaluation treatment. We discussed the details procedure as well as the risk and benefits. He does wish to proceed we will get him scheduled accordingly      Provider Attestation:  Morenita Montana, personally performed the services described in this documentation. All medical record entries made by the scribe were at my direction and in my presence. I have reviewed the chart and discharge instructions and agree that the records reflect my personal performance and is accurate and complete. Anuel Faust MD. 02/21/22      Please note that this chart was generated using voice recognition Dragon dictation software. Although every effort was made to ensure the accuracy of this automated transcription, some errors in transcription may have occurred.

## 2022-03-24 ENCOUNTER — HOSPITAL ENCOUNTER (OUTPATIENT)
Dept: PREADMISSION TESTING | Age: 67
Discharge: HOME | End: 2022-03-28
Payer: MEDICARE

## 2022-03-24 VITALS
HEIGHT: 71 IN | SYSTOLIC BLOOD PRESSURE: 168 MMHG | HEART RATE: 83 BPM | WEIGHT: 270 LBS | OXYGEN SATURATION: 99 % | DIASTOLIC BLOOD PRESSURE: 76 MMHG | TEMPERATURE: 98.1 F | BODY MASS INDEX: 37.8 KG/M2 | RESPIRATION RATE: 18 BRPM

## 2022-03-24 DIAGNOSIS — Z01.818 PREOP EXAMINATION: ICD-10-CM

## 2022-03-24 LAB
ABSOLUTE EOS #: 0.1 K/UL (ref 0–0.4)
ABSOLUTE LYMPH #: 1.6 K/UL (ref 1–4.8)
ABSOLUTE MONO #: 0.6 K/UL (ref 0.1–1.3)
ANION GAP SERPL CALCULATED.3IONS-SCNC: 11 MMOL/L (ref 9–17)
BASOPHILS # BLD: 1 % (ref 0–2)
BASOPHILS ABSOLUTE: 0.1 K/UL (ref 0–0.2)
BUN BLDV-MCNC: 15 MG/DL (ref 8–23)
CALCIUM SERPL-MCNC: 9.5 MG/DL (ref 8.6–10.4)
CHLORIDE BLD-SCNC: 101 MMOL/L (ref 98–107)
CO2: 27 MMOL/L (ref 20–31)
CREAT SERPL-MCNC: 0.99 MG/DL (ref 0.7–1.2)
EOSINOPHILS RELATIVE PERCENT: 2 % (ref 0–4)
GFR AFRICAN AMERICAN: >60 ML/MIN
GFR NON-AFRICAN AMERICAN: >60 ML/MIN
GFR SERPL CREATININE-BSD FRML MDRD: ABNORMAL ML/MIN/{1.73_M2}
GLUCOSE BLD-MCNC: 104 MG/DL (ref 70–99)
HCT VFR BLD CALC: 44.7 % (ref 41–53)
HEMOGLOBIN: 15.1 G/DL (ref 13.5–17.5)
LYMPHOCYTES # BLD: 21 % (ref 24–44)
MCH RBC QN AUTO: 31.3 PG (ref 26–34)
MCHC RBC AUTO-ENTMCNC: 33.8 G/DL (ref 31–37)
MCV RBC AUTO: 92.6 FL (ref 80–100)
MONOCYTES # BLD: 8 % (ref 1–7)
PDW BLD-RTO: 12.8 % (ref 11.5–14.9)
PLATELET # BLD: 294 K/UL (ref 150–450)
PMV BLD AUTO: 7.6 FL (ref 6–12)
POTASSIUM SERPL-SCNC: 5 MMOL/L (ref 3.7–5.3)
RBC # BLD: 4.83 M/UL (ref 4.5–5.9)
SEG NEUTROPHILS: 68 % (ref 36–66)
SEGMENTED NEUTROPHILS ABSOLUTE COUNT: 5.6 K/UL (ref 1.3–9.1)
SODIUM BLD-SCNC: 139 MMOL/L (ref 135–144)
WBC # BLD: 8 K/UL (ref 3.5–11)

## 2022-03-24 PROCEDURE — 80048 BASIC METABOLIC PNL TOTAL CA: CPT

## 2022-03-24 PROCEDURE — 36415 COLL VENOUS BLD VENIPUNCTURE: CPT

## 2022-03-24 PROCEDURE — 93005 ELECTROCARDIOGRAM TRACING: CPT | Performed by: NURSE PRACTITIONER

## 2022-03-24 PROCEDURE — 99214 OFFICE O/P EST MOD 30 MIN: CPT | Performed by: NURSE PRACTITIONER

## 2022-03-24 PROCEDURE — 85025 COMPLETE CBC W/AUTO DIFF WBC: CPT

## 2022-03-24 RX ORDER — AMLODIPINE BESYLATE 10 MG/1
10 TABLET ORAL NIGHTLY
COMMUNITY

## 2022-03-24 ASSESSMENT — ENCOUNTER SYMPTOMS
WHEEZING: 1
VOMITING: 0
BLOOD IN STOOL: 0
NAUSEA: 0
SHORTNESS OF BREATH: 1
RHINORRHEA: 0
CHEST TIGHTNESS: 1
COUGH: 0
ABDOMINAL PAIN: 0
CONSTIPATION: 0
APNEA: 1
SORE THROAT: 0
DIARRHEA: 0
TROUBLE SWALLOWING: 0
ANAL BLEEDING: 0

## 2022-03-24 NOTE — H&P
HISTORY and [facility] [address] [id number]       NAME:  Gissel Mace  MRN: [id number]   YOB: 1955   Date: 3/24/2022   Age: 74 y.o. Gender: male     COMPLAINT AND PRESENT HISTORY:   Gissel Mace is 74 y.o.,  male, presents for pre-anesthesia/admission testing for KNEE ARTHROSCOPIC PARTIAL LATERAL MENISCECTOMY- RIGHT per Dr. Baires Roles. Primary dx: RIGHT KNEE LATERAL MENISCUS.    HPI:  Knee Pain   Incident onset: Patient states that in December 2021, kicked a block, had chronic issues with right knee, but after this knee pain increased- admits to weight gain. The incident occurred at home. The pain is present in the right knee. The quality of the pain is described as aching (Dull, ache). The pain is at a severity of 5/10. The pain has been fluctuating (Worse laying down at night) since onset. Associated symptoms include an inability to bear weight. Pertinent negatives include no loss of motion, loss of sensation, numbness or tingling. Associated symptoms comments: States pain was more severe for 6 weeks, had right hand issue- tx w/prednisone- then noted knee felt better- nephew is a doctor and thought was he had gout to right hand (not officially dx'd)- states he used a tart cherry supplement- takes 1/day. The symptoms are aggravated by movement and weight bearing. He has tried immobilization (Chiropractor) for the symptoms.      Testing completed r/t condition:  X-RAY RIGHT KNEE, 2-21-22:  Narrative   X-rays taken a reviewed by me show standing AP both knees allow the right    knee.  Patient has moderate lateral joint space narrowing the right knee    and some early spur formation as well.  Medial compartment is relatively    minimally narrowed.  He does have a slight effusion present as visualized    on the lateral.  No other acute process is noted.  Patient's left knee is    relatively unremarkable     Narrative   EXAMINATION:   MRI OF THE RIGHT KNEE WITHOUT CONTRAST, 1/25/2022 6:44 am     TECHNIQUE:   Multiplanar multisequence MRI of the right knee was performed without the   administration of intravenous contrast.       COMPARISON:   X-ray dated 02/06/2019.       HISTORY:   ORDERING SYSTEM PROVIDED HISTORY: Chronic pain of right knee   TECHNOLOGIST PROVIDED HISTORY:   Chronic pain of right knee   Reason for Exam: Chronic pain of right knee. Patient states he twisted his   right knee. Pain in the knee cap area.       FINDINGS:   MENISCI: There is complex signal abnormality throughout the lateral meniscus. There is a curvilinear parameniscal cyst along the lateral meniscal body,   measuring 0.7 x 0.8 x 3.7 cm.  Medial meniscus is intact.       CRUCIATE LIGAMENTS: Anterior cruciate and posterior cruciate ligaments are   intact.       EXTENSOR MECHANISM: Extensor mechanism is intact.       LATERAL COLLATERAL LIGAMENT COMPLEX: Distal iliotibial band, lateral   collateral ligament, and distal biceps femoris tendon are intact.  Proximal   popliteus tendon is intact.       MEDIAL COLLATERAL LIGAMENT COMPLEX: Medial collateral ligament is intact.       KNEE JOINT: There is a large joint effusion. Lanette Plate is a large complex   Baker's cyst, measuring up to 6.4 cm in craniocaudal dimension.  Visualized   muscle signal is within normal limits.  There is moderate chondral thinning   in the patellofemoral compartment, most significant along the lateral   patellar facet.  There is moderate chondral thinning and fissuring in the   lateral compartment.  Mild chondral thinning in the medial compartment.    There is mild tricompartmental osteophytic spurring.  There is moderate   ventral subcutaneous edema.       BONE MARROW: Bone marrow signal is within normal limits.           Impression   1.  Complex tear of the lateral meniscus with parameniscal cyst along the   lateral meniscal body.       2.  Tricompartmental osteoarthritis, most significant in the lateral and   patellofemoral compartments.       3. Luis Rimes joint effusion and large Baker's cyst.     Review of additional significant medical hx:  DHILLON ESOPHAGUS, BLEEDING ULCER, GERD: Patient states duodenal bleeding ulcer in the past, also had \"perforated stomach\" in 1985. Denies current ABD pain. States last issue w/bleeding was in 2000. States hx of H. Pylori. Denies dysphagia, pharyngitis, voice change. Current medications r/t condition: PREVACID    COPD: Denies current SOB (admits to SOBOE), + wheezing in the AM, denies cough. Former smoker. Follows w/Dr. Sameera Cuellar. States he was tested for sleep apnea- does not have a machine, dx'd with \"mild sleep apnea\". Current medications r/t condition: SPIRIVA    HTN, HLD, HX OF CHEST TIGHTNESS: Patient takes BP medication at night. He states typically SBP is <150. Denies current/recent chest pain, palpitations (hx of \"fluttering\"- none currently- denies hx of cardiac murmur), SOB, dizziness, + leg swelling (+ b/l LE, ankle swelling- chronic), headache (states rare). States chest tightness after eating at times- stress test ordered, but not yet completed. Current medications r/t condition: AMLODIPINE, SIMVASTATIN, ASA  BP Readings from Last 3 Encounters:   03/24/22 (S) (!) 168/76   11/05/21 136/80   10/19/21 (!) 163/75     Denies hx of MRSA infection. Denies hx of blood clots. Denies hx of any personal or family hx of complications w/anesthesia.    PAST MEDICAL HISTORY     Past Medical History:   Diagnosis Date    Allergic rhinitis     Anxiety     Arthritis     Dhillon esophagus     Basal cell carcinoma of left upper arm 02/09/2018    Bleeding ulcer     Also states hx of \"perforated stomach\" in 1985    Chest tightness     Chronic pain of right knee     Colon polyps     COPD (chronic obstructive pulmonary disease) (HCC)     Environmental allergies     GERD (gastroesophageal reflux disease)     H. pylori infection     History of blood transfusion     Patient denies any issues with this    Hyperlipidemia     Hyperplastic colon polyp     Hypertension     Lung nodule     has follow up CT scans    Mild sleep apnea     Palpitations     Perforated stomach ([address] 1985    Pure hypercholesterolemia     Tubular adenoma of colon 2012     Dr. Reji Vela colonoscopy    Wears partial dentures     Bottom       SURGICAL HISTORY       Past Surgical History:   Procedure Laterality Date    APPENDECTOMY      COLONOSCOPY      COLONOSCOPY  2012    COLONOSCOPY N/A 2019    COLONOSCOPY POLYPECTOMY SNARE/COLD BIOPSY performed by Cece Green MD at [address] [place] [address]  10/05/2018    SKIN CANCER EXCISION      STOMACH SURGERY      Perforated Stomach repair    TONSILLECTOMY      UMBILICAL HERNIA REPAIR  2011    UPPER GASTROINTESTINAL ENDOSCOPY      UPPER GASTROINTESTINAL ENDOSCOPY N/A 2019    EGD BIOPSY performed [facility] Green [facility] at [facility] History     Socioeconomic History    Marital status:      Spouse name: None    Number of children: None    Years of education: None    Highest education level: None   Occupational History    None   Tobacco Use    Smoking status: Former Smoker     Packs/day: 1.00     Years: 30.00     Pack years: 30.00     Start date:      Quit date: 2014     Years since quittin.2    Smokeless tobacco: Never Used   Vaping Use    Vaping Use: Never used   Substance and Sexual Activity    Alcohol use:  Yes     Alcohol/week: 6.0 standard drinks     Types: 6 Cans of beer per week     Comment: 6 cans beer/day    Drug use: No    Sexual activity: None   Other Topics Concern    None   Social History Narrative    None     Social Determinants of Health     Financial Resource Strain: Low Risk     Difficulty of Paying Living Expenses: Not hard at all   Food Insecurity: No Food Insecurity    Worried About Running Out of Food in the Last Year: Never true    Tessy of Food in the Last Year: Never true Transportation Needs:     Lack of Transportation (Medical): Not on file    Lack of Transportation (Non-Medical):  Not on file   Physical Activity:     Days of Exercise per Week: Not on file    Minutes of Exercise per Session: Not on file   Stress:     Feeling of Stress : Not on file   Social Connections:     Frequency of Communication with Friends and Family: Not on file    Frequency of Social Gatherings with Friends and Family: Not on file    Attends Religion Services: Not on file    Active Member of [address] [place] [address] or Organizations: Not on file    Attends Club or Organization Meetings: Not on file    Marital Status: Not on file   Intimate Partner Violence:     Fear of Current or Ex-Partner: Not on file    Emotionally Abused: Not on file    Physically Abused: Not on file    Sexually Abused: Not on file   Housing Stability:     Unable to Pay for Housing in the Last Year: Not on file    Number of [place] in the Last Year: Not on file    Unstable Housing in the Last Year: Not on file       REVIEW OF SYSTEMS      Allergies   Allergen Reactions    Compazine Spansule  [Prochlorperazine]      Other reaction(s): Unknown    Metoclopramide     Prochlorperazine Edisylate      seizure    Reglan  [Metoclopramide Hcl]      Other reaction(s): Unknown    Reglan [Metoclopramide Hcl]      seizure    Seasonal        Current Outpatient Medications on File Prior to Encounter   Medication Sig Dispense Refill    amLODIPine (NORVASC) 10 MG tablet Take 10 mg by mouth at bedtime       TART CHERRY PO Take 1 capsule by mouth daily      lansoprazole (PREVACID) 30 MG delayed release capsule TAKE 1 CAPSULE DAILY 90 capsule 3    SPIRIVA RESPIMAT 2.5 MCG/ACT AERS inhaler USE 2 INHALATIONS DAILY 3 each 1    simvastatin (ZOCOR) 20 MG tablet TAKE 1 TABLET NIGHTLY 90 tablet 3    fluticasone (FLONASE) 50 MCG/ACT nasal spray USE 2 SPRAYS NASALLY DAILY 48 g 3    vitamin D3 (CHOLECALCIFEROL) 25 MCG (1000 UT) TABS tablet Take 1 tablet by mouth daily 90 tablet 3    aspirin 81 MG tablet Take 81 mg by mouth daily. Current Facility-Administered Medications on File Prior to Encounter   Medication Dose Route Frequency Provider Last Rate Last Admin    triamcinolone acetonide (KENALOG-40) injection 40 mg  40 mg IntraMUSCular Once Hanane OrtizjacobVIKY - CNP           Review of Systems   Constitutional: Negative for chills and fever. HENT: Negative for congestion, ear pain, rhinorrhea, sore throat and trouble swallowing. Respiratory: Positive for apnea (Hx of mild sleep apnea), chest tightness (States chest tightness after eating at times, denies currently- intermittent issue), shortness of breath (W/exertion) and wheezing (In the AM). Negative for cough. Cardiovascular: Positive for palpitations (Intermittent \"fluttering\"- none currently) and leg swelling (B/l ankles, LE's, states RLE worse than LLE (chronic)). Negative for chest pain. Gastrointestinal: Negative for abdominal pain, anal bleeding, blood in stool, constipation, diarrhea, nausea and vomiting. Genitourinary: Negative for dysuria and frequency. Musculoskeletal: Positive for arthralgias (Incuding right ankle, states hx of severe right ankle injury), gait problem and joint swelling (Right knee swelling, + clicking, no popping/giving way). SEE HPI. Neurological: Negative for dizziness, tingling, numbness and headaches. Hematological: Does not bruise/bleed easily. GENERAL PHYSICAL EXAM     Vitals: BP (S) (!) 168/76 Comment: 178/75  Pulse 83   Temp 98.1 °F (36.7 °C)   Resp 18   Ht 5' 11\" (1.803 m)   Wt 270 lb (122.5 kg)   SpO2 99%   BMI 37.66 kg/m²               Physical Exam  Vitals reviewed. Constitutional:       General: He is not in acute distress. Appearance: He is well-developed. He is obese. He is not ill-appearing, toxic-appearing or diaphoretic. HENT:      Head: Normocephalic.       Right Ear: External ear normal.      Left Ear: External ear normal.      Nose: Nose normal.      Mouth/Throat:      Dentition: Has dentures (Partial denture, bottom). Pharynx: No oropharyngeal exudate or posterior oropharyngeal erythema. Tonsils: No tonsillar abscesses. Eyes:      General:         Right eye: No discharge. Left eye: No discharge. Conjunctiva/sclera: Conjunctivae normal.      Pupils: Pupils are equal, round, and reactive to light. Cardiovascular:      Rate and Rhythm: Normal rate and regular rhythm. Pulses: Intact distal pulses. Heart sounds: Normal heart sounds. Pulmonary:      Effort: Pulmonary effort is normal. No accessory muscle usage or respiratory distress. Breath sounds: Wheezing (Faint expiratory wheeze to LLL, posterior- mostly cleared w/cough, otherwise all lung fields CTA) present. No decreased breath sounds, rhonchi or rales. Comments: Appears slightly SOB w/walking down acuna. Abdominal:      General: Abdomen is protuberant. Bowel sounds are normal. There is no distension. Palpations: Abdomen is soft. There is no mass. Tenderness: There is no abdominal tenderness. There is no guarding or rebound. Musculoskeletal:      Right knee: Swelling (Mild, no erythema, no warmth), bony tenderness and crepitus present. Decreased range of motion. Tenderness present over the lateral joint line. Normal pulse. Right lower leg: No tenderness. Edema (1-2+ pitting, no erythema, no warmth, including ankle) present. Left lower leg: No tenderness. Edema (1+ pitting, no erythema, no warmth, including ankle) present. Comments: Negative Audrey's sign b/l. Brace applied to right knee. Lymphadenopathy:      Cervical: No cervical adenopathy. Skin:     General: Skin is warm and dry. Neurological:      Mental Status: He is alert and oriented to person, place, and time.    Psychiatric:         Behavior: Behavior normal.         LAB REVIEW     Lab Results   Component Value Date    NA 139 03/24/2022    K 5.0 03/24/2022     03/24/2022    CO2 27 03/24/2022    BUN 15 03/24/2022    CREATININE 0.99 03/24/2022    GLUCOSE 104 (H) 03/24/2022    CALCIUM 9.5 03/24/2022    PROT 7.5 11/04/2021    LABALBU 4.3 11/04/2021    BILITOT 0.61 11/04/2021    ALKPHOS 76 11/04/2021    AST 20 11/04/2021    ALT 24 11/04/2021    LABGLOM >60 03/24/2022    GFRAA >60 03/24/2022     Lab Results   Component Value Date    WBC 8.0 03/24/2022    HGB 15.1 03/24/2022    HCT 44.7 03/24/2022    MCV 92.6 03/24/2022     03/24/2022     PRELIMINARY EKG REVIEW, DATE: 3-24-22     NSR  NORMAL ECG  74 BPM    SURGERY / PROVISIONAL DIAGNOSES:      KNEE ARTHROSCOPIC PARTIAL LATERAL MENISCECTOMY- RIGHT     RIGHT KNEE LATERAL MENISCUS    Patient Active Problem List    Diagnosis Date Noted    Preop examination 03/24/2022    Class 2 severe obesity due to excess calories with serious comorbidity and body mass index (BMI) of 38.0 to 38.9 in adult [facility]) 04/30/2021    Hyperglycemia 06/13/2019    Swelling of both lower extremities 06/13/2019    Class 2 drug-induced obesity with serious comorbidity and body mass index (BMI) of 38.0 to 38.9 in adult 06/13/2019    Chronic pain of right knee 02/13/2019    Osteopenia of right lower leg 01/29/2019    Mass of right thigh 01/22/2019    Seasonal allergic rhinitis due to pollen 08/14/2018    Cold sore 08/14/2018    Ventral hernia without obstruction or gangrene 08/14/2018    Pulmonary nodules 08/14/2018    Acute pain of right shoulder 02/09/2018    Basal cell carcinoma of left upper arm 02/09/2018    Essential hypertension 08/09/2017    Pure hypercholesterolemia 08/09/2017    Chronic obstructive pulmonary disease ([address] 08/09/2017    Bilateral hearing loss 08/09/2017    Tinnitus aurium 08/09/2017    History of tobacco abuse 06/12/2015    Tubular adenoma of colon 06/24/2013    Allergic rhinitis     Anxiety     Hyperplastic colon polyp     Gilliam's esophagus without dysplasia  GERD (gastroesophageal reflux disease)     Bleeding ulcer     Environmental allergies          CLEARANCE: Based on my personal evaluation of patient including review of patient's chart, medical clearance required for scheduled surgery. Surgery scheduling will notify Dr. Enriqueta Elizondo office who will be responsible for making sure the clearance is obtained and is in the chart for surgery.     Total time spent on encounter- PAT provider minutes: 31-40 minutes     VIKY Ochoa CNP on 3/24/2022 at 2:20 PM

## 2022-03-24 NOTE — H&P (VIEW-ONLY)
HISTORY and [facility] [address] [address] [address]       NAME:  Ketty Brito  MRN: [id number]   YOB: 1955   Date: 3/24/2022   Age: 74 y.o. Gender: male     COMPLAINT AND PRESENT HISTORY:   Ketty Brito is 74 y.o.,  male, presents for pre-anesthesia/admission testing for KNEE ARTHROSCOPIC PARTIAL LATERAL MENISCECTOMY- RIGHT per Dr. Viral Sun. Primary dx: RIGHT KNEE LATERAL MENISCUS.    HPI:  Knee Pain   Incident onset: Patient states that in December 2021, kicked a block, had chronic issues with right knee, but after this knee pain increased- admits to weight gain. The incident occurred at home. The pain is present in the right knee. The quality of the pain is described as aching (Dull, ache). The pain is at a severity of 5/10. The pain has been fluctuating (Worse laying down at night) since onset. Associated symptoms include an inability to bear weight. Pertinent negatives include no loss of motion, loss of sensation, numbness or tingling. Associated symptoms comments: States pain was more severe for 6 weeks, had right hand issue- tx w/prednisone- then noted knee felt better- nephew is a doctor and thought was he had gout to right hand (not officially dx'd)- states he used a tart cherry supplement- takes 1/day. The symptoms are aggravated by movement and weight bearing. He has tried immobilization (Chiropractor) for the symptoms.      Testing completed r/t condition:  X-RAY RIGHT KNEE, 2-21-22:  Narrative   X-rays taken a reviewed by me show standing AP both knees allow the right    knee.  Patient has moderate lateral joint space narrowing the right knee    and some early spur formation as well.  Medial compartment is relatively    minimally narrowed.  He does have a slight effusion present as visualized    on the lateral.  No other acute process is noted.  Patient's left knee is    relatively unremarkable     Narrative   EXAMINATION:   MRI OF THE RIGHT KNEE WITHOUT CONTRAST, 1/25/2022 6:44 am     TECHNIQUE:   Multiplanar multisequence MRI of the right knee was performed without the   administration of intravenous contrast.       COMPARISON:   X-ray dated 02/06/2019.       HISTORY:   ORDERING SYSTEM PROVIDED HISTORY: Chronic pain of right knee   TECHNOLOGIST PROVIDED HISTORY:   Chronic pain of right knee   Reason for Exam: Chronic pain of right knee. Patient states he twisted his   right knee. Pain in the knee cap area.       FINDINGS:   MENISCI: There is complex signal abnormality throughout the lateral meniscus. There is a curvilinear parameniscal cyst along the lateral meniscal body,   measuring 0.7 x 0.8 x 3.7 cm.  Medial meniscus is intact.       CRUCIATE LIGAMENTS: Anterior cruciate and posterior cruciate ligaments are   intact.       EXTENSOR MECHANISM: Extensor mechanism is intact.       LATERAL COLLATERAL LIGAMENT COMPLEX: Distal iliotibial band, lateral   collateral ligament, and distal biceps femoris tendon are intact.  Proximal   popliteus tendon is intact.       MEDIAL COLLATERAL LIGAMENT COMPLEX: Medial collateral ligament is intact.       KNEE JOINT: There is a large joint effusion. Lucetta Record is a large complex   Baker's cyst, measuring up to 6.4 cm in craniocaudal dimension.  Visualized   muscle signal is within normal limits.  There is moderate chondral thinning   in the patellofemoral compartment, most significant along the lateral   patellar facet.  There is moderate chondral thinning and fissuring in the   lateral compartment.  Mild chondral thinning in the medial compartment.    There is mild tricompartmental osteophytic spurring.  There is moderate   ventral subcutaneous edema.       BONE MARROW: Bone marrow signal is within normal limits.           Impression   1.  Complex tear of the lateral meniscus with parameniscal cyst along the   lateral meniscal body.       2.  Tricompartmental osteoarthritis, most significant in the lateral and   patellofemoral compartments.       3. Tomas Espinal joint effusion and large Baker's cyst.     Review of additional significant medical hx:  DHILLON ESOPHAGUS, BLEEDING ULCER, GERD: Patient states duodenal bleeding ulcer in the past, also had \"perforated stomach\" in 1985. Denies current ABD pain. States last issue w/bleeding was in 2000. States hx of H. Pylori. Denies dysphagia, pharyngitis, voice change. Current medications r/t condition: PREVACID    COPD: Denies current SOB (admits to SOBOE), + wheezing in the AM, denies cough. Former smoker. Follows w/Dr. Ese Leiva. States he was tested for sleep apnea- does not have a machine, dx'd with \"mild sleep apnea\". Current medications r/t condition: SPIRIVA    HTN, HLD, HX OF CHEST TIGHTNESS: Patient takes BP medication at night. He states typically SBP is <150. Denies current/recent chest pain, palpitations (hx of \"fluttering\"- none currently- denies hx of cardiac murmur), SOB, dizziness, + leg swelling (+ b/l LE, ankle swelling- chronic), headache (states rare). States chest tightness after eating at times- stress test ordered, but not yet completed. Current medications r/t condition: AMLODIPINE, SIMVASTATIN, ASA  BP Readings from Last 3 Encounters:   03/24/22 (S) (!) 168/76   11/05/21 136/80   10/19/21 (!) 163/75     Denies hx of MRSA infection. Denies hx of blood clots. Denies hx of any personal or family hx of complications w/anesthesia.    PAST MEDICAL HISTORY     Past Medical History:   Diagnosis Date    Allergic rhinitis     Anxiety     Arthritis     Dhillon esophagus     Basal cell carcinoma of left upper arm 02/09/2018    Bleeding ulcer     Also states hx of \"perforated stomach\" in 1985    Chest tightness     Chronic pain of right knee     Colon polyps     COPD (chronic obstructive pulmonary disease) (HCC)     Environmental allergies     GERD (gastroesophageal reflux disease)     H. pylori infection     History of blood transfusion     Patient denies any issues with this    Hyperlipidemia     Hyperplastic colon polyp     Hypertension     Lung nodule     has follow up CT scans    Mild sleep apnea     Palpitations     Perforated stomach ([address] 1985    Pure hypercholesterolemia     Tubular adenoma of colon 2012     Dr. Christoph Berrios colonoscopy    Wears partial dentures     Bottom       SURGICAL HISTORY       Past Surgical History:   Procedure Laterality Date    APPENDECTOMY      COLONOSCOPY      COLONOSCOPY  2012    COLONOSCOPY N/A 2019    COLONOSCOPY POLYPECTOMY SNARE/COLD BIOPSY performed by Karthikeyan Castillo MD at [address] [place] [address],[address]  10/05/2018    SKIN CANCER EXCISION      STOMACH SURGERY      Perforated Stomach repair    TONSILLECTOMY      UMBILICAL HERNIA REPAIR  2011    UPPER GASTROINTESTINAL ENDOSCOPY      UPPER GASTROINTESTINAL ENDOSCOPY N/A 2019    EGD BIOPSY performed by Karthikeyan Castillo MD at [place] [address] History     Socioeconomic History    Marital status:      Spouse name: None    Number of children: None    Years of education: None    Highest education level: None   Occupational History    None   Tobacco Use    Smoking status: Former Smoker     Packs/day: 1.00     Years: 30.00     Pack years: 30.00     Start date:      Quit date: 2014     Years since quittin.2    Smokeless tobacco: Never Used   Vaping Use    Vaping Use: Never used   Substance and Sexual Activity    Alcohol use:  Yes     Alcohol/week: 6.0 standard drinks     Types: 6 Cans of beer per week     Comment: 6 cans beer/day    Drug use: No    Sexual activity: None   Other Topics Concern    None   Social History Narrative    None     Social Determinants of Health     Financial Resource Strain: Low Risk     Difficulty of Paying Living Expenses: Not hard at all   Food Insecurity: No Food Insecurity    Worried About Running Out of Food in the Last Year: Never true    Tessy of Food in the Last Year: Never true Transportation Needs:     Lack of Transportation (Medical): Not on file    Lack of Transportation (Non-Medical):  Not on file   Physical Activity:     Days of Exercise per Week: Not on file    Minutes of Exercise per Session: Not on file   Stress:     Feeling of Stress : Not on file   Social Connections:     Frequency of Communication with Friends and Family: Not on file    Frequency of Social Gatherings with Friends and Family: Not on file    Attends Sikh Services: Not on file    Active Member of [address] [place] [address] or Organizations: Not on file    Attends Club or Organization Meetings: Not on file    Marital Status: Not on file   Intimate Partner Violence:     Fear of Current or Ex-Partner: Not on file    Emotionally Abused: Not on file    Physically Abused: Not on file    Sexually Abused: Not on file   Housing Stability:     Unable to Pay for Housing in the Last Year: Not on file    Number of [place] in the Last Year: Not on file    Unstable Housing in the Last Year: Not on file       REVIEW OF SYSTEMS      Allergies   Allergen Reactions    Compazine Spansule  [Prochlorperazine]      Other reaction(s): Unknown    Metoclopramide     Prochlorperazine Edisylate      seizure    Reglan  [Metoclopramide Hcl]      Other reaction(s): Unknown    Reglan [Metoclopramide Hcl]      seizure    Seasonal        Current Outpatient Medications on File Prior to Encounter   Medication Sig Dispense Refill    amLODIPine (NORVASC) 10 MG tablet Take 10 mg by mouth at bedtime       TART CHERRY PO Take 1 capsule by mouth daily      lansoprazole (PREVACID) 30 MG delayed release capsule TAKE 1 CAPSULE DAILY 90 capsule 3    SPIRIVA RESPIMAT 2.5 MCG/ACT AERS inhaler USE 2 INHALATIONS DAILY 3 each 1    simvastatin (ZOCOR) 20 MG tablet TAKE 1 TABLET NIGHTLY 90 tablet 3    fluticasone (FLONASE) 50 MCG/ACT nasal spray USE 2 SPRAYS NASALLY DAILY 48 g 3    vitamin D3 (CHOLECALCIFEROL) 25 MCG (1000 UT) TABS tablet Take 1 tablet by mouth daily 90 tablet 3    aspirin 81 MG tablet Take 81 mg by mouth daily. Current Facility-Administered Medications on File Prior to Encounter   Medication Dose Route Frequency Provider Last Rate Last Admin    triamcinolone acetonide (KENALOG-40) injection 40 mg  40 mg IntraMUSCular Once Coleman Sprain, APRN - CNP           Review of Systems   Constitutional: Negative for chills and fever. HENT: Negative for congestion, ear pain, rhinorrhea, sore throat and trouble swallowing. Respiratory: Positive for apnea (Hx of mild sleep apnea), chest tightness (States chest tightness after eating at times, denies currently- intermittent issue), shortness of breath (W/exertion) and wheezing (In the AM). Negative for cough. Cardiovascular: Positive for palpitations (Intermittent \"fluttering\"- none currently) and leg swelling (B/l ankles, LE's, states RLE worse than LLE (chronic)). Negative for chest pain. Gastrointestinal: Negative for abdominal pain, anal bleeding, blood in stool, constipation, diarrhea, nausea and vomiting. Genitourinary: Negative for dysuria and frequency. Musculoskeletal: Positive for arthralgias (Incuding right ankle, states hx of severe right ankle injury), gait problem and joint swelling (Right knee swelling, + clicking, no popping/giving way). SEE HPI. Neurological: Negative for dizziness, tingling, numbness and headaches. Hematological: Does not bruise/bleed easily. GENERAL PHYSICAL EXAM     Vitals: BP (S) (!) 168/76 Comment: 178/75  Pulse 83   Temp 98.1 °F (36.7 °C)   Resp 18   Ht 5' 11\" (1.803 m)   Wt 270 lb (122.5 kg)   SpO2 99%   BMI 37.66 kg/m²               Physical Exam  Vitals reviewed. Constitutional:       General: He is not in acute distress. Appearance: He is well-developed. He is obese. He is not ill-appearing, toxic-appearing or diaphoretic. HENT:      Head: Normocephalic.       Right Ear: External ear normal.      Left Ear: External ear normal.      Nose: Nose normal.      Mouth/Throat:      Dentition: Has dentures (Partial denture, bottom). Pharynx: No oropharyngeal exudate or posterior oropharyngeal erythema. Tonsils: No tonsillar abscesses. Eyes:      General:         Right eye: No discharge. Left eye: No discharge. Conjunctiva/sclera: Conjunctivae normal.      Pupils: Pupils are equal, round, and reactive to light. Cardiovascular:      Rate and Rhythm: Normal rate and regular rhythm. Pulses: Intact distal pulses. Heart sounds: Normal heart sounds. Pulmonary:      Effort: Pulmonary effort is normal. No accessory muscle usage or respiratory distress. Breath sounds: Wheezing (Faint expiratory wheeze to LLL, posterior- mostly cleared w/cough, otherwise all lung fields CTA) present. No decreased breath sounds, rhonchi or rales. Comments: Appears slightly SOB w/walking down acuna. Abdominal:      General: Abdomen is protuberant. Bowel sounds are normal. There is no distension. Palpations: Abdomen is soft. There is no mass. Tenderness: There is no abdominal tenderness. There is no guarding or rebound. Musculoskeletal:      Right knee: Swelling (Mild, no erythema, no warmth), bony tenderness and crepitus present. Decreased range of motion. Tenderness present over the lateral joint line. Normal pulse. Right lower leg: No tenderness. Edema (1-2+ pitting, no erythema, no warmth, including ankle) present. Left lower leg: No tenderness. Edema (1+ pitting, no erythema, no warmth, including ankle) present. Comments: Negative Audrey's sign b/l. Brace applied to right knee. Lymphadenopathy:      Cervical: No cervical adenopathy. Skin:     General: Skin is warm and dry. Neurological:      Mental Status: He is alert and oriented to person, place, and time.    Psychiatric:         Behavior: Behavior normal.         LAB REVIEW     Lab Results   Component Value Date    NA 139 03/24/2022    K 5.0 03/24/2022     03/24/2022    CO2 27 03/24/2022    BUN 15 03/24/2022    CREATININE 0.99 03/24/2022    GLUCOSE 104 (H) 03/24/2022    CALCIUM 9.5 03/24/2022    PROT 7.5 11/04/2021    LABALBU 4.3 11/04/2021    BILITOT 0.61 11/04/2021    ALKPHOS 76 11/04/2021    AST 20 11/04/2021    ALT 24 11/04/2021    LABGLOM >60 03/24/2022    GFRAA >60 03/24/2022     Lab Results   Component Value Date    WBC 8.0 03/24/2022    HGB 15.1 03/24/2022    HCT 44.7 03/24/2022    MCV 92.6 03/24/2022     03/24/2022     PRELIMINARY EKG REVIEW, DATE: 3-24-22     NSR  NORMAL ECG  74 BPM    SURGERY / PROVISIONAL DIAGNOSES:      KNEE ARTHROSCOPIC PARTIAL LATERAL MENISCECTOMY- RIGHT     RIGHT KNEE LATERAL MENISCUS    Patient Active Problem List    Diagnosis Date Noted    Preop examination 03/24/2022    Class 2 severe obesity due to excess calories with serious comorbidity and body mass index (BMI) of 38.0 to 38.9 in adult [facility]) 04/30/2021    Hyperglycemia 06/13/2019    Swelling of both lower extremities 06/13/2019    Class 2 drug-induced obesity with serious comorbidity and body mass index (BMI) of 38.0 to 38.9 in adult 06/13/2019    Chronic pain of right knee 02/13/2019    Osteopenia of right lower leg 01/29/2019    Mass of right thigh 01/22/2019    Seasonal allergic rhinitis due to pollen 08/14/2018    Cold sore 08/14/2018    Ventral hernia without obstruction or gangrene 08/14/2018    Pulmonary nodules 08/14/2018    Acute pain of right shoulder 02/09/2018    Basal cell carcinoma of left upper arm 02/09/2018    Essential hypertension 08/09/2017    Pure hypercholesterolemia 08/09/2017    Chronic obstructive pulmonary disease ([address] 08/09/2017    Bilateral hearing loss 08/09/2017    Tinnitus aurium 08/09/2017    History of tobacco abuse 06/12/2015    Tubular adenoma of colon 06/24/2013    Allergic rhinitis     Anxiety     Hyperplastic colon polyp     Gilliam's esophagus without dysplasia  GERD (gastroesophageal reflux disease)     Bleeding ulcer     Environmental allergies          CLEARANCE: Based on my personal evaluation of patient including review of patient's chart, medical clearance required for scheduled surgery. Surgery scheduling will notify Dr. Aleks Erazo office who will be responsible for making sure the clearance is obtained and is in the chart for surgery.     Total time spent on encounter- PAT provider minutes: 31-40 minutes     VIKY Lozoya CNP on 3/24/2022 at 2:20 PM

## 2022-03-25 LAB
EKG ATRIAL RATE: 74 BPM
EKG P AXIS: 70 DEGREES
EKG P-R INTERVAL: 166 MS
EKG Q-T INTERVAL: 400 MS
EKG QRS DURATION: 94 MS
EKG QTC CALCULATION (BAZETT): 444 MS
EKG R AXIS: 78 DEGREES
EKG T AXIS: 52 DEGREES
EKG VENTRICULAR RATE: 74 BPM

## 2022-03-25 PROCEDURE — 93010 ELECTROCARDIOGRAM REPORT: CPT | Performed by: INTERNAL MEDICINE

## 2022-04-06 ENCOUNTER — ANESTHESIA EVENT (OUTPATIENT)
Dept: OPERATING ROOM | Age: 67
End: 2022-04-06
Payer: MEDICARE

## 2022-04-07 ENCOUNTER — ANESTHESIA (OUTPATIENT)
Dept: OPERATING ROOM | Age: 67
End: 2022-04-07
Payer: MEDICARE

## 2022-04-07 ENCOUNTER — HOSPITAL ENCOUNTER (OUTPATIENT)
Age: 67
Setting detail: OUTPATIENT SURGERY
Discharge: HOME | End: 2022-04-07
Attending: ORTHOPAEDIC SURGERY | Admitting: ORTHOPAEDIC SURGERY
Payer: MEDICARE

## 2022-04-07 VITALS
RESPIRATION RATE: 17 BRPM | HEART RATE: 61 BPM | SYSTOLIC BLOOD PRESSURE: 177 MMHG | TEMPERATURE: 97 F | BODY MASS INDEX: 37.8 KG/M2 | HEIGHT: 71 IN | DIASTOLIC BLOOD PRESSURE: 84 MMHG | OXYGEN SATURATION: 97 % | WEIGHT: 270 LBS

## 2022-04-07 VITALS — SYSTOLIC BLOOD PRESSURE: 158 MMHG | TEMPERATURE: 96.3 F | DIASTOLIC BLOOD PRESSURE: 99 MMHG | OXYGEN SATURATION: 100 %

## 2022-04-07 DIAGNOSIS — S83.281A ACUTE LATERAL MENISCUS TEAR OF RIGHT KNEE, INITIAL ENCOUNTER: Primary | ICD-10-CM

## 2022-04-07 PROCEDURE — 7100000030 HC ASPR PHASE II RECOVERY - FIRST 15 MIN: Performed by: ORTHOPAEDIC SURGERY

## 2022-04-07 PROCEDURE — 2500000003 HC RX 250 WO HCPCS: Performed by: NURSE ANESTHETIST, CERTIFIED REGISTERED

## 2022-04-07 PROCEDURE — 29880 ARTHRS KNE SRG MNISECTMY M&L: CPT | Performed by: ORTHOPAEDIC SURGERY

## 2022-04-07 PROCEDURE — 6360000002 HC RX W HCPCS: Performed by: ORTHOPAEDIC SURGERY

## 2022-04-07 PROCEDURE — 7100000010 HC PHASE II RECOVERY - FIRST 15 MIN: Performed by: ORTHOPAEDIC SURGERY

## 2022-04-07 PROCEDURE — 7100000011 HC PHASE II RECOVERY - ADDTL 15 MIN: Performed by: ORTHOPAEDIC SURGERY

## 2022-04-07 PROCEDURE — 2709999900 HC NON-CHARGEABLE SUPPLY: Performed by: ORTHOPAEDIC SURGERY

## 2022-04-07 PROCEDURE — 3700000001 HC ADD 15 MINUTES (ANESTHESIA): Performed by: ORTHOPAEDIC SURGERY

## 2022-04-07 PROCEDURE — 3600000003 HC SURGERY LEVEL 3 BASE: Performed by: ORTHOPAEDIC SURGERY

## 2022-04-07 PROCEDURE — 7100000001 HC PACU RECOVERY - ADDTL 15 MIN: Performed by: ORTHOPAEDIC SURGERY

## 2022-04-07 PROCEDURE — 7100000000 HC PACU RECOVERY - FIRST 15 MIN: Performed by: ORTHOPAEDIC SURGERY

## 2022-04-07 PROCEDURE — 3700000000 HC ANESTHESIA ATTENDED CARE: Performed by: ORTHOPAEDIC SURGERY

## 2022-04-07 PROCEDURE — 7100000031 HC ASPR PHASE II RECOVERY - ADDTL 15 MIN: Performed by: ORTHOPAEDIC SURGERY

## 2022-04-07 PROCEDURE — 3600000013 HC SURGERY LEVEL 3 ADDTL 15MIN: Performed by: ORTHOPAEDIC SURGERY

## 2022-04-07 PROCEDURE — 2580000003 HC RX 258: Performed by: ANESTHESIOLOGY

## 2022-04-07 PROCEDURE — 6360000002 HC RX W HCPCS: Performed by: NURSE ANESTHETIST, CERTIFIED REGISTERED

## 2022-04-07 RX ORDER — SODIUM CHLORIDE 9 MG/ML
INJECTION, SOLUTION INTRAVENOUS PRN
Status: DISCONTINUED | OUTPATIENT
Start: 2022-04-07 | End: 2022-04-07 | Stop reason: HOSPADM

## 2022-04-07 RX ORDER — PROPOFOL 10 MG/ML
INJECTION, EMULSION INTRAVENOUS PRN
Status: DISCONTINUED | OUTPATIENT
Start: 2022-04-07 | End: 2022-04-07 | Stop reason: SDUPTHER

## 2022-04-07 RX ORDER — SODIUM CHLORIDE 0.9 % (FLUSH) 0.9 %
5-40 SYRINGE (ML) INJECTION PRN
Status: DISCONTINUED | OUTPATIENT
Start: 2022-04-07 | End: 2022-04-07 | Stop reason: HOSPADM

## 2022-04-07 RX ORDER — FENTANYL CITRATE 50 UG/ML
25 INJECTION, SOLUTION INTRAMUSCULAR; INTRAVENOUS EVERY 5 MIN PRN
Status: DISCONTINUED | OUTPATIENT
Start: 2022-04-07 | End: 2022-04-07 | Stop reason: HOSPADM

## 2022-04-07 RX ORDER — DEXAMETHASONE SODIUM PHOSPHATE 4 MG/ML
INJECTION, SOLUTION INTRA-ARTICULAR; INTRALESIONAL; INTRAMUSCULAR; INTRAVENOUS; SOFT TISSUE PRN
Status: DISCONTINUED | OUTPATIENT
Start: 2022-04-07 | End: 2022-04-07 | Stop reason: SDUPTHER

## 2022-04-07 RX ORDER — DIPHENHYDRAMINE HYDROCHLORIDE 50 MG/ML
12.5 INJECTION INTRAMUSCULAR; INTRAVENOUS
Status: DISCONTINUED | OUTPATIENT
Start: 2022-04-07 | End: 2022-04-07 | Stop reason: HOSPADM

## 2022-04-07 RX ORDER — SODIUM CHLORIDE 0.9 % (FLUSH) 0.9 %
10 SYRINGE (ML) INJECTION EVERY 12 HOURS SCHEDULED
Status: DISCONTINUED | OUTPATIENT
Start: 2022-04-07 | End: 2022-04-07 | Stop reason: HOSPADM

## 2022-04-07 RX ORDER — MEPERIDINE HYDROCHLORIDE 25 MG/ML
12.5 INJECTION INTRAMUSCULAR; INTRAVENOUS; SUBCUTANEOUS EVERY 5 MIN PRN
Status: DISCONTINUED | OUTPATIENT
Start: 2022-04-07 | End: 2022-04-07 | Stop reason: HOSPADM

## 2022-04-07 RX ORDER — FENTANYL CITRATE 50 UG/ML
INJECTION, SOLUTION INTRAMUSCULAR; INTRAVENOUS PRN
Status: DISCONTINUED | OUTPATIENT
Start: 2022-04-07 | End: 2022-04-07 | Stop reason: SDUPTHER

## 2022-04-07 RX ORDER — LIDOCAINE HYDROCHLORIDE 10 MG/ML
1 INJECTION, SOLUTION EPIDURAL; INFILTRATION; INTRACAUDAL; PERINEURAL
Status: DISCONTINUED | OUTPATIENT
Start: 2022-04-07 | End: 2022-04-07 | Stop reason: HOSPADM

## 2022-04-07 RX ORDER — HYDROCODONE BITARTRATE AND ACETAMINOPHEN 5; 325 MG/1; MG/1
1 TABLET ORAL ONCE
Status: DISCONTINUED | OUTPATIENT
Start: 2022-04-07 | End: 2022-04-07 | Stop reason: HOSPADM

## 2022-04-07 RX ORDER — HYDROCODONE BITARTRATE AND ACETAMINOPHEN 5; 325 MG/1; MG/1
1 TABLET ORAL EVERY 6 HOURS PRN
Qty: 20 TABLET | Refills: 0 | Status: SHIPPED | OUTPATIENT
Start: 2022-04-07 | End: 2022-04-12

## 2022-04-07 RX ORDER — SODIUM CHLORIDE 0.9 % (FLUSH) 0.9 %
10 SYRINGE (ML) INJECTION PRN
Status: DISCONTINUED | OUTPATIENT
Start: 2022-04-07 | End: 2022-04-07 | Stop reason: HOSPADM

## 2022-04-07 RX ORDER — ONDANSETRON 2 MG/ML
4 INJECTION INTRAMUSCULAR; INTRAVENOUS
Status: DISCONTINUED | OUTPATIENT
Start: 2022-04-07 | End: 2022-04-07 | Stop reason: HOSPADM

## 2022-04-07 RX ORDER — ROPIVACAINE HYDROCHLORIDE 5 MG/ML
INJECTION, SOLUTION EPIDURAL; INFILTRATION; PERINEURAL PRN
Status: DISCONTINUED | OUTPATIENT
Start: 2022-04-07 | End: 2022-04-07 | Stop reason: ALTCHOICE

## 2022-04-07 RX ORDER — LIDOCAINE HYDROCHLORIDE 10 MG/ML
INJECTION, SOLUTION EPIDURAL; INFILTRATION; INTRACAUDAL; PERINEURAL PRN
Status: DISCONTINUED | OUTPATIENT
Start: 2022-04-07 | End: 2022-04-07 | Stop reason: SDUPTHER

## 2022-04-07 RX ORDER — SODIUM CHLORIDE 0.9 % (FLUSH) 0.9 %
5-40 SYRINGE (ML) INJECTION EVERY 12 HOURS SCHEDULED
Status: DISCONTINUED | OUTPATIENT
Start: 2022-04-07 | End: 2022-04-07 | Stop reason: HOSPADM

## 2022-04-07 RX ORDER — SODIUM CHLORIDE 9 MG/ML
25 INJECTION, SOLUTION INTRAVENOUS PRN
Status: DISCONTINUED | OUTPATIENT
Start: 2022-04-07 | End: 2022-04-07 | Stop reason: HOSPADM

## 2022-04-07 RX ORDER — MIDAZOLAM HYDROCHLORIDE 1 MG/ML
INJECTION INTRAMUSCULAR; INTRAVENOUS PRN
Status: DISCONTINUED | OUTPATIENT
Start: 2022-04-07 | End: 2022-04-07 | Stop reason: SDUPTHER

## 2022-04-07 RX ORDER — SODIUM CHLORIDE, SODIUM LACTATE, POTASSIUM CHLORIDE, CALCIUM CHLORIDE 600; 310; 30; 20 MG/100ML; MG/100ML; MG/100ML; MG/100ML
INJECTION, SOLUTION INTRAVENOUS CONTINUOUS
Status: DISCONTINUED | OUTPATIENT
Start: 2022-04-07 | End: 2022-04-07 | Stop reason: HOSPADM

## 2022-04-07 RX ORDER — ONDANSETRON 2 MG/ML
INJECTION INTRAMUSCULAR; INTRAVENOUS PRN
Status: DISCONTINUED | OUTPATIENT
Start: 2022-04-07 | End: 2022-04-07 | Stop reason: SDUPTHER

## 2022-04-07 RX ADMIN — ONDANSETRON 4 MG: 2 INJECTION INTRAMUSCULAR; INTRAVENOUS at 10:56

## 2022-04-07 RX ADMIN — MIDAZOLAM 2 MG: 1 INJECTION INTRAMUSCULAR; INTRAVENOUS at 10:46

## 2022-04-07 RX ADMIN — DEXAMETHASONE SODIUM PHOSPHATE 4 MG: 4 INJECTION, SOLUTION INTRAMUSCULAR; INTRAVENOUS at 10:56

## 2022-04-07 RX ADMIN — SODIUM CHLORIDE, POTASSIUM CHLORIDE, SODIUM LACTATE AND CALCIUM CHLORIDE: 600; 310; 30; 20 INJECTION, SOLUTION INTRAVENOUS at 09:03

## 2022-04-07 RX ADMIN — FENTANYL CITRATE 50 MCG: 50 INJECTION, SOLUTION INTRAMUSCULAR; INTRAVENOUS at 10:52

## 2022-04-07 RX ADMIN — PROPOFOL 200 MG: 10 INJECTION, EMULSION INTRAVENOUS at 10:52

## 2022-04-07 RX ADMIN — FENTANYL CITRATE 50 MCG: 50 INJECTION, SOLUTION INTRAMUSCULAR; INTRAVENOUS at 11:07

## 2022-04-07 RX ADMIN — LIDOCAINE HYDROCHLORIDE 40 MG: 10 INJECTION, SOLUTION EPIDURAL; INFILTRATION; INTRACAUDAL; PERINEURAL at 10:52

## 2022-04-07 ASSESSMENT — PULMONARY FUNCTION TESTS
PIF_VALUE: 0
PIF_VALUE: 1
PIF_VALUE: 11
PIF_VALUE: 11
PIF_VALUE: 0
PIF_VALUE: 11
PIF_VALUE: 15
PIF_VALUE: 1
PIF_VALUE: 19
PIF_VALUE: 1
PIF_VALUE: 13
PIF_VALUE: 1
PIF_VALUE: 11
PIF_VALUE: 0
PIF_VALUE: 11
PIF_VALUE: 2
PIF_VALUE: 0
PIF_VALUE: 12
PIF_VALUE: 11
PIF_VALUE: 2
PIF_VALUE: 11
PIF_VALUE: 11
PIF_VALUE: 4
PIF_VALUE: 12
PIF_VALUE: 15
PIF_VALUE: 11
PIF_VALUE: 14
PIF_VALUE: 1
PIF_VALUE: 13
PIF_VALUE: 4
PIF_VALUE: 11
PIF_VALUE: 13
PIF_VALUE: 1
PIF_VALUE: 12
PIF_VALUE: 11
PIF_VALUE: 0
PIF_VALUE: 1
PIF_VALUE: 13
PIF_VALUE: 11
PIF_VALUE: 12

## 2022-04-07 ASSESSMENT — PAIN SCALES - GENERAL
PAINLEVEL_OUTOF10: 0

## 2022-04-07 ASSESSMENT — PAIN - FUNCTIONAL ASSESSMENT: PAIN_FUNCTIONAL_ASSESSMENT: 0-10

## 2022-04-07 NOTE — ANESTHESIA PRE PROCEDURE
Department of Anesthesiology  Preprocedure Note       Name:  Earnestine Rand   Age:  74 y.o.  :  1955                                          MRN:  [id number]         Date:  2022      Surgeon: Tobin Ruizor):  Violetta Moore MD    Procedure: Procedure(s):  KNEE ARTHROSCOPIC PARTIAL LATERAL MENISCECTOMY    Medications prior to admission:   Prior to Admission medications    Medication Sig Start Date End Date Taking? Authorizing Provider   HYDROcodone-acetaminophen (NORCO) 5-325 MG per tablet Take 1 tablet by mouth every 6 hours as needed for Pain for up to 5 days. Intended supply: 5 days. Take lowest dose possible to manage pain 22 Yes Violetta Moore MD   amLODIPine (NORVASC) 10 MG tablet Take 10 mg by mouth at bedtime     Historical Provider, MD   TART CHERRY PO Take 1 capsule by mouth daily    Historical Provider, MD   lansoprazole (PREVACID) 30 MG delayed release capsule TAKE 1 CAPSULE DAILY 22   VIKY Golden CNP   SPIRIVA RESPIMAT 2.5 MCG/ACT AERS inhaler USE 2 INHALATIONS DAILY 10/18/21   Frederic Plasencia MD   simvastatin (ZOCOR) 20 MG tablet TAKE 1 TABLET NIGHTLY 10/18/21   VKIY Golden CNP   fluticasone (FLONASE) 50 MCG/ACT nasal spray USE 2 SPRAYS NASALLY DAILY 21   VIKY Jin CNP   vitamin D3 (CHOLECALCIFEROL) 25 MCG (1000 UT) TABS tablet Take 1 tablet by mouth daily 3/19/21   VIKY Golden CNP   aspirin 81 MG tablet Take 81 mg by mouth daily.       Historical Provider, MD       Current medications:    Current Facility-Administered Medications   Medication Dose Route Frequency Provider Last Rate Last Admin    lidocaine PF 1 % injection 1 mL  1 mL IntraDERmal Once PRN Estefanía Rolle MD        lactated ringers infusion   IntraVENous Continuous Estefanía Rolle  mL/hr at 22 0903 New Bag at 22 0903    sodium chloride flush 0.9 % injection 10 mL  10 mL IntraVENous 2 times per day Estefanía Rolle MD        sodium chloride flush 0.9 % injection 10 mL  10 mL IntraVENous PRN Yamila Vick MD        0.9 % sodium chloride infusion  25 mL IntraVENous PRN Yamila Vick MD           Allergies:     Allergies   Allergen Reactions    Compazine Spansule  [Prochlorperazine]      Other reaction(s): Unknown    Metoclopramide     Prochlorperazine Edisylate      seizure    Reglan  [Metoclopramide Hcl]      Other reaction(s): Unknown    Reglan [Metoclopramide Hcl]      seizure    Seasonal        Problem List:    Patient Active Problem List   Diagnosis Code    Allergic rhinitis J30.9    Anxiety F41.9    Hyperplastic colon polyp K63.5    Gilliam's esophagus without dysplasia K22.70    GERD (gastroesophageal reflux disease) K21.9    Bleeding ulcer K27.4    Environmental allergies     Tubular adenoma of colon D12.6    History of tobacco abuse Z87.891    Essential hypertension I10    Pure hypercholesterolemia E78.00    Chronic obstructive pulmonary disease (HCC) J44.9    Bilateral hearing loss H91.93    Tinnitus aurium H93.19    Acute pain of right shoulder M25.511    Basal cell carcinoma of left upper arm C44.619    Seasonal allergic rhinitis due to pollen J30.1    Cold sore B00.1    Ventral hernia without obstruction or gangrene K43.9    Pulmonary nodules R91.8    Mass of right thigh R22.41    Osteopenia of right lower leg M85.861    Chronic pain of right knee M25.561, G89.29    Hyperglycemia R73.9    Swelling of both lower extremities M79.89    Class 2 drug-induced obesity with serious comorbidity and body mass index (BMI) of 38.0 to 38.9 in adult E66.1, Z68.38    Class 2 severe obesity due to excess calories with serious comorbidity and body mass index (BMI) of 38.0 to 38.9 in adult (HCC) E66.01, Z68.38    Preop examination Z01.818       Past Medical History:        Diagnosis Date    Allergic rhinitis     Anxiety     Arthritis     Gilliam esophagus     Basal cell carcinoma of left upper arm 02/09/2018    Bleeding ulcer     Also states hx of \"perforated stomach\" in     Chest tightness     Chronic pain of right knee     Colon polyps     COPD (chronic obstructive pulmonary disease) (HCC)     Environmental allergies     GERD (gastroesophageal reflux disease)     H. pylori infection     History of blood transfusion     Patient denies any issues with this    Hyperlipidemia     Hyperplastic colon polyp     Hypertension     Lung nodule     has follow up CT scans    Mild sleep apnea     Palpitations     Perforated stomach ([address] 1985    Pure hypercholesterolemia     Tubular adenoma of colon 2012     Dr. Oralee Saint colonoscopy    Wears partial dentures     Bottom       Past Surgical History:        Procedure Laterality Date    APPENDECTOMY      COLONOSCOPY      COLONOSCOPY  2012    COLONOSCOPY N/A 2019    COLONOSCOPY POLYPECTOMY SNARE/COLD BIOPSY performed by Chitra Bah MD at [address]  10/05/2018    SKIN CANCER EXCISION      STOMACH SURGERY      Perforated Stomach repair    TONSILLECTOMY      UMBILICAL HERNIA REPAIR  2011    UPPER GASTROINTESTINAL ENDOSCOPY      UPPER GASTROINTESTINAL ENDOSCOPY N/A 2019    EGD BIOPSY performed by Chitra Bah MD at [facility] History:    Social History     Tobacco Use    Smoking status: Former Smoker     Packs/day: 1.00     Years: 30.00     Pack years: 30.00     Start date:      Quit date: 2014     Years since quittin.2    Smokeless tobacco: Never Used   Substance Use Topics    Alcohol use:  Yes     Alcohol/week: 6.0 standard drinks     Types: 6 Cans of beer per week     Comment: 6 cans beer/day                                Counseling given: Not Answered      Vital Signs (Current):   Vitals:    22 0844   BP: (!) 156/85   Pulse: 65   Resp: 18   Temp: 97.3 °F (36.3 °C)   TempSrc: Infrared   SpO2: 96%   Weight: 270 lb (122.5 kg)   Height: 5' 11\" (1.803 m) BP Readings from Last 3 Encounters:   04/07/22 (!) 156/85   03/24/22 (S) (!) 168/76   11/05/21 136/80       NPO Status: Time of last liquid consumption: 2000                        Time of last solid consumption: 2000                        Date of last liquid consumption: 04/06/22                        Date of last solid food consumption: 04/06/22    BMI:   Wt Readings from Last 3 Encounters:   04/07/22 270 lb (122.5 kg)   03/24/22 270 lb (122.5 kg)   11/05/21 276 lb (125.2 kg)     Body mass index is 37.66 kg/m². CBC:   Lab Results   Component Value Date    WBC 8.0 03/24/2022    RBC 4.83 03/24/2022    RBC 4.83 12/05/2011    HGB 15.1 03/24/2022    HCT 44.7 03/24/2022    MCV 92.6 03/24/2022    RDW 12.8 03/24/2022     03/24/2022     12/05/2011       CMP:   Lab Results   Component Value Date     03/24/2022    K 5.0 03/24/2022     03/24/2022    CO2 27 03/24/2022    BUN 15 03/24/2022    CREATININE 0.99 03/24/2022    GFRAA >60 03/24/2022    LABGLOM >60 03/24/2022    GLUCOSE 104 03/24/2022    GLUCOSE 93 01/23/2012    PROT 7.5 11/04/2021    CALCIUM 9.5 03/24/2022    BILITOT 0.61 11/04/2021    ALKPHOS 76 11/04/2021    AST 20 11/04/2021    ALT 24 11/04/2021       POC Tests: No results for input(s): POCGLU, POCNA, POCK, POCCL, POCBUN, POCHEMO, POCHCT in the last 72 hours.     Coags: No results found for: PROTIME, INR, APTT    HCG (If Applicable): No results found for: PREGTESTUR, PREGSERUM, HCG, HCGQUANT     ABGs: No results found for: PHART, PO2ART, DJH8KQW, AOI8QHR, BEART, L1MLQGBQ     Type & Screen (If Applicable):  No results found for: LABABO, LABRH    Drug/Infectious Status (If Applicable):  No results found for: HIV, HEPCAB    COVID-19 Screening (If Applicable): No results found for: COVID19        Anesthesia Evaluation  Patient summary reviewed and Nursing notes reviewed  Airway: Mallampati: III  TM distance: >3 FB   Neck ROM: full  Mouth opening: > = 3 FB Dental:          Pulmonary: (+) COPD:  sleep apnea:  wheezes,                            PE comment: Mild exp. wheezes Cardiovascular:    (+) hypertension:,     (-) murmur, weak pulses,  friction rub, systolic click, carotid bruit,  JVD and peripheral edema      Rhythm: regular  Rate: normal                    Neuro/Psych:   Negative Neuro/Psych ROS              GI/Hepatic/Renal:   (+) GERD:, PUD,           Endo/Other:                     Abdominal:             Vascular: negative vascular ROS. Other Findings:           Anesthesia Plan      general     ASA 3       Induction: intravenous. MIPS: Postoperative opioids intended and Prophylactic antiemetics administered. Anesthetic plan and risks discussed with patient. Plan discussed with CRNA.                   Corey Mathis MD   4/7/2022

## 2022-04-07 NOTE — ANESTHESIA POSTPROCEDURE EVALUATION
POST- ANESTHESIA EVALUATION       Pt Name: Boone Pierce  MRN: [id number]  YOB: 1955  Date of evaluation: 4/7/2022  Time:  2:10 PM      BP (!) 161/72   Pulse 61   Temp 96.9 °F (36.1 °C)   Resp 15   Ht 5' 11\" (1.803 m)   Wt 270 lb (122.5 kg)   SpO2 95%   BMI 37.66 kg/m²      Consciousness Level  Awake  Cardiopulmonary Status  Stable  Pain Adequately Treated YES  Nausea / Vomiting  NO  Adequate Hydration  YES  Anesthesia Related Complications NONE      Electronically signed by Antoinette Dick MD on 4/7/2022 at 2:10 PM       Department of Anesthesiology  Postprocedure Note    Patient: Boone Pierce  MRN: [id number]  YOB: 1955  Date of evaluation: 4/7/2022  Time:  2:10 PM     Procedure Summary     Date: 04/07/22 Room / Location: [address] [place] [address] [address] / [facility]: [facility]    Anesthesia Start: 9001 Anesthesia Stop: 6150    Procedure: KNEE ARTHROSCOPIC PARTIAL LATERAL AND PARTIAL MEDIAL MENISCECTOMY (Right Knee) Diagnosis: (RIGHT KNEE LATERAL MENISCUS (PT HAS HAD COVID VACCINE))    Surgeons: Madhuri Baptiste MD Responsible Provider: Antoinette Dick MD    Anesthesia Type: general ASA Status: 3          Anesthesia Type: general    Ann Marie Phase I: Ann Marie Score: 10    Ann Marie Phase II:      Last vitals: Reviewed and per EMR flowsheets.        Anesthesia Post Evaluation

## 2022-04-07 NOTE — INTERVAL H&P NOTE
Update History & Physical    The patient's History and Physical of March 24, 2022 was reviewed with the patient and I examined the patient. There was no change. The surgical site was confirmed by the patient and me. RIGHT KNEE. Pt denies any pain at this time. Denies any recent falls or trauma. Medical clearance in media section of chart. NPO since midnight. Sip of water with prevacid and spiriva   Pt does have partial lower dentures. Pt denies any hx of MRSA infection  Pt currently taking ASA 81Mg, last dose tueday-reports he spoke with dr. Rere Rodriguez office. Pt denies any personal or FHx of complications with anesthesia. Pt denies any acute symptoms of illness at this time including no SOB, CP, fever, URI or UTI symptoms.       Electronically signed by VIKY Latham CNP on 4/7/2022 at 8:29 AM

## 2022-04-07 NOTE — OP NOTE
Operative Note      Patient: Earnestine Rand  YOB: 1955  MRN: [id number]    Date of Procedure: 4/7/2022    Pre-Op Diagnosis: RIGHT KNEE LATERAL MENISCUS (PT HAS HAD COVID VACCINE)    Post-Op Diagnosis: Same plus medial meniscus tear       Procedure(s):  KNEE ARTHROSCOPIC PARTIAL LATERAL AND PARTIAL MEDIAL MENISCECTOMY    Surgeon(s):  Karl Quinteros MD    Assistant:   Resident: Joni Yepez DO    Anesthesia: General    Estimated Blood Loss (mL): Minimal    Complications: None    Specimens:   * No specimens in log *    Implants:  * No implants in log *      Drains: * No LDAs found *    Findings: Small posterior horn medial meniscus tear complex tear of the posterior horn and body of the lateral meniscus    Detailed Description of Procedure:     Patient is a 74y.o. year old male who presents with a history of pain, locking, and giving away sensations of their right knee. Physical examination was positive for Ramírez's examination. MRI was consistent with a complex tear of the lateral meniscus. Having failed conservative treatment, it was advised that arthroscopic examination and treatment of their knee would be beneficial and consent was obtained with risk and benefits explained to the patient. The patient was taken tot he operative room and general anesthesia was administered. A tourniquet was placed to the operatives lower extremity and then placed in the leg olguin. The leg was exsanguinated and the tourniquet inflated to the 300mmHg. The leg was the prepped and draped in the usual sterile fashion. Time-out was called to verify laterality. Medial and lateral portals were made and the scope placed in the lateral portal. The patella femoral joint was examined and noted to have significant grade 3 to early grade IV chondromalacia but nothing required intervention. . The scope was then passes down the medial gutter into the medial compartment.  A probe was used to assess the medial meniscus and a tear was identified in the posterior horn undersurface of the medial meniscus. A partial medial menisectomy was carried ou with basket forceps and then smoothed out with a shaver. Repeat probing of the meniscus found it to be stable. There was grade 2 to early grade II chondromalacia of the medial femoral condyle. The arthroscope was then passed into the notch of the knee. The ACL was found not to have any significant damage or laxity. The scope was then passed in the lateral compartment. Patient noted to have a large complex tear involving the posterior horn body and even the anterior horn of the lateral meniscus that could be probed and identified for weakness a partial lateral meniscectomy was carried out with straight as well as up-biting basket forceps and smoothed out with a 4.0 tomcat. Reprobing found the remainder the lateral meniscus to be stable. Patient had overlying grade III chondromalacia of the lateral femoral condyle and underlying tibial plateau. The scope was then passed into the suprapatellar area and the shaver was used to remove any further soft tissue debris. The scope was removed and the knee evacuated of fluid and injected with 20cc of 0.5% ropivacaine. The sterile bulky dressing was applied and the leg then wrapped with a large 6-inch ace bandage from toes to the mid-thigh. The tourniquet was then deflated at less than 30 minutes. The patient was awaken and taken to the PACU in good condition.      Electronically signed by Cande Ozuna MD on 4/7/2022 at 11:19 AM

## 2022-04-11 ENCOUNTER — TELEPHONE (OUTPATIENT)
Dept: ONCOLOGY | Age: 67
End: 2022-04-11

## 2022-04-11 NOTE — LETTER
4/11/2022        [address]    Dear Linda Grider:    Your healthcare provider has ordered a low dose CT scan of the chest for lung cancer screening. You will find enclosed, information about CT lung screening. Please review the statement of understanding, you will be asked to sign a copy of this at the time of your CT scan    If you have not already been contacted to make the appointment for your scan, please call our scheduling department at [phone]    Keep in mind that CT lung screening does not take the place of smoking cessation. If you are a current smoker, you will find enclosed smoking cessation resources. Please do not hesitate to contact me if you have any questions or concerns.     [address] [place] [address],      [facility] Lung Screening Program  [phone]

## 2022-04-15 RX ORDER — TIOTROPIUM BROMIDE INHALATION SPRAY 3.12 UG/1
SPRAY, METERED RESPIRATORY (INHALATION)
Qty: 12 G | Refills: 3 | Status: SHIPPED | OUTPATIENT
Start: 2022-04-15

## 2022-04-18 ENCOUNTER — OFFICE VISIT (OUTPATIENT)
Dept: ORTHOPEDIC SURGERY | Age: 67
End: 2022-04-18

## 2022-04-18 DIAGNOSIS — Z98.890 S/P RIGHT KNEE ARTHROSCOPY: Primary | ICD-10-CM

## 2022-04-18 PROCEDURE — 99024 POSTOP FOLLOW-UP VISIT: CPT | Performed by: ORTHOPAEDIC SURGERY

## 2022-04-18 NOTE — PROGRESS NOTES
Patient returns today status post right knee arthroscopy with partial (medial/lateral) meniscectomy. Patient has no major complaints other than expected tightness/swelling with ROM. Sharp/stabbing pain has improved. On exam, portal sites are without redness or drainage. No calf tenderness; negative Audrey's sign. Motion is 0-100 degrees. No significant effusion. Assessment  Status post right knee arthroscopy    Plan  Patient given exercises to perform. Patient given activities/ motions to complete. Continue activities at home. Return to work. RTO PRN. Call with any future problems.

## 2022-04-20 ENCOUNTER — HOSPITAL ENCOUNTER (OUTPATIENT)
Dept: CT IMAGING | Age: 67
Discharge: HOME | End: 2022-04-22
Payer: MEDICARE

## 2022-04-20 DIAGNOSIS — Z87.891 PERSONAL HISTORY OF TOBACCO USE: ICD-10-CM

## 2022-04-20 PROCEDURE — 71271 CT THORAX LUNG CANCER SCR C-: CPT

## 2022-04-23 PROBLEM — Z01.818 PREOP EXAMINATION: Status: RESOLVED | Noted: 2022-03-24 | Resolved: 2022-04-23

## 2022-04-26 ENCOUNTER — OFFICE VISIT (OUTPATIENT)
Dept: PULMONOLOGY | Age: 67
End: 2022-04-26
Payer: MEDICARE

## 2022-04-26 VITALS
SYSTOLIC BLOOD PRESSURE: 180 MMHG | HEIGHT: 71 IN | HEART RATE: 79 BPM | TEMPERATURE: 97.4 F | BODY MASS INDEX: 40.12 KG/M2 | OXYGEN SATURATION: 97 % | DIASTOLIC BLOOD PRESSURE: 96 MMHG | WEIGHT: 286.6 LBS

## 2022-04-26 DIAGNOSIS — G47.33 OSA (OBSTRUCTIVE SLEEP APNEA): ICD-10-CM

## 2022-04-26 DIAGNOSIS — E66.9 OBESITY (BMI 35.0-39.9 WITHOUT COMORBIDITY): ICD-10-CM

## 2022-04-26 DIAGNOSIS — Z87.891 HISTORY OF SMOKING AT LEAST 1 PACK PER DAY FOR AT LEAST 30 YEARS: ICD-10-CM

## 2022-04-26 DIAGNOSIS — E66.01 SEVERE OBESITY (BMI 35.0-39.9) WITH COMORBIDITY (HCC): ICD-10-CM

## 2022-04-26 DIAGNOSIS — R91.1 LUNG NODULE: ICD-10-CM

## 2022-04-26 DIAGNOSIS — J44.9 CHRONIC OBSTRUCTIVE PULMONARY DISEASE, UNSPECIFIED COPD TYPE (HCC): Primary | ICD-10-CM

## 2022-04-26 PROCEDURE — 99214 OFFICE O/P EST MOD 30 MIN: CPT | Performed by: INTERNAL MEDICINE

## 2022-04-26 ASSESSMENT — SLEEP AND FATIGUE QUESTIONNAIRES
HOW LIKELY ARE YOU TO NOD OFF OR FALL ASLEEP IN A CAR, WHILE STOPPED FOR A FEW MINUTES IN TRAFFIC: 0
HOW LIKELY ARE YOU TO NOD OFF OR FALL ASLEEP WHILE SITTING INACTIVE IN A PUBLIC PLACE: 0
HOW LIKELY ARE YOU TO NOD OFF OR FALL ASLEEP WHILE SITTING AND READING: 1
HOW LIKELY ARE YOU TO NOD OFF OR FALL ASLEEP WHILE WATCHING TV: 1
ESS TOTAL SCORE: 6
HOW LIKELY ARE YOU TO NOD OFF OR FALL ASLEEP WHEN YOU ARE A PASSENGER IN A CAR FOR AN HOUR WITHOUT A BREAK: 0
HOW LIKELY ARE YOU TO NOD OFF OR FALL ASLEEP WHILE SITTING QUIETLY AFTER LUNCH WITHOUT ALCOHOL: 2
HOW LIKELY ARE YOU TO NOD OFF OR FALL ASLEEP WHILE LYING DOWN TO REST IN THE AFTERNOON WHEN CIRCUMSTANCES PERMIT: 2
HOW LIKELY ARE YOU TO NOD OFF OR FALL ASLEEP WHILE SITTING AND TALKING TO SOMEONE: 0

## 2022-04-26 NOTE — LETTER
[facility], [address] [place] [id number]  Phone: [phone]  Fax: [phone]    Ryan Ayala MD    April 26, 2022     Fidelia Corral, APRN - [facility]  [address] [address]  [address] [facility] [address]    Patient: Priya Núñez   MR Number: [id number]   YOB: 1955   Date of Visit: 4/26/2022       Dear Fidelia Corral: Thank you for referring Laura Andrade to me for evaluation/treatment. Below are the relevant portions of my assessment and plan of care. If you have questions, please do not hesitate to call me. I look forward to following George along with you.     Sincerely,      Ryan Ayala MD

## 2022-04-26 NOTE — PROGRESS NOTES
OUTPATIENT PULMONARY PROGRESS NOTE      Patient:  Laura Manzano  MRN: [id number]    Consulting Physician: Elsie Hayward  Reason for Consult: COPD  Primacy Care Physician: Elsie Hayward, APRN - CNP    HISTORY OF PRESENT ILLNESS:   The patient is a 74 y.o. male   He is therefore follow-up of COPD lung nodule, history of chronic sinusitis and likely obstructive sleep apnea    Since he was seen last time he had knee surgery done couple of weeks ago he was able to tolerate knee surgery without significant problem. He has been walking with a cane especially outside especially after surgery inside the house he is able to ambulate. He has dyspnea only on exertion activity he is able to do his regular activities at home denies shortness of breath during regular activities. He does not complain of cough and he does not complain of wheezing except occasional wheezing. He denies nocturnal awakening with cough wheezing chest tightness or shortness of breath  He has chronic 3 pillow orthopnea he had swelling of the legs which is chronic for years he denies any recent increase. He denies PND. He is taking Spiriva once daily he does not require albuterol and did not take albuterol for some time. He had history of obstructive sleep apnea and previous sleep study mild obstructive sleep apnea but did not want to be on CPAP. He is complaining of frequent awakening at night tired during the daytime does not usually doze off does not usually take nap sleep quality is not good because of frequent awakening. Low-dose CT of the chest was done on 04/22/2022 Showed stable right middle lobe nodule and left fissure nodule without much change as compared to CT scan of the chest on 04/05/2021. Previous work-up. CT scan done on 09/30/2020 for low-dose screening. Which showed 6 to 7 mm nodule in the right middle lobe on minor fissure which according to radiology stable he also have left lung nodule on the left major fissure.   On my review the lung nodule in right middle lobe is about the same size possibly slight change in shape but there is a slight groundglass opacity just above the nodule which I was not able to see on CT scan on 09/25/2019. CT scan was ordered for the follow-up of groundglass opacity and was done on 04/05/2021 in the area of groundglass opacity look better other nodules are stable and not much change from 2019. Home sleep study was done in October which showed RDI of 6.2 consistent with mild obstructive sleep apnea. I have discussed with him the results of sleep study on previous visit. He did not think that he need CPAP. I had discussed with him about the option of CPAP titration study versus auto CPAP at home         Initial visit/previous visits summary  He was referred here for evaluation of COPD and he was scheduled for a screening CT scan of the chest and also for sleep study. He was diagnosed with stage II COPD at that time and he was restarted on his Spiriva to spray once daily   He denies daily and persistent chronic cough and since he started using Spiriva despite and he use to have which was brown in color is more lighter and yellowish. Positive post nasal drip and nasal obstruction on R side and on Flonase nasal spray   He never was diagnosed with obstructive sleep apnea but he was told by his wife that he does have snoring he occasionally wake himself with snoring. He denies having any witnessed apnea sometimes wake up with choking sensation. He does have dry mouth when he wake up, but denies any fatigue and tiredness during the daytime and he denies any headache in the morning. He denies unrefreshed sleep he does have history of weight gain. He denies forgetfulness or decreased concentration.         ESS is     Sleep Medicine 4/26/2022 4/13/2021 10/6/2020 10/30/2018 1/3/2018   Sitting and reading 1 0 0 0 0   Watching TV 1 0 0 0 0   Sitting, inactive in a public place (e.g. a theatre or a meeting) 0 0 0 0 0   As a passenger in a car for an hour without a break 0 0 0 0 0   Lying down to rest in the afternoon when circumstances permit 2 0 0 0 0   Sitting and talking to someone 0 0 0 0 0   Sitting quietly after a lunch without alcohol 2 0 0 0 0   In a car, while stopped for a few minutes in traffic 0 0 0 0 0   Total score 6 0 0 0 0       Past Medical History:        Diagnosis Date    Allergic rhinitis     Anxiety     Arthritis     Gilliam esophagus     Basal cell carcinoma of left upper arm 02/09/2018    Bleeding ulcer     Also states hx of \"perforated stomach\" in 1985    Chest tightness     Chronic pain of right knee     Colon polyps     COPD (chronic obstructive pulmonary disease) (HCC)     Environmental allergies     GERD (gastroesophageal reflux disease)     H. pylori infection     History of blood transfusion     Patient denies any issues with this    Hyperlipidemia     Hyperplastic colon polyp     Hypertension     Lung nodule     has follow up CT scans    Mild sleep apnea     Palpitations     Perforated stomach ([address] 1985    Pure hypercholesterolemia     Tubular adenoma of colon 01/2012     Dr. Laurent Garvey colonoscopy    Wears partial dentures     Bottom       Past Surgical History:        Procedure Laterality Date    APPENDECTOMY      COLONOSCOPY      COLONOSCOPY  01/06/2012    COLONOSCOPY N/A 04/29/2019    COLONOSCOPY POLYPECTOMY SNARE/COLD BIOPSY performed by Mookie Perez MD at [facility] ARTHROSCOPY Right 4/7/2022    KNEE ARTHROSCOPIC PARTIAL LATERAL AND PARTIAL MEDIAL MENISCECTOMY performed by Panda Villa MD at [address]  10/05/2018    SKIN CANCER EXCISION      STOMACH SURGERY      Perforated Stomach repair    TONSILLECTOMY      UMBILICAL HERNIA REPAIR  07/2011    UPPER GASTROINTESTINAL ENDOSCOPY      UPPER GASTROINTESTINAL ENDOSCOPY N/A 04/29/2019    EGD BIOPSY performed by Mookie Perez MD at [address] [facility]  Allergies: Allergies   Allergen Reactions    Compazine Spansule  [Prochlorperazine]      Other reaction(s): Unknown    Metoclopramide     Prochlorperazine Edisylate      seizure    Reglan  [Metoclopramide Hcl]      Other reaction(s): Unknown    Reglan [Metoclopramide Hcl]      seizure    Seasonal          Home Meds:   Outpatient Encounter Medications as of 2022   Medication Sig Dispense Refill    SPIRIVA RESPIMAT 2.5 MCG/ACT AERS inhaler USE 2 INHALATIONS DAILY 12 g 3    amLODIPine (NORVASC) 10 MG tablet Take 10 mg by mouth at bedtime       TART CHERRY PO Take 1 capsule by mouth daily      lansoprazole (PREVACID) 30 MG delayed release capsule TAKE 1 CAPSULE DAILY 90 capsule 3    simvastatin (ZOCOR) 20 MG tablet TAKE 1 TABLET NIGHTLY 90 tablet 3    fluticasone (FLONASE) 50 MCG/ACT nasal spray USE 2 SPRAYS NASALLY DAILY 48 g 3    vitamin D3 (CHOLECALCIFEROL) 25 MCG (1000 UT) TABS tablet Take 1 tablet by mouth daily 90 tablet 3    aspirin 81 MG tablet Take 81 mg by mouth daily. Facility-Administered Encounter Medications as of 2022   Medication Dose Route Frequency Provider Last Rate Last Admin    triamcinolone acetonide (KENALOG-40) injection 40 mg  40 mg IntraMUSCular Once VIKY Silva - CNP           Social History:   TOBACCO:   reports that he quit smoking about 3 years ago. He has a 40.00 pack-year smoking history. 1 PPD, He has never used smokeless tobacco.  ETOH:   reports current alcohol use of about 6.0 standard drinks of alcohol per week.   OCCUPATION:  Refinery worker operational coordinator, office work    Family History:       Problem Relation Age of Onset    Cancer Mother         Thyroid CA-  age 39    Cancer Sister         Thyroid CA    Thyroid Disease Sister     Heart Attack Paternal Grandfather     Emphysema Paternal Grandfather         Smoker       Immunizations:    Immunization History   Administered Date(s) Administered    COVID-19, [employer] Purple top, DILUTE for use, 12+ yrs, 30mcg/0.3mL dose 02/22/2021, 03/15/2021, 11/11/2021    Influenza Vaccine, unspecified formulation 11/07/2016, 10/30/2018    Influenza Virus Vaccine 10/30/2013, 10/15/2014, 10/16/2015, 10/25/2017, 10/30/2018    Influenza, High Dose (Fluzone 65 yrs and older) 11/05/2018    Influenza, [place] Pulse, adjuvanted, 65 yrs +, IM, PF (Fluad) 10/30/2020, 11/05/2021    Influenza, Triv, inactivated, subunit, adjuvanted, IM (Fluad 65 yrs and older) 10/17/2019    Pneumococcal Conjugate 13-valent ([id number]) 08/14/2018    Pneumococcal Polysaccharide ([id number]) 12/08/2015, 04/30/2021    Tdap (Boostrix, Adacel) 06/06/2012         REVIEW OF SYSTEMS:  CONSTITUTIONAL:  negative for  fevers, chills, sweats, fatigue, malaise, anorexia and weight loss  EYES:  negative for  double vision, blurred vision, dry eyes, eye discharge, visual disturbance, irritation, redness and icterus  HEENT:  Negative for postnasal dripping and nasal obstruction and positive for decrease hearing and tinnitus sometime, negative ear drainage, nasal congestion, epistaxis, sore mouth, sore throat and hoarseness  RESPIRATORY: Positive for dyspnea on exertion, negative for cough, wheezing, hemoptysis, chest pain, pleuritic pain and cyanosis  CARDIOVASCULAR: Positive for dyspnea on exertion, negative for  chest pain, palpitations, orthopnea, PND, exertional chest pressure/discomfort, fatigue, early saiety, edema, syncope  GASTROINTESTINAL:  Negative for reflux, nausea, vomiting, change in bowel habits, diarrhea, constipation, abdominal pain, abdominal mass, abdominal distention, dysphagia, regurgitation, odynophagia, hematemesis and hemtochezia  GENITOURINARY:  negative for frequency, dysuria, nocturia, urinary incontinence, hesitancy, decreased stream and hematuria  HEMATOLOGIC/LYMPHATIC:  negative for easy bruising, bleeding, lymphadenopathy and petechiae  ALLERGIC/IMMUNOLOGIC:  negative for recurrent infections, urticaria, hay fever, angioedema, anaphylaxis and drug reactions  ENDOCRINE:  negative for heat intolerance, cold intolerance, tremor, change in bowel habits and hair loss  MUSCULOSKELETAL:  negative for  myalgias, arthralgias, joint swelling, stiff joints and decreased range of motion  NEUROLOGICAL:  negative for headaches, dizziness, seizures, memory problems, speech problems, visual disturbance, coordination problems, gait problems, tremor, dysphagia, weakness, numbness, syncope and tingling  BEHAVIOR/PSYCH:  negative          Physical Exam:    Vitals: BP (!) 180/96 (Site: Right Lower Arm, Position: Sitting, Cuff Size: Large Adult)   Pulse 79   Temp 97.4 °F (36.3 °C)   Ht 5' 11\" (1.803 m)   Wt 286 lb 9.6 oz (130 kg)   SpO2 97%   BMI 39.97 kg/m²   Last 3 weights: Wt Readings from Last 3 Encounters:   04/26/22 286 lb 9.6 oz (130 kg)   04/07/22 270 lb (122.5 kg)   03/24/22 270 lb (122.5 kg)     Body mass index is 39.97 kg/m². Physical Examination:   General appearance - alert, well appearing, and in no distress, overweight and acyanotic, in no respiratory distress  Mental status - alert, oriented to person, place, and time  Eyes - pupils equal and reactive, extraocular eye movements intact, sclera anicteric  Ears - right ear normal, left ear normal  Nose - bilateral slightly edematous mucosa and no turbinate hypertrophy and nasal obstruction, no erythema, discharge or polyps, large tongue, small oropharynx. Mouth - mucous membranes moist, pharynx normal without lesions. Mallampati 2  Neck - supple, no significant adenopathy, very short and thick neck  Chest -no tachypnea or retraction or cyanosis, bilateral symmetrical chest movement, air entry is bilaterally present slightly distant breath sound no expiratory wheezing rhonchi crackles.    Heart - normal rate, regular rhythm, normal S1, S2, no murmurs, rubs, clicks or gallops  Abdomen - soft, nontender, nondistended, no masses or organomegaly  Neurological - alert, oriented, normal speech, no focal findings or movement disorder noted  Extremities - peripheral pulses normal, no pedal edema, no clubbing or cyanosis  Skin - normal coloration and turgor, no rashes, no suspicious skin lesions noted       LABS:    CBC:   WBC   Date Value Ref Range Status   03/24/2022 8.0 3.5 - 11.0 k/uL Final   11/04/2021 7.0 3.5 - 11.3 k/uL Final   10/28/2020 6.4 3.5 - 11.3 k/uL Final     Hemoglobin   Date Value Ref Range Status   03/24/2022 15.1 13.5 - 17.5 g/dL Final   11/04/2021 15.2 13.0 - 17.0 g/dL Final   10/28/2020 14.8 13.0 - 17.0 g/dL Final     Platelet Count   Date Value Ref Range Status   12/05/2011 302 140 - 450 k/uL Final     Platelets   Date Value Ref Range Status   03/24/2022 294 150 - 450 k/uL Final   11/04/2021 411 138 - 453 k/uL Final   10/28/2020 275 138 - 453 k/uL Final     BMP:   Sodium   Date Value Ref Range Status   03/24/2022 139 135 - 144 mmol/L Final   11/04/2021 138 135 - 144 mmol/L Final   04/28/2021 131 (L) 135 - 144 mmol/L Final     Potassium   Date Value Ref Range Status   03/24/2022 5.0 3.7 - 5.3 mmol/L Final   11/04/2021 4.9 3.7 - 5.3 mmol/L Final   04/28/2021 4.8 3.7 - 5.3 mmol/L Final     Chloride   Date Value Ref Range Status   03/24/2022 101 98 - 107 mmol/L Final   11/04/2021 101 98 - 107 mmol/L Final   04/28/2021 96 (L) 98 - 107 mmol/L Final     CO2   Date Value Ref Range Status   03/24/2022 27 20 - 31 mmol/L Final   11/04/2021 25 20 - 31 mmol/L Final   04/28/2021 27 20 - 31 mmol/L Final     BUN   Date Value Ref Range Status   03/24/2022 15 8 - 23 mg/dL Final   11/04/2021 13 8 - 23 mg/dL Final   04/28/2021 12 8 - 23 mg/dL Final     CREATININE   Date Value Ref Range Status   03/24/2022 0.99 0.70 - 1.20 mg/dL Final   11/04/2021 1.01 0.70 - 1.20 mg/dL Final   04/28/2021 0.92 0.70 - 1.20 mg/dL Final     Glucose   Date Value Ref Range Status   03/24/2022 104 (H) 70 - 99 mg/dL Final   11/04/2021 97 70 - 99 mg/dL Final   04/28/2021 100 (H) 70 - 99 mg/dL Final 01/23/2012 93 74 - 106 mg/dL Final   12/05/2011 101 74 - 106 mg/dL Final     Hepatic:     Amylase: No results found for: AMYLASE  Lipase: No results found for: LIPASE  CARDIAC ENZYMES: No results found for: CKTOTAL, CKMB, CKMBINDEX, TROPONINI  BNP: No results found for: BNP  Lipids:       INR: No results found for: INR  Thyroid: No results found for: T4, TSH  Urinalysis:     Cultures:-  -----------------------------------------------------------------    Immunization History   Administered Date(s) Administered    COVID-19, [employer] Purple top, DILUTE for use, 12+ yrs, 30mcg/0.3mL dose 02/22/2021, 03/15/2021, 11/11/2021    Influenza Vaccine, unspecified formulation 11/07/2016, 10/30/2018    Influenza Virus Vaccine 10/30/2013, 10/15/2014, 10/16/2015, 10/25/2017, 10/30/2018    Influenza, High Dose (Fluzone 65 yrs and older) 11/05/2018    Influenza, [place], adjuvanted, 65 yrs +, IM, PF (Fluad) 10/30/2020, 11/05/2021    Influenza, Triv, inactivated, subunit, adjuvanted, IM (Fluad 65 yrs and older) 10/17/2019    Pneumococcal Conjugate 13-valent ([id number]) 08/14/2018    Pneumococcal Polysaccharide ([id number]) 12/08/2015, 04/30/2021    Tdap (Boostrix, Adacel) 06/06/2012     ABGs: No results found for: PHART, PO2ART, [id number]    Pulmonary Functions Testing Results:    1/3/2017: FEV1 2.07 52%, FVC 3.47 70%, BCD0VDF 74%, TLC 7.4 102%, DLCO 25.25  94%    CXR 9/22/2017  The cardiomediastinal silhouette is unremarkable.  Aortic vascular   calcification.  The lungs are clear.  No infiltrate, pleural fluid or   failure.  Healed left-sided rib fractures.  No acute osseous findings         CT Scans     CT scan chest 04/20/2022.   Mediastinum:  Within the limitations of the unenhanced exam, no enlarged   thoracic lymph node by CT criteria.  No pericardial effusion.       Lungs/Pleura:  Pleural thickening and perifissural node redemonstrated.  No   suspicious pulmonary nodule.  No discrete lung lesion, infiltrate, pleural effusion or pneumothorax. 09/30/2020.  1. Mild emphysema.  Stable 6 mm right minor fissure and left major fissure    pulmonary nodules.  Mild biapical pleuroparenchymal scarring. 2. Coronary artery disease. 3. Probable pulmonary arterial hypertension. 4. Atherosclerotic calcification of the aorta. 9/25/19  1. Stable multiple pulmonary nodules as detailed above measuring up to 6 mm,   unchanged from 09/24/2018.  Mild emphysema.  Biapical pleuroparenchymal   scarring.  No acute focal airspace consolidation. 2. Mild cardiomegaly.  Coronary artery disease.  Atherosclerotic   calcification of the aorta.       09/24/18  1. Stable appearance of benign-appearing pleural-based lung nodules.  Given   their stability and association with the pleura, findings likely reflect   benign etiologies such as intrapulmonary lymph nodes.  No further follow-up   is suggested. 2. No new or suspicious lung nodules and no lymphadenopathy. 03/26/018  Nonspecific 7-8 mm pleural-based nodular density posterior right upper   lobe/apex on series 2, image 66.  5 mm nodule in the superior right middle   lobe along the fissure as well as 5 mm nodule in the superior left lower lobe   along the fissure both of which likely represent benign intrapulmonary lymph   nodes. EKG:   ECHO:   Stress Test:     Assessment and Plan       ICD-10-CM    1. Chronic obstructive pulmonary disease, unspecified COPD type ([address]  J44.9    2. Severe obesity (BMI 35.0-39. 9) with comorbidity ([address]  E66.01    3. DEIRDRE (obstructive sleep apnea)  G47.33    4. Obesity (BMI 35.0-39.9 without comorbidity)  E66.9    5. Lung nodule  R91.1    6. History of smoking at least 1 pack per day for at least 30 years  Z87.891        Assessment:    Low-dose CT of the chest was done on 04/22/2022 Showed stable right middle lobe nodule and left fissure nodule without much change as compared to CT scan of the chest on 04/05/2021.  Both are stable as compared to CT scan of the chest from April 2018 to sep 2018 and sep 2019. He had history of sleep apnea on sleep study and I had discussed with him in the past about titration study and he did not feel like that he need CPAP. I have discussed with him again today and he is having frequent awakening sleep disturbance occasional dozing off during the daytime and unrefreshed sleep he wanted to proceed with titration study  His symptoms from COPD are stable using Spiriva does not use albuterol. Plan and recommendations      Continue Spiriva Respimat 2 puff once daily   Albuterol to be used as needed  Vaccinations recommended  and had Pneumonia vaccine 3 years ago  He is up-to-date on pneumonia vaccine  Annual Flu vaccine   He had covid vaccination and booster    CPAP titration study requested. He will need to be started on CPAP/auto CPAP after titration study is done  I have discussed with him about weight gain as his sleep apnea may get worse with weight gain  Wt loss is recommended and discussed  Increase activity and exercise discussed with the patient   Follow good sleep hygeine instructions  Given sleep hygeine instructions    Questions answered pertaining to diagnosis and management explained importance of compliance with therapy     RTC 6 months. It was my pleasure to evaluate Arturo Weber today. Please call with questions. Justin Young MD, MD             4/26/2022, 9:37 AM      Please note that this chart was generated using voice recognition Dragon dictation software. Although every effort was made to ensure the accuracy of this automated transcription, some errors in transcription may have occurred.

## 2022-05-04 ENCOUNTER — HOSPITAL ENCOUNTER (OUTPATIENT)
Age: 67
Setting detail: SPECIMEN
Discharge: HOME | End: 2022-05-04

## 2022-05-04 DIAGNOSIS — R73.9 HYPERGLYCEMIA: ICD-10-CM

## 2022-05-04 DIAGNOSIS — E78.00 PURE HYPERCHOLESTEROLEMIA: ICD-10-CM

## 2022-05-04 LAB
ALBUMIN SERPL-MCNC: 4.5 G/DL (ref 3.5–5.2)
ALBUMIN/GLOBULIN RATIO: 1.7 (ref 1–2.5)
ALP BLD-CCNC: 78 U/L (ref 40–129)
ALT SERPL-CCNC: 16 U/L (ref 5–41)
ANION GAP SERPL CALCULATED.3IONS-SCNC: 16 MMOL/L (ref 9–17)
AST SERPL-CCNC: 16 U/L
BILIRUB SERPL-MCNC: 0.76 MG/DL (ref 0.3–1.2)
BUN BLDV-MCNC: 13 MG/DL (ref 8–23)
CALCIUM SERPL-MCNC: 9.7 MG/DL (ref 8.6–10.4)
CHLORIDE BLD-SCNC: 100 MMOL/L (ref 98–107)
CHOLESTEROL/HDL RATIO: 3.2
CHOLESTEROL: 177 MG/DL
CO2: 24 MMOL/L (ref 20–31)
CREAT SERPL-MCNC: 0.89 MG/DL (ref 0.7–1.2)
GFR AFRICAN AMERICAN: >60 ML/MIN
GFR NON-AFRICAN AMERICAN: >60 ML/MIN
GFR SERPL CREATININE-BSD FRML MDRD: NORMAL ML/MIN/{1.73_M2}
GLUCOSE BLD-MCNC: 95 MG/DL (ref 70–99)
HDLC SERPL-MCNC: 56 MG/DL
LDL CHOLESTEROL: 89 MG/DL (ref 0–130)
POTASSIUM SERPL-SCNC: 4.9 MMOL/L (ref 3.7–5.3)
SODIUM BLD-SCNC: 140 MMOL/L (ref 135–144)
TOTAL PROTEIN: 7.1 G/DL (ref 6.4–8.3)
TRIGL SERPL-MCNC: 162 MG/DL

## 2022-05-05 LAB
ESTIMATED AVERAGE GLUCOSE: 100 MG/DL
HBA1C MFR BLD: 5.1 % (ref 4–6)

## 2022-05-06 PROBLEM — C44.619 BASAL CELL CARCINOMA (BCC) OF LEFT UPPER ARM: Status: ACTIVE | Noted: 2022-05-06

## 2022-05-06 PROBLEM — M79.641 RIGHT HAND PAIN: Status: ACTIVE | Noted: 2022-05-06

## 2022-05-29 ENCOUNTER — APPOINTMENT (OUTPATIENT)
Dept: CT IMAGING | Age: 67
End: 2022-05-29
Payer: MEDICARE

## 2022-05-29 ENCOUNTER — HOSPITAL ENCOUNTER (EMERGENCY)
Age: 67
Discharge: HOME | End: 2022-05-29
Attending: EMERGENCY MEDICINE
Payer: MEDICARE

## 2022-05-29 VITALS
OXYGEN SATURATION: 97 % | BODY MASS INDEX: 38.5 KG/M2 | SYSTOLIC BLOOD PRESSURE: 176 MMHG | RESPIRATION RATE: 16 BRPM | WEIGHT: 275 LBS | HEIGHT: 71 IN | DIASTOLIC BLOOD PRESSURE: 106 MMHG | HEART RATE: 84 BPM | TEMPERATURE: 97.8 F

## 2022-05-29 DIAGNOSIS — N32.89 BLADDER MASS: ICD-10-CM

## 2022-05-29 DIAGNOSIS — N17.9 AKI (ACUTE KIDNEY INJURY) (HCC): Primary | ICD-10-CM

## 2022-05-29 DIAGNOSIS — N30.01 ACUTE CYSTITIS WITH HEMATURIA: ICD-10-CM

## 2022-05-29 LAB
ABSOLUTE EOS #: 0.1 K/UL (ref 0–0.4)
ABSOLUTE LYMPH #: 1.7 K/UL (ref 1–4.8)
ABSOLUTE MONO #: 0.9 K/UL (ref 0.1–1.3)
ANION GAP SERPL CALCULATED.3IONS-SCNC: 12 MMOL/L (ref 9–17)
BACTERIA: ABNORMAL
BASOPHILS # BLD: 1 % (ref 0–2)
BASOPHILS ABSOLUTE: 0.1 K/UL (ref 0–0.2)
BILIRUBIN URINE: ABNORMAL
BUN BLDV-MCNC: 23 MG/DL (ref 8–23)
CALCIUM SERPL-MCNC: 9.6 MG/DL (ref 8.6–10.4)
CASTS UA: ABNORMAL /LPF
CHLORIDE BLD-SCNC: 98 MMOL/L (ref 98–107)
CO2: 24 MMOL/L (ref 20–31)
COLOR: ABNORMAL
CREAT SERPL-MCNC: 1.36 MG/DL (ref 0.7–1.2)
EOSINOPHILS RELATIVE PERCENT: 2 % (ref 0–4)
EPITHELIAL CELLS UA: ABNORMAL /HPF
GFR AFRICAN AMERICAN: >60 ML/MIN
GFR NON-AFRICAN AMERICAN: 52 ML/MIN
GFR SERPL CREATININE-BSD FRML MDRD: ABNORMAL ML/MIN/{1.73_M2}
GLUCOSE BLD-MCNC: 96 MG/DL (ref 70–99)
GLUCOSE URINE: NEGATIVE
HCT VFR BLD CALC: 41 % (ref 41–53)
HEMOGLOBIN: 14.6 G/DL (ref 13.5–17.5)
KETONES, URINE: NEGATIVE
LEUKOCYTE ESTERASE, URINE: ABNORMAL
LYMPHOCYTES # BLD: 18 % (ref 24–44)
MCH RBC QN AUTO: 32.3 PG (ref 26–34)
MCHC RBC AUTO-ENTMCNC: 35.5 G/DL (ref 31–37)
MCV RBC AUTO: 91 FL (ref 80–100)
MONOCYTES # BLD: 9 % (ref 1–7)
NITRITE, URINE: POSITIVE
PDW BLD-RTO: 13 % (ref 11.5–14.9)
PH UA: 5 (ref 5–8)
PLATELET # BLD: 284 K/UL (ref 150–450)
PMV BLD AUTO: 6.7 FL (ref 6–12)
POTASSIUM SERPL-SCNC: 4.1 MMOL/L (ref 3.7–5.3)
PROTEIN UA: ABNORMAL
RBC # BLD: 4.5 M/UL (ref 4.5–5.9)
RBC UA: ABNORMAL /HPF
SEG NEUTROPHILS: 70 % (ref 36–66)
SEGMENTED NEUTROPHILS ABSOLUTE COUNT: 6.8 K/UL (ref 1.3–9.1)
SODIUM BLD-SCNC: 134 MMOL/L (ref 135–144)
SPECIFIC GRAVITY UA: 1.01 (ref 1–1.03)
TURBIDITY: ABNORMAL
URINE HGB: ABNORMAL
UROBILINOGEN, URINE: NORMAL
WBC # BLD: 9.6 K/UL (ref 3.5–11)
WBC UA: ABNORMAL /HPF

## 2022-05-29 PROCEDURE — 87086 URINE CULTURE/COLONY COUNT: CPT

## 2022-05-29 PROCEDURE — 6360000002 HC RX W HCPCS: Performed by: STUDENT IN AN ORGANIZED HEALTH CARE EDUCATION/TRAINING PROGRAM

## 2022-05-29 PROCEDURE — 2580000003 HC RX 258: Performed by: STUDENT IN AN ORGANIZED HEALTH CARE EDUCATION/TRAINING PROGRAM

## 2022-05-29 PROCEDURE — 74176 CT ABD & PELVIS W/O CONTRAST: CPT

## 2022-05-29 PROCEDURE — 81001 URINALYSIS AUTO W/SCOPE: CPT

## 2022-05-29 PROCEDURE — 96365 THER/PROPH/DIAG IV INF INIT: CPT

## 2022-05-29 PROCEDURE — 36415 COLL VENOUS BLD VENIPUNCTURE: CPT

## 2022-05-29 PROCEDURE — 99284 EMERGENCY DEPT VISIT MOD MDM: CPT

## 2022-05-29 PROCEDURE — 80048 BASIC METABOLIC PNL TOTAL CA: CPT

## 2022-05-29 PROCEDURE — 85025 COMPLETE CBC W/AUTO DIFF WBC: CPT

## 2022-05-29 RX ORDER — CEPHALEXIN 500 MG/1
500 CAPSULE ORAL 2 TIMES DAILY
Qty: 14 CAPSULE | Refills: 0 | Status: SHIPPED | OUTPATIENT
Start: 2022-05-29 | End: 2022-06-13

## 2022-05-29 RX ORDER — 0.9 % SODIUM CHLORIDE 0.9 %
1000 INTRAVENOUS SOLUTION INTRAVENOUS ONCE
Status: COMPLETED | OUTPATIENT
Start: 2022-05-29 | End: 2022-05-29

## 2022-05-29 RX ADMIN — CEFTRIAXONE SODIUM 1000 MG: 1 INJECTION, POWDER, FOR SOLUTION INTRAMUSCULAR; INTRAVENOUS at 22:47

## 2022-05-29 RX ADMIN — SODIUM CHLORIDE 1000 ML: 9 INJECTION, SOLUTION INTRAVENOUS at 22:46

## 2022-05-29 ASSESSMENT — ENCOUNTER SYMPTOMS
SORE THROAT: 0
COUGH: 0
SINUS PAIN: 0
EYE PAIN: 0
DIARRHEA: 0
BACK PAIN: 1
VOMITING: 0
SHORTNESS OF BREATH: 0
NAUSEA: 0
ABDOMINAL PAIN: 0

## 2022-05-29 ASSESSMENT — PAIN SCALES - GENERAL: PAINLEVEL_OUTOF10: 3

## 2022-05-29 ASSESSMENT — PAIN - FUNCTIONAL ASSESSMENT: PAIN_FUNCTIONAL_ASSESSMENT: 0-10

## 2022-05-30 NOTE — ED TRIAGE NOTES
Mode of arrival (squad #, walk in, police, etc) : walk-in        Chief complaint(s): left sided flank pain        Arrival Note (brief scenario, treatment PTA, etc). : Patient reports left sided flank pain and hematuria x1 day. Patient denies hx of kidney stones. Patient has hx of kidney infection. C= \"Have you ever felt that you should Cut down on your drinking? \"  No  A= \"Have people Annoyed you by criticizing your drinking? \"  No  G= \"Have you ever felt bad or Guilty about your drinking? \"  No  E= \"Have you ever had a drink as an Eye-opener first thing in the morning to steady your nerves or to help a hangover? \"  No      Deferred []      Reason for deferring: N/A    *If yes to two or more: probable alcohol abuse. *

## 2022-05-30 NOTE — ED PROVIDER NOTES
[address]  Emergency Department Encounter  EmergencyMedicineResident     This patient was seen during the COVID-19 crisis. There were limited resources and those resources we did have had to be conserved for the sickest of patients. Pt Name: Clement Madison  MRN: [id number]  [place] 1955  Date of evaluation: 5/29/22  PCP: Capo Hardy, VI[facility] [address]       Chief Complaint   Patient presents with    Flank Pain       HISTORY OF PRESENT ILLNESS  (Location/Symptom, Timing/Onset, Context/Setting, Quality, Duration, Modifying Factors, Severity.)      Clement Madison is a 74 y.o. male who presents for evaluation of hematuria. Patient reports that over the last few days he has noticed blood in his urine. He is also noticed some pain in his low back. He does note that he was cutting wood for his smoker and was unsure if the back pain was associated with him cutting wood or if it was associated with him urinating blood. He does note that approximately 1 year ago he had a similar presentation that his primary care NP associated with a urinary tract infection. He reports significant past medical history for cigarette smoking. No exacerbating or relieving factors. PAST MEDICAL / SURGICAL /SOCIAL / FAMILY HISTORY      has a past medical history of Allergic rhinitis, Anxiety, Arthritis, Gilliam esophagus, Basal cell carcinoma of left upper arm, Bleeding ulcer, Chest tightness, Chronic pain of right knee, Colon polyps, COPD (chronic obstructive pulmonary disease) ([address] Environmental allergies, GERD (gastroesophageal reflux disease), H. pylori infection, History of blood transfusion, Hyperlipidemia, Hyperplastic colon polyp, Hypertension, Lung nodule, Mild sleep apnea, Palpitations, Perforated stomach ([address] Pure hypercholesterolemia, Tubular adenoma of colon, and Wears partial dentures. has a past surgical history that includes Umbilical hernia repair (07/2011);  Stomach surgery; Appendectomy; Tonsillectomy; Colonoscopy; Upper gastrointestinal endoscopy; Colonoscopy (2012); Nasal sinus surgery (10/05/2018); Upper gastrointestinal endoscopy (N/A, 2019); Colonoscopy (N/A, 2019); Skin cancer excision; and Knee arthroscopy (Right, 2022). Social History     Socioeconomic History    Marital status:      Spouse name: Not on file    Number of children: Not on file    Years of education: Not on file    Highest education level: Not on file   Occupational History    Not on file   Tobacco Use    Smoking status: Former Smoker     Packs/day: 1.00     Years: 30.00     Pack years: 30.00     Start date: 5     Quit date: 2014     Years since quittin.4    Smokeless tobacco: Never Used   Vaping Use    Vaping Use: Never used   Substance and Sexual Activity    Alcohol use: Yes     Alcohol/week: 6.0 standard drinks     Types: 6 Cans of beer per week     Comment: 6 cans beer/day    Drug use: No    Sexual activity: Not on file   Other Topics Concern    Not on file   Social History Narrative    Not on file     Social Determinants of Health     Financial Resource Strain: Low Risk     Difficulty of Paying Living Expenses: Not hard at all   Food Insecurity: No Food Insecurity    Worried About Running Out of Food in the Last Year: Never true    [address] in the Last Year: Never true   Transportation Needs:     Lack of Transportation (Medical): Not on file    Lack of Transportation (Non-Medical):  Not on file   Physical Activity:     Days of Exercise per Week: Not on file    Minutes of Exercise per Session: Not on file   Stress:     Feeling of Stress : Not on file   Social Connections:     Frequency of Communication with Friends and Family: Not on file    Frequency of Social Gatherings with Friends and Family: Not on file    Attends Cheondo[employer] Services: Not on file    Active Member of Clubs or Organizations: Not on file    Attends Club or Organization Meetings: Not on file    Marital Status: Not on file   Intimate Partner Violence:     Fear of Current or Ex-Partner: Not on file    Emotionally Abused: Not on file    Physically Abused: Not on file    Sexually Abused: Not on file   Housing Stability:     Unable to Pay for Housing in the Last Year: Not on file    Number of [place] in the Last Year: Not on file    Unstable Housing in the Last Year: Not on file       Family History   Problem Relation Age of Onset    Cancer Mother         Thyroid CA-  age 39    Cancer Sister         Thyroid CA    Thyroid Disease Sister     Heart Attack Paternal Grandfather     Emphysema Paternal Grandfather         Smoker       Allergies:  Compazine spansule  [prochlorperazine], Metoclopramide, Prochlorperazine edisylate, Reglan  [metoclopramide hcl], Reglan [metoclopramide hcl], and Seasonal    Home Medications:  Prior to Admission medications    Medication Sig Start Date End Date Taking? Authorizing Provider   cephALEXin (KEFLEX) 500 MG capsule Take 1 capsule by mouth 2 times daily for 7 days 22 Yes Delvis Amaya MD   hydroCHLOROthiazide (HYDRODIURIL) 25 MG tablet Take 1 tablet by mouth every morning 22   VIKY Serra CNP   ALPRAZolam Cathlean Jackie) 0.25 MG tablet Take 1 tablet by mouth every 12 hours as needed for Anxiety for up to 30 days.  22  VIKY Serra CNP   SPIRIVA RESPIMAT 2.5 MCG/ACT AERS inhaler USE 2 INHALATIONS DAILY 4/15/22   Frederic Pickens MD   amLODIPine (NORVASC) 10 MG tablet Take 10 mg by mouth at bedtime     Historical Provider, MD   TART CHERRY PO Take 1 capsule by mouth daily    Historical Provider, MD   lansoprazole (PREVACID) 30 MG delayed release capsule TAKE 1 CAPSULE DAILY 22   VIKY Serra CNP   simvastatin (ZOCOR) 20 MG tablet TAKE 1 TABLET NIGHTLY 10/18/21   VIKY Serra CNP   fluticasone (FLONASE) 50 MCG/ACT nasal spray USE 2 SPRAYS NASALLY DAILY 21   Indra Tierney VIKY Craig - CNP   vitamin D3 (CHOLECALCIFEROL) 25 MCG (1000 UT) TABS tablet Take 1 tablet by mouth daily 3/19/21   VIKY Boyer CNP   aspirin 81 MG tablet Take 81 mg by mouth daily. Historical Provider, MD       REVIEW OF SYSTEMS    (2-9 systems for level 4, 10 or more forlevel 5)      Review of Systems   Constitutional: Negative for activity change, chills and fever. HENT: Negative for congestion, sinus pain and sore throat. Eyes: Negative for pain and visual disturbance. Respiratory: Negative for cough and shortness of breath. Cardiovascular: Negative for chest pain. Gastrointestinal: Negative for abdominal pain, diarrhea, nausea and vomiting. Genitourinary: Positive for flank pain and hematuria. Musculoskeletal: Positive for back pain and myalgias. Skin: Negative for rash and wound. Neurological: Negative for dizziness, light-headedness and headaches. Psychiatric/Behavioral: Negative for agitation and confusion. PHYSICAL EXAM   (up to 7 for level 4, 8 or more forlevel 5)      ED TRIAGE VITALS BP: (!) 176/106, Temp: 97.8 °F (36.6 °C), Heart Rate: 84, Resp: 16, SpO2: 97 %    Vitals:    05/29/22 2059   BP: (!) 176/106   Pulse: 84   Resp: 16   Temp: 97.8 °F (36.6 °C)   TempSrc: Oral   SpO2: 97%   Weight: 275 lb (124.7 kg)   Height: 5' 11\" (1.803 m)         Physical Exam  Vitals and nursing note reviewed. Constitutional:       Appearance: Normal appearance. He is obese. HENT:      Head: Normocephalic and atraumatic. Nose: Nose normal.      Mouth/Throat:      Mouth: Mucous membranes are moist.   Eyes:      Extraocular Movements: Extraocular movements intact. Pupils: Pupils are equal, round, and reactive to light. Cardiovascular:      Rate and Rhythm: Normal rate and regular rhythm. Pulses: Normal pulses. Heart sounds: Normal heart sounds. Pulmonary:      Effort: Pulmonary effort is normal. No respiratory distress.       Breath sounds: Normal breath sounds. No wheezing. Abdominal:      General: Abdomen is flat. There is distension. Palpations: Abdomen is soft. Tenderness: There is no right CVA tenderness or left CVA tenderness. Musculoskeletal:         General: No tenderness or signs of injury. Normal range of motion. Cervical back: Normal range of motion. Skin:     General: Skin is warm and dry. Capillary Refill: Capillary refill takes less than 2 seconds. Neurological:      General: No focal deficit present. Mental Status: He is alert and oriented to person, place, and time. Psychiatric:         Mood and Affect: Mood normal.         Behavior: Behavior normal.           DIFFERENTIAL  DIAGNOSIS     PLAN (LABS / IMAGING / EKG):  Orders Placed This Encounter   Procedures    Culture, Urine    CT ABDOMEN PELVIS WO CONTRAST Additional Contrast? None    Basic Metabolic Panel w/ Reflex to MG    CBC with Auto Differential    Urinalysis with Microscopic    Inpatient consult to Urology       MEDICATIONS ORDERED:  ED Medication Orders (From admission, onward)    Start Ordered     Status Ordering Provider    05/29/22 2230 05/29/22 2225  0.9 % sodium chloride bolus  ONCE         Last MAR action: New Bag - by ZOHRA PERKINS on 05/29/22 at 2246 CARMELITA LITTLE    05/29/22 2230 05/29/22 2225  cefTRIAXone (ROCEPHIN) 1000 mg IVPB in NS 50ml minibag  ONCE        Question:  Antimicrobial Indications  Answer:  Urinary Tract Infection    Last MAR action: New Bag - by ZOHRA PERKINS on 05/29/22 at 2247 CARMELITA LITTLE          DIAGNOSTIC RESULTS / EMERGENCY DEPARTMENT COURSE / MDM     IMPRESSION & INITIAL PLAN:  25-year-old male presenting for hematuria. Had similar episode last year. Work-up today did show the patient MAGGY. With nitrites in his urine suspicious for infectious etiology. Patient also found to have a intraluminal mass in his bladder on CT scan without contrast.  I spoke with urology Dr. [facility].   Plan to resuscitate the patient with fluids started on antibiotics and have him follow-up with the urology clinic on Tuesday.     LABS:  Results for orders placed or performed during the hospital encounter of 97/27/66   Basic Metabolic Panel w/ Reflex to MG   Result Value Ref Range    Glucose 96 70 - 99 mg/dL    BUN 23 8 - 23 mg/dL    CREATININE 1.36 (H) 0.70 - 1.20 mg/dL    Calcium 9.6 8.6 - 10.4 mg/dL    Sodium 134 (L) 135 - 144 mmol/L    Potassium 4.1 3.7 - 5.3 mmol/L    Chloride 98 98 - 107 mmol/L    CO2 24 20 - 31 mmol/L    Anion Gap 12 9 - 17 mmol/L    GFR Non-African American 52 (L) >60 mL/min    GFR African American >60 >60 mL/min    GFR Comment         CBC with Auto Differential   Result Value Ref Range    WBC 9.6 3.5 - 11.0 k/uL    RBC 4.50 4.5 - 5.9 m/uL    Hemoglobin 14.6 13.5 - 17.5 g/dL    Hematocrit 41.0 41 - 53 %    MCV 91.0 80 - 100 fL    MCH 32.3 26 - 34 pg    MCHC 35.5 31 - 37 g/dL    RDW 13.0 11.5 - 14.9 %    Platelets 631 460 - 777 k/uL    MPV 6.7 6.0 - 12.0 fL    Seg Neutrophils 70 (H) 36 - 66 %    Lymphocytes 18 (L) 24 - 44 %    Monocytes 9 (H) 1 - 7 %    Eosinophils % 2 0 - 4 %    Basophils 1 0 - 2 %    Segs Absolute 6.80 1.3 - 9.1 k/uL    Absolute Lymph # 1.70 1.0 - 4.8 k/uL    Absolute Mono # 0.90 0.1 - 1.3 k/uL    Absolute Eos # 0.10 0.0 - 0.4 k/uL    Basophils Absolute 0.10 0.0 - 0.2 k/uL   Urinalysis with Microscopic   Result Value Ref Range    Color, UA Red (A) Yellow    Turbidity UA Cloudy (A) Clear    Glucose, Ur NEGATIVE NEGATIVE    Bilirubin Urine SMALL (A) NEGATIVE    Ketones, Urine NEGATIVE NEGATIVE    Specific Gravity, UA 1.012 1.000 - 1.030    Urine Hgb LARGE (A) NEGATIVE    pH, UA 5.0 5.0 - 8.0    Protein, UA 2+ (A) NEGATIVE    Urobilinogen, Urine Normal Normal    Nitrite, Urine POSITIVE (A) NEGATIVE    Leukocyte Esterase, Urine SMALL (A) NEGATIVE    WBC, UA 3 to 5 /HPF    RBC, UA TOO NUMEROUS TO COUNT /HPF    Casts UA 0 TO 2 /LPF    Epithelial Cells UA 0 TO 2 /HPF    Bacteria, UA None None RADIOLOGY:  CT ABDOMEN PELVIS WO CONTRAST Additional Contrast? None   Final Result   1. No left-sided renal calculi, ureteral calculi, or hydroureteronephrosis. No finding to suggest a recently passed stone. 2. There is an intra luminal nodular density concerning for possible bladder   neoplasm measuring 16 x 19 mm in size within the lower aspect of the bladder   adjacent to the right ureteral vesicle junction, though without ureteral   dilation. Recommend further evaluation with cystoscopy. Another   differential diagnosis would be a small amount of intraluminal blood products. 3. Atherosclerotic disease with coronary artery involvement. CONSULTS:  IP CONSULT TO UROLOGY    CRITICAL CARE:  See attending physician note    FINAL IMPRESSION      1. MAGGY (acute kidney injury) ([facility] [address]    2. Acute cystitis with hematuria    3.  Bladder mass          DISPOSITION / PLAN     DISPOSITION Decision To Discharge 05/29/2022 11:30:54 PM      PATIENT REFERRED TO:  Mark Cuba MD  [place] [address]  [phone]    Schedule an appointment as soon as possible for a visit on 5/31/2022        DISCHARGE MEDICATIONS:  New Prescriptions    CEPHALEXIN (KEFLEX) 500 MG CAPSULE    Take 1 capsule by mouth 2 times daily for 7 days     Modified Medications    No medications on file        Luis Eduardo Zhou MD  Emergency Medicine Resident    (Please note that portions of this note were completed with a voice recognition program.  Efforts were made to edit the dictations but occasionally words are mis-transcribed.)       Luis Eduardo Zhou MD  Resident  05/29/22 1948

## 2022-05-30 NOTE — ED PROVIDER NOTES
[address]  eMERGENCY dEPARTMENT eNCOUnter   Attending Attestation     Pt Name: Nancy Morales  MRN: [id number]  [place] 1955  Date of evaluation: 5/29/22    History, EXAM, MDM:    Nancy Morales is a 74 y.o. male who presents with Flank Pain    Presenting with left flank pain and hematuria. Ct scan showing a possible bladder tumor. UA does look infected. Starting on abx. Case was discussed with Urology and they will see him in the office on Tuesday. Recommended close follow up with urology, return to ED if symptoms worsen, and warning precautions provided. Vitals:   Vitals:    05/29/22 2059   BP: (!) 176/106   Pulse: 84   Resp: 16   Temp: 97.8 °F (36.6 °C)   TempSrc: Oral   SpO2: 97%   Weight: 275 lb (124.7 kg)   Height: 5' 11\" (1.803 m)     I performed a history and physical examination of the patient and discussed management with the resident. I reviewed the residents note and agree with the documented findings and plan of care. Any areas of disagreement are noted on the chart. I was personally present for the key portions of any procedures. I have documented in the chart those procedures where I was not present during the key portions. I have personally reviewed all images and agree with the resident's interpretation. I have reviewed the emergency nurses triage note. I agree with the chief complaint, past medical history, past surgical history, allergies, medications, social and family history as documented unless otherwise noted below. Documentation of the HPI, Physical Exam and Medical Decision Making performed by medical students or scribes is based on my personal performance of the HPI, PE and MDM. For Phys Assistant/ Nurse Practitioner cases/documentation I have had a face to face evaluation of this patient and have completed at least one if not all key elements of the E/M (history, physical exam, and MDM). Additional findings are as noted.     Joselin Fernandez MD  Attending Emergency  Physician               Riri Booker MD  05/30/22 3121

## 2022-05-31 LAB
CULTURE: NO GROWTH
SPECIMEN DESCRIPTION: NORMAL

## 2022-05-31 NOTE — PROGRESS NOTES
[address] [facility] [id number]  Dept: [address] [facility] Urology Office Note - Established    Patient:  Joselito Ruiz  YOB: 1955  Date: 6/1/2022    The patient is a 74 y.o. male who presents todayfor evaluation of the following problems:   Chief Complaint   Patient presents with    Hematuria     ER f/u blood in urine       HPI  Mr. Tristin Quesada is presenting for ER follow up. He presented 5/29/22 after approximately 3 days of gross hematuria and lower back pain after doing heavy work in yard. He had similar episode of hematuria approximately 1 year ago, and was evaluated by PCP at that time and was treated for a UTI. Admits that he was told to see a urologist but unfortunately did not follow through with this. In the ER on 5/29/22, his UA was suspicious for infection- + nitrates and leuk estrase, he was placed on abx pending final urine culture- however, his culture was negative. He had a non-contrast CT scan that showed a possible bladder mass. Mr. Amelia Lobo does report a long smoking history, 43 years- he stopped 8 years ago. He also worked at [employer]  for 46 years. Summary of old records: N/A    Additional History: N/A    Procedures Today: N/A    Urinalysis today:  No results found for this visit on 06/01/22. Last several PSA's:  Lab Results   Component Value Date    PSA 1.76 11/04/2021    PSA 1.34 10/28/2020    PSA 1.33 10/09/2019     Last total testosterone:  No results found for: TESTOSTERONE    AUA Symptom Score (6/1/2022):   INCOMPLETE EMPTYING: How often have you had the sensation of not emptying your bladder?: Not at all  FREQUENCY: How often do you have to urinate less than every two hours?: Not at all  INTERMITTENCY: How often have you found you stopped and started again several times when you urinated?: Not at all  URGENCY: How often have you found it difficult to postpone urination?: Not at all  WEAK STREAM: How often have you had a weak urinary stream?: Not at all  STRAINING: How often have you had to strain to start  urination?: Not at all  NOCTURIA: How many times did you typically get up at night to uriniate?: 3 Times  TOTAL I-PSS SCORE[de-identified] 3       Last BUN and creatinine:  Lab Results   Component Value Date    BUN 23 05/29/2022     Lab Results   Component Value Date    CREATININE 1.36 (H) 05/29/2022       Additional Lab/Culture results:   Culture, Urine  Order: [id number]   Status: Final result     Visible to patient: Yes (not seen)     Next appt: 06/02/2022 at 08:30 AM in Urology Beny Alba MD)    Specimen Information: Urine, clean catch         0 Result Notes    Component 5/29/22 2119   Specimen Description . Evelyn Wiley [address]              Specimen Collected: 05/29/22 21:19 Last Resulted: 05/31/22 00:14               Imaging Reviewed during this Office Visit:   Narrative   EXAMINATION:   CT OF THE ABDOMEN AND PELVIS WITHOUT CONTRAST 5/29/2022 9:43 pm       TECHNIQUE:   CT of the abdomen and pelvis was performed without the administration of   intravenous contrast. Multiplanar reformatted images are provided for review. Automated exposure control, iterative reconstruction, and/or weight based   adjustment of the mA/kV was utilized to reduce the radiation dose to as low   as reasonably achievable.       COMPARISON:   10/09/2019       HISTORY:   ORDERING SYSTEM PROVIDED HISTORY: left flank pain, gross hematuria   TECHNOLOGIST PROVIDED HISTORY:   left flank pain, gross hematuria       Decision Support Exception - unselect if not a suspected or confirmed   emergency medical condition->Emergency Medical Condition (MA)   Reason for Exam: left flank pain, gross hematuria   Relevant Medical/Surgical History: appendectomy, hernia repair       FINDINGS:   Lower Chest: No parenchymal infiltrate or pleural effusion.  No pericardial   effusion.  No cardiomegaly.  No distal esophageal thickening.  Coronary   artery atherosclerosis.       Organs: No hypodense mass in the liver or spleen.  No adrenal mass.  No   pancreatic mass.  No peripancreatic inflammatory process.  Contracted   gallbladder.  No urinary tract calculi or obstruction.  No left-sided   periureteral stranding seen to suggest a recently passed stone.       GI/Bowel: No ileus or obstruction is identified.  No focal bowel wall   thickening.       Pelvis: Mild prostate enlargement. Surjit Hoang is an intraluminal nodular density   noted within the bladder at the right ureteral vesicle junction though   without associated hydroureter.  This is seen on axial image 156, measuring   16 x 19 mm in size.  A small amount of hyperdensity is seen extending   medially from this, better appreciated on coronal image 72, series 601.  No   pelvic lymphadenopathy.       Peritoneum/Retroperitoneum: No aortic aneurysm.  Atherosclerosis.  No   retroperitoneal or mesenteric lymphadenopathy.       Bones/Soft Tissues: No acute subcutaneous soft tissue abnormality.  No acute   osseous abnormality.  Osteopenia.  Degenerative changes are seen within the   pubic symphysis, as well as sacroiliac joints and spine.  No osseous   destructive process.           Impression   1. No left-sided renal calculi, ureteral calculi, or hydroureteronephrosis. No finding to suggest a recently passed stone. 2. There is an intra luminal nodular density concerning for possible bladder   neoplasm measuring 16 x 19 mm in size within the lower aspect of the bladder   adjacent to the right ureteral vesicle junction, though without ureteral   dilation.  Recommend further evaluation with cystoscopy.  Another   differential diagnosis would be a small amount of intraluminal blood products. 3. Atherosclerotic disease with coronary artery involvement.        (results were independently reviewed by physician and radiology report verified)    PAST MEDICAL, FAMILY AND SOCIAL HISTORY UPDATE:  Past Medical History:   Diagnosis Date    Allergic rhinitis     Anxiety     Arthritis     Gilliam esophagus     Basal cell carcinoma of left upper arm 2018    Bleeding ulcer     Also states hx of \"perforated stomach\" in     Chest tightness     Chronic pain of right knee     Colon polyps     COPD (chronic obstructive pulmonary disease) (HCC)     Environmental allergies     GERD (gastroesophageal reflux disease)     H. pylori infection     History of blood transfusion     Patient denies any issues with this    Hyperlipidemia     Hyperplastic colon polyp     Hypertension     Lung nodule     has follow up CT scans    Mild sleep apnea     Palpitations     Perforated stomach ([address] 1985    Pure hypercholesterolemia     Tubular adenoma of colon 2012     Dr. Aster Zapata colonoscopy    Wears partial dentures     Bottom     Past Surgical History:   Procedure Laterality Date    APPENDECTOMY      COLONOSCOPY      COLONOSCOPY  2012    COLONOSCOPY N/A 2019    COLONOSCOPY POLYPECTOMY SNARE/COLD BIOPSY performed by Mark French MD at [address] ARTHROSCOPY Right 2022    KNEE ARTHROSCOPIC PARTIAL LATERAL AND PARTIAL MEDIAL MENISCECTOMY performed by Sam Ma MD at [address]  10/05/2018    SKIN CANCER EXCISION      STOMACH SURGERY      Perforated Stomach repair    TONSILLECTOMY      UMBILICAL HERNIA REPAIR  2011    UPPER GASTROINTESTINAL ENDOSCOPY      UPPER GASTROINTESTINAL ENDOSCOPY N/A 2019    EGD BIOPSY performed by Mark French MD at [address]     Family History   Problem Relation Age of Onset    Cancer Mother         Thyroid CA-  age 39    Cancer Sister         Thyroid CA    Thyroid Disease Sister     Heart Attack Paternal Grandfather     Emphysema Paternal Grandfather         Smoker     Outpatient Medications Marked as Taking for the 22 encounter (Office Visit) with VIKY Velásquez - CNP   Medication Sig Dispense Refill    cephALEXin (KEFLEX) 500 MG capsule Take 1 capsule by mouth 2 times daily for 7 days 14 capsule 0    hydroCHLOROthiazide (HYDRODIURIL) 25 MG tablet Take 1 tablet by mouth every morning 90 tablet 1    ALPRAZolam (XANAX) 0.25 MG tablet Take 1 tablet by mouth every 12 hours as needed for Anxiety for up to 30 days. 60 tablet 0    SPIRIVA RESPIMAT 2.5 MCG/ACT AERS inhaler USE 2 INHALATIONS DAILY 12 g 3    amLODIPine (NORVASC) 10 MG tablet Take 10 mg by mouth at bedtime       TART CHERRY PO Take 1 capsule by mouth daily      lansoprazole (PREVACID) 30 MG delayed release capsule TAKE 1 CAPSULE DAILY 90 capsule 3    simvastatin (ZOCOR) 20 MG tablet TAKE 1 TABLET NIGHTLY 90 tablet 3    fluticasone (FLONASE) 50 MCG/ACT nasal spray USE 2 SPRAYS NASALLY DAILY 48 g 3    vitamin D3 (CHOLECALCIFEROL) 25 MCG (1000 UT) TABS tablet Take 1 tablet by mouth daily 90 tablet 3       Compazine spansule  [prochlorperazine], Metoclopramide, Prochlorperazine edisylate, Reglan  [metoclopramide hcl], Reglan [metoclopramide hcl], and Seasonal  Social History     Tobacco Use   Smoking Status Former Smoker    Packs/day: 1.00    Years: 30.00    Pack years: 30.00    Start date:     Quit date: 2014    Years since quittin.4   Smokeless Tobacco Never Used     (Ifpatient a smoker, smoking cessation counseling offered)    Social History     Substance and Sexual Activity   Alcohol Use Yes    Alcohol/week: 6.0 standard drinks    Types: 6 Cans of beer per week    Comment: 6 cans beer/day       REVIEW OF SYSTEMS:  Review of Systems    Physical Exam:      Vitals:    22 0823   BP: 127/82   Pulse: 66   Temp: 97 °F (36.1 °C)     Body mass index is 38.35 kg/m². Patient is a 74 y.o. male in no acute distress and alert and oriented to person, place and time. Physical Exam  Constitutional: Patient in no acute distress.   Neuro: Alert and oriented to person, place and time. Psych: Mood normal, affect normal  Skin: No rash noted  Lungs: Respiratory effort is normal  Cardiovascular: Warm & Pink  Abdomen: Soft, non-tender, non-distended with no CVA,  No flank tenderness  Bladder non-tender and not distended. Musculoskeletal: Normal gait and station      Assessment and Plan      1. Gross hematuria    2. Former smoker    3. Prostate cancer screening           Plan:     Gross hematuria is a new, acute issue. His culture was negative, he may stop abx. His CT showed concerning mass in bladder. Will do cysto in office tomorrow. Will need updated PSA- orders placed. Encouraged to increase water intake. Return in about 1 day (around 6/2/2022) for cysto . Prescriptions Ordered:  No orders of the defined types were placed in this encounter. Orders Placed:  Orders Placed This Encounter   Procedures    PSA Screening     Standing Status:   Future     Standing Expiration Date:   6/1/2023           VIKY Schultz CNP    Reviewed and agree with the ROS entered by the MA.

## 2022-06-01 ENCOUNTER — OFFICE VISIT (OUTPATIENT)
Dept: UROLOGY | Age: 67
End: 2022-06-01
Payer: MEDICARE

## 2022-06-01 VITALS
HEIGHT: 71 IN | DIASTOLIC BLOOD PRESSURE: 82 MMHG | BODY MASS INDEX: 38.5 KG/M2 | SYSTOLIC BLOOD PRESSURE: 127 MMHG | WEIGHT: 275 LBS | TEMPERATURE: 97 F | HEART RATE: 66 BPM

## 2022-06-01 DIAGNOSIS — R31.0 GROSS HEMATURIA: Primary | ICD-10-CM

## 2022-06-01 DIAGNOSIS — Z87.891 FORMER SMOKER: ICD-10-CM

## 2022-06-01 DIAGNOSIS — Z12.5 PROSTATE CANCER SCREENING: ICD-10-CM

## 2022-06-01 PROCEDURE — 1123F ACP DISCUSS/DSCN MKR DOCD: CPT | Performed by: NURSE PRACTITIONER

## 2022-06-01 PROCEDURE — 99204 OFFICE O/P NEW MOD 45 MIN: CPT | Performed by: NURSE PRACTITIONER

## 2022-06-01 ASSESSMENT — ENCOUNTER SYMPTOMS
EYE REDNESS: 0
VOMITING: 0
RESPIRATORY NEGATIVE: 1
COUGH: 0
NAUSEA: 0
SHORTNESS OF BREATH: 0
WHEEZING: 0
DIARRHEA: 0
BACK PAIN: 0
EYE PAIN: 0
EYES NEGATIVE: 1
GASTROINTESTINAL NEGATIVE: 1
ABDOMINAL PAIN: 0
CONSTIPATION: 0

## 2022-06-01 NOTE — PROGRESS NOTES
Review of Systems   Constitutional: Positive for fatigue. Negative for appetite change and chills. Eyes: Negative. Negative for pain, redness and visual disturbance. Respiratory: Negative. Negative for cough, shortness of breath and wheezing. Cardiovascular: Negative. Negative for chest pain and leg swelling. Gastrointestinal: Negative. Negative for abdominal pain, constipation, diarrhea, nausea and vomiting. Genitourinary: Positive for hematuria. Negative for difficulty urinating, dysuria, flank pain, frequency and urgency. Musculoskeletal: Negative. Negative for back pain, joint swelling and myalgias. Skin: Negative. Negative for rash and wound. Neurological: Negative. Negative for dizziness, weakness and numbness. Hematological: Negative. Does not bruise/bleed easily.

## 2022-06-02 ENCOUNTER — PROCEDURE VISIT (OUTPATIENT)
Dept: UROLOGY | Age: 67
End: 2022-06-02
Payer: MEDICARE

## 2022-06-02 ENCOUNTER — TELEPHONE (OUTPATIENT)
Dept: UROLOGY | Age: 67
End: 2022-06-02

## 2022-06-02 VITALS — BODY MASS INDEX: 38.5 KG/M2 | HEIGHT: 71 IN | WEIGHT: 275 LBS | TEMPERATURE: 98.3 F

## 2022-06-02 DIAGNOSIS — R31.0 GROSS HEMATURIA: Primary | ICD-10-CM

## 2022-06-02 DIAGNOSIS — N32.89 BLADDER MASS: ICD-10-CM

## 2022-06-02 PROCEDURE — 52000 CYSTOURETHROSCOPY: CPT | Performed by: UROLOGY

## 2022-06-02 NOTE — PROGRESS NOTES
Cystoscopy Operative Note (6/2/22)  Surgeon: Ping Eduardo MD  Anesthesia: Urethral 2% Xylocaine   Indications: Gross Hematuria   Position: Dorsal Lithotomy    Findings:   Risks and Benefits discussed with patient prior to procedure. The patient was prepped and draped in the usual sterile fashion. The flexible cystoscope was advanced through the urethra and into the bladder. The bladder was thoroughly inspected and the following was noted:    Residual Urine: mild  Urethra: normal appearing urethra with no masses, tenderness or lesions  Prostate: partially obstructing lateral lobes of prostate; median lobe present? no.   Bladder: large tumor right lateral wall. No bladder diverticulum. There was mild trabeculation noted. Ureters: Clear efflux from both ureters. Orifices with normal configuration and location. The cystoscope was removed. The patient tolerated the procedure well. Agree with the ROS entered by the MA.

## 2022-06-02 NOTE — TELEPHONE ENCOUNTER
Cysto, TURBT @ [facility] 7/5/22 8:00am **STOP BLOOD THINNERS 6/28/22**   PAT @ [facility] 6/21/22 9:00am         Spoke with patient in office, procedure info given to patient.

## 2022-06-08 ENCOUNTER — HOSPITAL ENCOUNTER (OUTPATIENT)
Age: 67
Setting detail: SPECIMEN
Discharge: HOME | End: 2022-06-08

## 2022-06-08 DIAGNOSIS — Z12.5 PROSTATE CANCER SCREENING: ICD-10-CM

## 2022-06-08 DIAGNOSIS — R31.9 HEMATURIA, UNSPECIFIED TYPE: ICD-10-CM

## 2022-06-08 LAB
HCT VFR BLD CALC: 44.4 % (ref 40.7–50.3)
HEMOGLOBIN: 14.4 G/DL (ref 13–17)
MCH RBC QN AUTO: 31.9 PG (ref 25.2–33.5)
MCHC RBC AUTO-ENTMCNC: 32.4 G/DL (ref 28.4–34.8)
MCV RBC AUTO: 98.4 FL (ref 82.6–102.9)
NRBC AUTOMATED: 0 PER 100 WBC
PDW BLD-RTO: 12.7 % (ref 11.8–14.4)
PLATELET # BLD: 314 K/UL (ref 138–453)
PMV BLD AUTO: 10.2 FL (ref 8.1–13.5)
PROSTATE SPECIFIC ANTIGEN: 1.27 NG/ML
RBC # BLD: 4.51 M/UL (ref 4.21–5.77)
WBC # BLD: 8.2 K/UL (ref 3.5–11.3)

## 2022-06-20 ENCOUNTER — HOSPITAL ENCOUNTER (EMERGENCY)
Age: 67
Discharge: HOME | End: 2022-06-20
Attending: STUDENT IN AN ORGANIZED HEALTH CARE EDUCATION/TRAINING PROGRAM
Payer: MEDICARE

## 2022-06-20 VITALS
TEMPERATURE: 97.3 F | SYSTOLIC BLOOD PRESSURE: 156 MMHG | OXYGEN SATURATION: 98 % | HEART RATE: 75 BPM | DIASTOLIC BLOOD PRESSURE: 69 MMHG | RESPIRATION RATE: 18 BRPM

## 2022-06-20 DIAGNOSIS — M79.89 LEG SWELLING: Primary | ICD-10-CM

## 2022-06-20 LAB
ANION GAP SERPL CALCULATED.3IONS-SCNC: 10 MMOL/L (ref 9–17)
BUN BLDV-MCNC: 15 MG/DL (ref 8–23)
CALCIUM SERPL-MCNC: 9 MG/DL (ref 8.6–10.4)
CHLORIDE BLD-SCNC: 101 MMOL/L (ref 98–107)
CO2: 24 MMOL/L (ref 20–31)
CREAT SERPL-MCNC: 0.96 MG/DL (ref 0.7–1.2)
GFR AFRICAN AMERICAN: >60 ML/MIN
GFR NON-AFRICAN AMERICAN: >60 ML/MIN
GFR SERPL CREATININE-BSD FRML MDRD: NORMAL ML/MIN/{1.73_M2}
GLUCOSE BLD-MCNC: 97 MG/DL (ref 70–99)
POTASSIUM SERPL-SCNC: 4.3 MMOL/L (ref 3.7–5.3)
SODIUM BLD-SCNC: 135 MMOL/L (ref 135–144)

## 2022-06-20 PROCEDURE — 36415 COLL VENOUS BLD VENIPUNCTURE: CPT

## 2022-06-20 PROCEDURE — 99284 EMERGENCY DEPT VISIT MOD MDM: CPT

## 2022-06-20 PROCEDURE — 80048 BASIC METABOLIC PNL TOTAL CA: CPT

## 2022-06-20 PROCEDURE — 93971 EXTREMITY STUDY: CPT

## 2022-06-20 RX ORDER — SODIUM CHLORIDE, SODIUM LACTATE, POTASSIUM CHLORIDE, CALCIUM CHLORIDE 600; 310; 30; 20 MG/100ML; MG/100ML; MG/100ML; MG/100ML
1000 INJECTION, SOLUTION INTRAVENOUS CONTINUOUS
Status: CANCELLED | OUTPATIENT
Start: 2022-06-20

## 2022-06-20 ASSESSMENT — ENCOUNTER SYMPTOMS
RHINORRHEA: 0
ABDOMINAL PAIN: 0
COUGH: 0
SORE THROAT: 0
BACK PAIN: 0
DIARRHEA: 0
FACIAL SWELLING: 0
NAUSEA: 0
SHORTNESS OF BREATH: 0
EYE REDNESS: 0
VOMITING: 0
EYE PAIN: 0
COLOR CHANGE: 0

## 2022-06-20 ASSESSMENT — LIFESTYLE VARIABLES
HOW OFTEN DO YOU HAVE A DRINK CONTAINING ALCOHOL: 4 OR MORE TIMES A WEEK
HOW MANY STANDARD DRINKS CONTAINING ALCOHOL DO YOU HAVE ON A TYPICAL DAY: 3 OR 4

## 2022-06-20 NOTE — ED NOTES
Pt given instructions for follow-up and discharge. Pt verbalizes understanding. Pt is A&O x4, PWD, eupneic, and ambulatory with steady, even gait upon discharge.         Davion Coker RN  06/20/22 4425

## 2022-06-20 NOTE — ED PROVIDER NOTES
 Hypertension     Lung nodule     has follow up CT scans    Mild sleep apnea     Palpitations     Perforated stomach ([address] 1985    Pure hypercholesterolemia     Tubular adenoma of colon 01/2012     Dr. Uzair Monaco colonoscopy    Wears partial dentures     Bottom     Past Problem List  Patient Active Problem List   Diagnosis Code    Allergic rhinitis J30.9    Anxiety F41.9    Hyperplastic colon polyp K63.5    Gilliam's esophagus without dysplasia K22.70    GERD (gastroesophageal reflux disease) K21.9    Bleeding ulcer K27.4    Environmental allergies     Tubular adenoma of colon D12.6    History of tobacco abuse Z87.891    Essential hypertension I10    Pure hypercholesterolemia E78.00    Chronic obstructive pulmonary disease (HCC) J44.9    Bilateral hearing loss H91.93    Tinnitus aurium H93.19    Acute pain of right shoulder M25.511    Basal cell carcinoma of left upper arm C44.619    Seasonal allergic rhinitis due to pollen J30.1    Cold sore B00.1    Ventral hernia without obstruction or gangrene K43.9    Pulmonary nodules R91.8    Mass of right thigh R22.41    Osteopenia of right lower leg M85.861    Chronic pain of right knee M25.561, G89.29    Hyperglycemia R73.9    Localized swelling of lower extremity M79.89    Class 2 drug-induced obesity with serious comorbidity and body mass index (BMI) of 38.0 to 38.9 in adult E66.1, Z68.38    Class 2 severe obesity due to excess calories with serious comorbidity and body mass index (BMI) of 38.0 to 38.9 in adult (HCC) E66.01, Z68.38    Right hand pain M79.641    Basal cell carcinoma (BCC) of left upper arm C44.619     SURGICAL HISTORY       Past Surgical History:   Procedure Laterality Date    APPENDECTOMY      COLONOSCOPY      COLONOSCOPY  01/06/2012    COLONOSCOPY N/A 04/29/2019    COLONOSCOPY POLYPECTOMY SNARE/COLD BIOPSY performed [address] Trevon Newton MD [address] ARTHROSCOPY Right 4/7/2022    KNEE ARTHROSCOPIC PARTIAL LATERAL AND PARTIAL MEDIAL MENISCECTOMY performed by Karen Pena MD at [address]  10/05/2018    SKIN CANCER EXCISION      STOMACH SURGERY      Perforated Stomach repair    TONSILLECTOMY      UMBILICAL HERNIA REPAIR  2011    UPPER GASTROINTESTINAL ENDOSCOPY      UPPER GASTROINTESTINAL ENDOSCOPY N/A 2019    EGD BIOPSY performed by Chris Mcdaniel MD at [address] [facility]       Discharge Medication List as of 2022  5:07 PM      CONTINUE these medications which have NOT CHANGED    Details   hydroCHLOROthiazide (HYDRODIURIL) 25 MG tablet Take 1 tablet by mouth every morning, Disp-90 tablet, R-1Normal      SPIRIVA RESPIMAT 2.5 MCG/ACT AERS inhaler USE 2 INHALATIONS DAILY, Disp-12 g, R-3Normal      amLODIPine (NORVASC) 10 MG tablet Take 10 mg by mouth at bedtime Historical Med      TART CHERRY PO Take 1 capsule by mouth dailyHistorical Med      lansoprazole (PREVACID) 30 MG delayed release capsule TAKE 1 CAPSULE DAILY, Disp-90 capsule, R-3Normal      simvastatin (ZOCOR) 20 MG tablet TAKE 1 TABLET NIGHTLY, Disp-90 tablet, R-3YOUR PATIENT HAS REQUESTED A REFILL OF THIS MEDICATION, PREVIOUSLY AUTHORIZED BY ANOTHER PRESCRIBER. Normal      fluticasone (FLONASE) 50 MCG/ACT nasal spray USE 2 SPRAYS NASALLY DAILY, Disp-48 g, R-3Normal      vitamin D3 (CHOLECALCIFEROL) 25 MCG (1000 UT) TABS tablet Take 1 tablet by mouth daily, Disp-90 tablet, R-3Normal      aspirin 81 MG tablet Take 81 mg by mouth daily  Historical Med           ALLERGIES     is allergic to compazine spansule  [prochlorperazine], metoclopramide, prochlorperazine edisylate, reglan  [metoclopramide hcl], reglan [metoclopramide hcl], and seasonal.  FAMILY HISTORY     He indicated that his mother is . He indicated that his father is . He indicated that his sister is alive. He indicated that his maternal grandmother is . He indicated that his maternal grandfather is . He indicated that his paternal grandmother is . He indicated that his paternal grandfather is . SOCIAL HISTORY       Social History     Tobacco Use    Smoking status: Former Smoker     Packs/day: 1.00     Years: 30.00     Pack years: 30.00     Start date: 5     Quit date: 2014     Years since quittin.4    Smokeless tobacco: Never Used   Vaping Use    Vaping Use: Never used   Substance Use Topics    Alcohol use: Yes     Alcohol/week: 6.0 standard drinks     Types: 6 Cans of beer per week     Comment: 6 cans beer/day    Drug use: No     PHYSICAL EXAM     INITIAL VITALS: BP (!) 156/69   Pulse 75   Temp 97.3 °F (36.3 °C) (Temporal)   Resp 18   SpO2 98%    Physical Exam  Vitals and nursing note reviewed. Constitutional:       Appearance: Normal appearance. HENT:      Head: Normocephalic and atraumatic. Nose: Nose normal.   Eyes:      Extraocular Movements: Extraocular movements intact. Pupils: Pupils are equal, round, and reactive to light. Cardiovascular:      Rate and Rhythm: Normal rate and regular rhythm. Pulmonary:      Effort: Pulmonary effort is normal. No respiratory distress. Breath sounds: Normal breath sounds. Abdominal:      General: Abdomen is flat. There is no distension. Palpations: Abdomen is soft. There is no mass. Musculoskeletal:         General: No swelling. Normal range of motion. Cervical back: Normal range of motion. No rigidity. Comments: Right posterior calf tenderness warm well perfused extremities, asymmetric right lower extremity swelling, visible venous collaterals   Skin:     General: Skin is warm and dry. Neurological:      General: No focal deficit present. Mental Status: He is alert. Mental status is at baseline. Cranial Nerves: No cranial nerve deficit.          MEDICAL DECISION MAKIN-year-old male presents for evaluation of right lower extremity pain and swelling with recent right knee surgery about 2 months ago. Ultrasound of the right lower extremity showed no evidence of DVT. Discussed results with patient. He will follow-up with primary care and orthopedic surgery for reevaluation. No overlying skin redness do not suspect cellulitis. Patient's knee has full range of motion do not suspect septic arthritis. CRITICAL CARE:       PROCEDURES:    Procedures    DIAGNOSTIC RESULTS   EKG:All EKG's are interpreted by the Emergency Department Physician who either signs or Co-signs this chart in the absence of a cardiologist.        RADIOLOGY:All plain film, CT, MRI, and formal ultrasound images (except ED bedside ultrasound) are read by the radiologist, see reports below, unless otherwisenoted in MDM or here. VL DUP LOWER EXTREMITY VENOUS RIGHT    (Results Pending)     LABS: All lab results were reviewed by myself, and all abnormals are listed below. Labs Reviewed   BASIC METABOLIC PANEL W/ REFLEX TO MG FOR LOW K       EMERGENCY DEPARTMENTCOURSE:         Vitals:    Vitals:    06/20/22 1536   BP: (!) 156/69   Pulse: 75   Resp: 18   Temp: 97.3 °F (36.3 °C)   TempSrc: Temporal   SpO2: 98%       The patient was given the following medications while in the emergency department:  No orders of the defined types were placed in this encounter. CONSULTS:  None    FINAL IMPRESSION      1. Leg swelling          DISPOSITION/PLAN   DISPOSITION Decision To Discharge 06/20/2022 05:14:46 PM      PATIENT REFERRED TO:  VIKY Shelby CNP 67  [address] [address]  [address] [facility] [address]  [phone]    Call   As needed    DISCHARGE MEDICATIONS:  Discharge Medication List as of 6/20/2022  5:07 PM        The care is provided during an unprecedented national emergency due to the novel coronavirus, COVID 19.   MD Tam Aldana MD  06/20/22 2455

## 2022-06-21 ENCOUNTER — HOSPITAL ENCOUNTER (OUTPATIENT)
Dept: PREADMISSION TESTING | Age: 67
Discharge: HOME | End: 2022-06-25
Payer: MEDICARE

## 2022-06-21 VITALS
HEART RATE: 70 BPM | DIASTOLIC BLOOD PRESSURE: 84 MMHG | TEMPERATURE: 97.9 F | WEIGHT: 283 LBS | RESPIRATION RATE: 18 BRPM | OXYGEN SATURATION: 97 % | SYSTOLIC BLOOD PRESSURE: 155 MMHG | HEIGHT: 71 IN | BODY MASS INDEX: 39.62 KG/M2

## 2022-06-21 PROCEDURE — 93005 ELECTROCARDIOGRAM TRACING: CPT | Performed by: ANESTHESIOLOGY

## 2022-06-21 PROCEDURE — 36415 COLL VENOUS BLD VENIPUNCTURE: CPT

## 2022-06-21 PROCEDURE — 87086 URINE CULTURE/COLONY COUNT: CPT

## 2022-06-21 RX ORDER — ALPRAZOLAM 0.25 MG/1
0.25 TABLET ORAL NIGHTLY PRN
COMMUNITY

## 2022-06-21 NOTE — H&P
History and Physical    Pt Name: Linda Grider  MRN: [id number]  YOB: 1955  Date of evaluation: 6/21/2022  Primary Care Physician: VIKY Boyer CNP    SUBJECTIVE:   History of Chief Complaint:    Linda Grider is a 69 y.o. male who presents for PAT appointment. Patient complains of left flank pain which he states has been better in recent weeks, as well as intermittent hematuria for weeks. Patient denies any frequency or dysuria. On imaging study, patient was found to have bladder tumor. Patient has been scheduled for CYSTOSCOPY TRANSURETHRAL RESECTION BLADDER TUMOR WITH GYRUS. Allergies  is allergic to compazine spansule [prochlorperazine], metoclopramide, and seasonal.  Medications  Prior to Admission medications    Medication Sig Start Date End Date Taking? Authorizing Provider   ALPRAZolam (XANAX) 0.25 MG tablet Take 0.25 mg by mouth nightly as needed for Sleep.    Yes Historical Provider, MD   hydroCHLOROthiazide (HYDRODIURIL) 25 MG tablet Take 1 tablet by mouth every morning  Patient not taking: Reported on 6/21/2022 5/6/22   VIKY Boyer CNP   SPIRIVA RESPIMAT 2.5 MCG/ACT AERS inhaler USE 2 INHALATIONS DAILY 4/15/22   Frederic Loving MD   amLODIPine (NORVASC) 10 MG tablet Take 10 mg by mouth at bedtime     Historical Provider, MD   TART CHERRY PO Take 1 capsule by mouth daily    Historical Provider, MD   lansoprazole (PREVACID) 30 MG delayed release capsule TAKE 1 CAPSULE DAILY 1/20/22   VIKY Boyer CNP   simvastatin (ZOCOR) 20 MG tablet TAKE 1 TABLET NIGHTLY 10/18/21   VIKY Boyer CNP   fluticasone (FLONASE) 50 MCG/ACT nasal spray USE 2 SPRAYS NASALLY DAILY 5/27/21   VIKY Branham CNP   vitamin D3 (CHOLECALCIFEROL) 25 MCG (1000 UT) TABS tablet Take 1 tablet by mouth daily 3/19/21   VIKY Boyer CNP   aspirin 81 MG tablet Take 81 mg by mouth daily      Historical Provider, MD     Past Medical History    has a past medical history of Allergic rhinitis, Anxiety, Arthritis, Gilliam esophagus, Basal cell carcinoma of left upper arm, Bladder mass, Bleeding ulcer, Chest tightness, Chronic pain of right knee, Colon polyps, COPD (chronic obstructive pulmonary disease) ([facility] [address] Duodenal hemorrhage, Environmental allergies, GERD (gastroesophageal reflux disease), Gout, H. pylori infection, History of blood transfusion, Hyperlipidemia, Hyperplastic colon polyp, Hypertension, Lung nodule, Mild sleep apnea, Palpitations, Perforated stomach ([facility] [address] Pure hypercholesterolemia, Tubular adenoma of colon, Under care of team, Under care of team, Under care of team, Under care of team, Wears glasses, Wears partial dentures, and Wellness examination. Past Surgical History   has a past surgical history that includes Umbilical hernia repair (2011); Stomach surgery; Appendectomy; Tonsillectomy; Colonoscopy; Upper gastrointestinal endoscopy; Colonoscopy (2012); Nasal sinus surgery (10/05/2018); Upper gastrointestinal endoscopy (N/A, 2019); Colonoscopy (N/A, 2019); Skin cancer excision; and Knee arthroscopy (Right, 2022). Social History   reports that he quit smoking about 7 years ago. He started smoking about 52 years ago. He has a 30.00 pack-year smoking history. He has never used smokeless tobacco.    reports current alcohol use of about 6.0 standard drinks of alcohol per week. reports no history of drug use. Marital Status   Children 1  Occupation retired  Family History  Family Status   Relation Name Status    Mother      Father      Sister  Alive    MGM      MGF      [facility]      PGF       family history includes Cancer in his mother and sister; Emphysema in his paternal grandfather; Heart Attack in his paternal grandfather; Thyroid Disease in his sister.     Review of Systems:  CONSTITUTIONAL:   negative for fevers, chills, fatigue and malaise    EYES:   negative for double vision, blurred vision and photophobia    HEENT:   negative for tinnitus, epistaxis and sore throat     RESPIRATORY:   negative for cough, shortness of breath, wheezing     CARDIOVASCULAR:   negative for chest pain, palpitations, syncope, edema     GASTROINTESTINAL:   negative for nausea, vomiting     GENITOURINARY:   negative for incontinence history of left flank pain and hematuria    MUSCULOSKELETAL:   negative for neck or back pain     NEUROLOGICAL:   Negative for weakness and tingling  negative for headaches and dizziness     PSYCHIATRIC:   negative for anxiety       OBJECTIVE:   VITALS:  height is 5' 11\" (1.803 m) and weight is 283 lb (128.4 kg). His temporal temperature is 97.9 °F (36.6 °C). His blood pressure is 155/84 (abnormal) and his pulse is 70. His respiration is 18 and oxygen saturation is 97%. CONSTITUTIONAL:alert & oriented x 3, no acute distress. Calm and pleasant. SKIN:  Warm and dry, no rashes to exposed areas of skin. HEAD:  Normocephalic, atraumatic. EYES: PERRL. EOMs intact. Wearing glasses. EARS:  Intact and equal bilaterally. No edema or thickening, without lumps, lesions, or discharge. Hearing loss, no aides. NOSE:  Nares patent. No rhinorrhea   MOUTH/THROAT:  Mucous membranes pink and moist, uvula midline, teeth appear to be intact, wears partial lower dentures. NECK:supple, no lymphadenopathy  LUNGS: Respirations even and non-labored. Clear to RUL, RLL, CHINO, mild inspiratory wheeze to LLL. CARDIOVASCULAR: Regular rate and rhythm, no murmurs/rubs/gallops   ABDOMEN: soft, non-tender, rotund, bowel sounds active x 4   EXTREMITIES: 1+ edema to LLE, 2+ edema to RLE, wear compression stockings. No varicosities to bilateral lower extremities. NEUROLOGIC: CN II-XII are grossly intact. Gait is smooth, rhythmic and effortless. Testing:   EKG: on file from 3/2022  Labs: on file from 6/20/2022  IMPRESSIONS:   Bladder mass.   PLANS:   CYSTOSCOPY TRANSURETHRAL RESECTION BLADDER TUMOR WITH VIKY Gil - CNP  Electronically signed 6/21/2022 at 10:21 AM

## 2022-06-21 NOTE — PROGRESS NOTES
Anesthesia Focused Assessment    Hx of anesthesia complications:  no  Family hx of anesthesia complications:  no      Prior + Covid-19 test? no        STOP-BANG Sleep Apnea Questionnaire    SNORE loudly (heard through closed doors)? No  TIRED, fatigued, sleepy during daytime? No  OBSERVED stopping breathing during sleep? Yes  High blood PRESSURE or being treated? Yes    BMI over 35? Yes  AGE over 48? Yes  NECK circumference over 16\"? No  GENDER (male)? Yes             Total 5  High risk 5-8  Intermediate risk 3-4  Low risk 0-2    ----------------------------------------------------------------------------------------------------------------------  DEIRDRE                              Yes, states \"mild\"  If yes, machine? No    DM1                                            No  DM2                   No    Coronary Artery Disease      Yes, per CT chest 2020, no stents  HTN         Yes  Defib/AICD/Pacemaker               No             Renal Failure                   No  If yes, on dialysis           Active smoker? No, former  Drinks alcohol? Yes, few beers daily  Illicit drugs? No  Dentition?         Partial lower dentures      Past Medical History:   Diagnosis Date    Allergic rhinitis     Anxiety     Arthritis     Gilliam esophagus     RESOLVED    Basal cell carcinoma of left upper arm 02/09/2018    Bladder mass     Bleeding ulcer     Also states hx of \"perforated stomach\" in 1985    Chest tightness     Chronic pain of right knee     Colon polyps     COPD (chronic obstructive pulmonary disease) (HCC)     Duodenal hemorrhage     Environmental allergies     GERD (gastroesophageal reflux disease)     Gout     RT HAND    H. pylori infection     History of blood transfusion 1985    DUODENAL BLEED    Hyperlipidemia     Hyperplastic colon polyp     Hypertension     Lung nodule     has yearly follow up CT scans    Mild sleep apnea     no machine    Palpitations     Perforated stomach ([facility] [address] 1985    Pure hypercholesterolemia     Tubular adenoma of colon 01/2012     Dr. Candance Sia colonoscopy    Under care of team 06/21/2022    UROLOGY - DR. SÁNCHEZ - LAST VISIT 6/2022    Under care of team 06/21/2022    PULMONOLOGY- DR. Shante Cole - LAST VISIT 4/2022    Under care of team 06/21/2022    ORTHO - DR. BROWER - LAST VISIT 4/2022    Under care of team 06/21/2022    GI - DR. Joy Carter Wears glasses     Wears partial dentures     Bottom    Wellness examination 06/21/2022    PCP - Yusef Amaya CNP - LAST VISIT - 5/2022         Patient was evaluated in PAT & anesthesia guidelines were applied. NPO guidelines, medication instructions and scheduled arrival time were reviewed with patient. Anesthesia contacted:   Yes    Patient with history of HTN. Patient follows with pulmonology, has annual CT chest due to history of COPD, pulmonary nodule. Per CT chest in 2020, findings of CAD, probable pulmonary arterial hypertension, atherosclerotic calcification of the aorta. CT chest is benign. However, patient also has a history of chest pain and SHER. PCP had ordered a stress test 11/2021, which patient has not yet completed. EKG today is NSR, patient reports he no longer has chest pain but does continue to have SHER. PCP has already cleared this patient for surgery. Medical or cardiac clearance ordered: updated medical clearance with cardiac attn, pulmonary clearance pending.      VIKY Cunningham - CNP   6/21/22  11:18 AM

## 2022-06-22 LAB
CULTURE: NO GROWTH
EKG ATRIAL RATE: 67 BPM
EKG P AXIS: 52 DEGREES
EKG P-R INTERVAL: 166 MS
EKG Q-T INTERVAL: 422 MS
EKG QRS DURATION: 104 MS
EKG QTC CALCULATION (BAZETT): 445 MS
EKG R AXIS: 60 DEGREES
EKG T AXIS: 55 DEGREES
EKG VENTRICULAR RATE: 67 BPM
SPECIMEN DESCRIPTION: NORMAL

## 2022-06-22 PROCEDURE — 93010 ELECTROCARDIOGRAM REPORT: CPT | Performed by: INTERNAL MEDICINE

## 2022-06-29 ENCOUNTER — TELEPHONE (OUTPATIENT)
Dept: UROLOGY | Age: 67
End: 2022-06-29

## 2022-06-29 NOTE — TELEPHONE ENCOUNTER
Writer called, spoke with Alexander Richardson, advised that Radha Pryor was on the phone to schedule patients stress test, advised that Radha Pryor is able to get patient in on 6/30, but order would need to be changed to \"Hospital\" and \"stat\". Alexander Richardson states that the MA in her office was scheduling the patient and they were able to get patient in on Friday 7/1 @ 0900. Advised results may be not be available prior to procedure, especially with the holiday weekend. Alexander Richardson states that her provider is out of the office and MA is at lunch so no one is available to change the order.

## 2022-06-29 NOTE — TELEPHONE ENCOUNTER
Per [facility] @ [facility] pre-op testing, anesthesia is requiring medical clearance with cardiac attention. Per anesthesia note, anesthesia not requiring stress test.     PCP was ok to medical clear patient, clearance in Epic but is not currently willing to clear patient without a stress test that they asked the patient to have done in November 2021 for medical clearance with cardiac attention. Patient has yet to schedule or attempt to have stress test done. Called PCP office to inquire and discuss clearance. Left voicemail for patient to discuss stress test and if we can get him scheduled because this is the requirement of his PCP to clear the patient for surgery. Per Wife patient is very upset, she took message to have patient call office back.

## 2022-06-29 NOTE — TELEPHONE ENCOUNTER
Write spoke with patient. Patient understands Urology point of view is to side with the stress test that is required by PCP for medical clearance with cardiac attention. Patient is agreeable to have stress done if we are able to get him an appt.  Writer thank patient for understanding

## 2022-07-01 ENCOUNTER — HOSPITAL ENCOUNTER (OUTPATIENT)
Dept: NUCLEAR MEDICINE | Age: 67
Discharge: HOME | End: 2022-07-03
Payer: MEDICARE

## 2022-07-01 ENCOUNTER — HOSPITAL ENCOUNTER (OUTPATIENT)
Dept: NON INVASIVE DIAGNOSTICS | Age: 67
Discharge: HOME | End: 2022-07-01
Payer: MEDICARE

## 2022-07-01 ENCOUNTER — HOSPITAL ENCOUNTER (OUTPATIENT)
Dept: NON INVASIVE DIAGNOSTICS | Age: 67
Discharge: HOME | End: 2022-07-01

## 2022-07-01 DIAGNOSIS — R07.89 CHEST PRESSURE: ICD-10-CM

## 2022-07-01 LAB
LV EF: 57 %
LVEF MODALITY: NORMAL

## 2022-07-01 PROCEDURE — 93017 CV STRESS TEST TRACING ONLY: CPT

## 2022-07-01 PROCEDURE — A9500 TC99M SESTAMIBI: HCPCS | Performed by: NURSE PRACTITIONER

## 2022-07-01 PROCEDURE — 2580000003 HC RX 258: Performed by: NURSE PRACTITIONER

## 2022-07-01 PROCEDURE — 78452 HT MUSCLE IMAGE SPECT MULT: CPT

## 2022-07-01 PROCEDURE — 3430000000 HC RX DIAGNOSTIC RADIOPHARMACEUTICAL: Performed by: NURSE PRACTITIONER

## 2022-07-01 PROCEDURE — 6360000002 HC RX W HCPCS: Performed by: NURSE PRACTITIONER

## 2022-07-01 RX ORDER — METOPROLOL TARTRATE 5 MG/5ML
5 INJECTION INTRAVENOUS EVERY 5 MIN PRN
Status: DISCONTINUED | OUTPATIENT
Start: 2022-07-01 | End: 2022-07-01 | Stop reason: HOSPADM

## 2022-07-01 RX ORDER — NITROGLYCERIN 0.4 MG/1
0.4 TABLET SUBLINGUAL EVERY 5 MIN PRN
Status: DISCONTINUED | OUTPATIENT
Start: 2022-07-01 | End: 2022-07-01 | Stop reason: HOSPADM

## 2022-07-01 RX ORDER — ALBUTEROL SULFATE 90 UG/1
2 AEROSOL, METERED RESPIRATORY (INHALATION) PRN
Status: DISCONTINUED | OUTPATIENT
Start: 2022-07-01 | End: 2022-07-01 | Stop reason: HOSPADM

## 2022-07-01 RX ORDER — SODIUM CHLORIDE 0.9 % (FLUSH) 0.9 %
5-40 SYRINGE (ML) INJECTION PRN
Status: DISCONTINUED | OUTPATIENT
Start: 2022-07-01 | End: 2022-07-01 | Stop reason: HOSPADM

## 2022-07-01 RX ORDER — AMINOPHYLLINE DIHYDRATE 25 MG/ML
50 INJECTION, SOLUTION INTRAVENOUS PRN
Status: DISCONTINUED | OUTPATIENT
Start: 2022-07-01 | End: 2022-07-01 | Stop reason: HOSPADM

## 2022-07-01 RX ORDER — SODIUM CHLORIDE 0.9 % (FLUSH) 0.9 %
10 SYRINGE (ML) INJECTION PRN
Status: DISCONTINUED | OUTPATIENT
Start: 2022-07-01 | End: 2022-07-04 | Stop reason: HOSPADM

## 2022-07-01 RX ORDER — SODIUM CHLORIDE 9 MG/ML
500 INJECTION, SOLUTION INTRAVENOUS CONTINUOUS PRN
Status: DISCONTINUED | OUTPATIENT
Start: 2022-07-01 | End: 2022-07-01 | Stop reason: HOSPADM

## 2022-07-01 RX ORDER — ATROPINE SULFATE 0.1 MG/ML
0.5 INJECTION INTRAVENOUS EVERY 5 MIN PRN
Status: DISCONTINUED | OUTPATIENT
Start: 2022-07-01 | End: 2022-07-01 | Stop reason: HOSPADM

## 2022-07-01 RX ADMIN — TETRAKIS(2-METHOXYISOBUTYLISOCYANIDE)COPPER(I) TETRAFLUOROBORATE 35.5 MILLICURIE: 1 INJECTION, POWDER, LYOPHILIZED, FOR SOLUTION INTRAVENOUS at 08:58

## 2022-07-01 RX ADMIN — REGADENOSON 0.4 MG: 0.08 INJECTION, SOLUTION INTRAVENOUS at 08:59

## 2022-07-01 RX ADMIN — TETRAKIS(2-METHOXYISOBUTYLISOCYANIDE)COPPER(I) TETRAFLUOROBORATE 10.4 MILLICURIE: 1 INJECTION, POWDER, LYOPHILIZED, FOR SOLUTION INTRAVENOUS at 07:17

## 2022-07-01 RX ADMIN — Medication 10 ML: at 07:17

## 2022-07-01 NOTE — PROCEDURES
[address] [place], [place] [address]                              CARDIAC STRESS TEST    PATIENT NAME: Waylon Harkins                     :        1955  MED REC NO:   [id number]                              ROOM:  ACCOUNT NO:   [de-identified]                           ADMIT DATE: 2022  PROVIDER:     [address] [place] [address]     DATE OF STUDY:  2022    ORDERING PROVIDER:  CHARO Rosado    PRIMARY CARE PROVIDER:  CHARO Rosado    INTERPRETING PHYSICIAN:  FLASH PERSAUD DO    _____ STRESS TESTING    TEST TYPE: LEXISCAN CARDIOLYTE STRESS TEST  INDICATION: SHORTNESS OF BREATH, PRE-OP  REFERRING PHYSICIAN: CHARO SALAZAR    RESTING HEART RATE: 64 BEATS PER MINUTE  RESTING BLOOD PRESSURE: 139/73    MEDICATION(S) GIVEN: 0.4MG IV LEXISCAN  REASON FOR TERMINATION: MEDICATION INFUSION COMPLETE    RESTING EKG: ABNORMAL, NORMAL SINUS RHYTHM, MARGE  STRESS HEART RESPONSE: NORMAL RESPONSE  BLOOD PRESSURE RESPONSE: APPROPRIATE  STRESS EKGs: NORMAL  CHEST DISCOMFORT: NO PAIN DURING STRESS  ISCHEMIC EKG CHANGES: NONE    EKG IMPRESSION: ELECTROCARDIOGRAPHICALLY NEGATIVE LEXISCAN STRESS TEST. RADIOISOTOPE RESULTS TO FOLLOW FROM THE DEPARTMENT OF NUCLEAR MEDICINE.     COMMENTS:        Regina Dumont DO    D: 2022 9:26:05       T: 2022 9:27:18     /YISEL  Job#: [id number]     Doc#: Unknown    CC:    (Retain this field even if not dictated or not decipherable)

## 2022-07-01 NOTE — PROGRESS NOTES
CST Lexisca/Exercise. Stress Tech performs patient preparation of physical comfort, review test procedures, pre-stress EKG. Lung Sounds clear t/o. Consent verified by pt. .  Educated patient on test procedure and possible side effects of Lexiscan as well as s/s to report. Cardiologist reviewed pre-test EKG and is present for test.  Patient tolerated test well with minor SOB  which resolved to baseline after test with caffeine. EKG portion of testing is completed and  nuc. med. portion is still pending.

## 2022-07-07 ENCOUNTER — TELEPHONE (OUTPATIENT)
Dept: UROLOGY | Age: 67
End: 2022-07-07

## 2022-07-07 NOTE — TELEPHONE ENCOUNTER
Left voicemail for patient. Writer calling to obtain medical/cardac update on patient's condition. Urology would like to get patient back on the surgery schedule due to the nature of the patient's dx. Will need medical clearance w/ cardiac attn to reschedule.

## 2022-07-18 NOTE — TELEPHONE ENCOUNTER
Received call from patient. .. he could not get in to see cardiologist until 8/8/2022. Was told he is going to need a stent(s) placed in his heart before he could have TURBT. .. pt stated cardio would not give him a surgery date for stent(s) until he was seen on 8/8/2022, pt will call us back on 8/8/2022 with an update and we will get him scheduled for TURBT once stents have been placed & we have received clearance from cardiologist that it is safe for him to go under general anesthesia

## 2022-07-19 NOTE — TELEPHONE ENCOUNTER
Per patient on 7/18/2022 @ 2pm     Note   Received call from patient. .. he could not get in to see cardiologist until 8/8/2022. Was told he is going to need a stent(s) placed in his heart before he could have TURBT. .. pt stated cardio would not give him a surgery date for stent(s) until he was seen on 8/8/2022, pt will call us back on 8/8/2022 with an update and we will get him scheduled for TURBT once stents have been placed & we have received clearance from cardiologist that it is safe for him to go under general anesthesia

## 2022-08-18 ENCOUNTER — TELEPHONE (OUTPATIENT)
Dept: UROLOGY | Age: 67
End: 2022-08-18

## 2022-08-18 NOTE — TELEPHONE ENCOUNTER
MARCELA Spangler @ [facility] [place] 9/13/22 12:30pm **STOP BLOOD THINNERS 9/6/22**   PAT @ [facility] [place] 8/30/22 9:00am           Spoke with patient, procedure info emailed.

## 2022-08-25 RX ORDER — SODIUM CHLORIDE, SODIUM LACTATE, POTASSIUM CHLORIDE, CALCIUM CHLORIDE 600; 310; 30; 20 MG/100ML; MG/100ML; MG/100ML; MG/100ML
1000 INJECTION, SOLUTION INTRAVENOUS CONTINUOUS
Status: CANCELLED | OUTPATIENT
Start: 2022-08-25

## 2022-08-25 NOTE — DISCHARGE INSTRUCTIONS
Preoperative Instructions:    Stop eating solid foods at midnight the night prior to surgery. Stop drinking clear liquids at midnight the night prior to surgery. (Follow bowel prep instructions if instructed by your surgeon.)    Maribeth Clinton at the surgery center (Entrance B) by 10:30 on 9/13/2022  (or as directed by your surgeon's office). Please stop any blood thinning medications as directed by your surgeon or prescribing physician. Failure to stop certain medications may interfere with your scheduled surgery. These may include:  Aspirin, Warfarin (Coumadin), Clopidogrel (Plavix), Ibuprofen (Motrin, Advil), Naproxen (Aleve), Meloxicam (Mobic), Celecoxib (Celebrex), Eliquis, Pradaxa, Xarelto, Effient, Fish Oil, Herbal supplements. You may continue the rest of your medications through the night before surgery unless instructed otherwise. Please take only the following medication(s) the day of surgery with a small sip of water:  Norvasc/amlodipine, prevacid/omeprazole    Please use and bring inhalers the day of surgery. Please bring CPAP the day of surgery. ____________________________  ____________________________  Signature (Patient)           Signature/date(Provider)      REMINDERS:  ** If you are going home the day of your procedure, you will need a friend or family member to drive you home after your procedure. Your  must be 25years of age or older and able to sign off on your discharge instructions. Taxi cabs or any form of public transportation is not acceptable. ** It is preferable that the friend or family member stay at the hospital throughout your procedure. ** If you are going home the same day as your procedure, someone must remain with you for the first 24 hours after your surgery if you receive anesthesia or sedation. If you do not have someone to stay with you, your procedure may be cancelled.   **  Please do not wear any jewelry or body piercings the day of surgery. PREPARING FOR YOUR SURGERY:     Before surgery, you can play an important role in your own health. Because skin is not sterile, we need to be sure that your skin is as free of germs as possible before surgery by carefully washing before surgery. Preparing or prepping skin before surgery can reduce the risk of a surgical site infection.   Do not shave the area of your body where your surgery will be performed unless you received specific permission from your physician. You will need to shower at home the night before surgery and the morning of surgery with a special soap called chlorhexidine gluconate (CHG*). *Not to be used by people allergic to Chlorhexidine Gluconate (CHG). Following these instructions will help you be sure that your skin is clean before surgery. Instructions on cleaning your skin before surgery: The night before your surgery: You will need to shower with warm water (not hot) and the CHG soap. Use a clean wash cloth and a clean towel. Have clean clothes available to put on after the shower. First wash your hair with regular shampoo. Rinse your hair and body thoroughly to remove the shampoo. Wash your face with your regular soap or water only. Thoroughly rinse your body with warm water from the neck down. Turn water off to prevent rinsing the soap off too soon. With a clean wet washcloth and half of the CHG soap in the bottle, lather your entire body from the neck down. Do not use CHG soap near your eyes or ears to avoid injury to those areas. Wash thoroughly, paying special attention to the area where your surgery will be performed. Wash your body gently for five (5) minutes. Avoid scrubbing your skin too hard. Turn the water back on and rinse your body thoroughly. Pat yourself dry with a clean, soft towel. Do not apply lotion, cream or powder. Dress with clean freshly washed clothes.     The morning of surgery:    Repeat shower following steps above  - using remaining half of CHG soap in bottle. If you have any questions, call the Pre-Admission Testing Unit at [phone]. Day of Surgery/Procedure    As a patient at [facility] you can expect quality medical and nursing care that is centered on your individual needs. Our goal is to make your surgical experience as comfortable as possible  . Directions to the [address] [place] [address] is located at [address] [place], [address] [facility]. Please pull into the Emergency [address] parking lot (Entrance B) and park in that lot. We also have additional parking across the street. You will enter the facility following the [address] sign. Please stop at the reception desk where you will be checked in by the staff. If you have any questions please call [phone]. Transportation after your procedure. You will need a friend or family member to drive you home after your procedure. Your  must be 25years of age or older and able to sign off on your discharge instructions. Taxi cabs or any form of public transportation is not acceptable. It is preferable that the friend or family member stay at the hospital throughout your procedure. Someone must remain at home with you for the first 24 hours after your surgery if you receive anesthesia or sedation. If you do not have someone to stay with you, your procedure may be cancelled. Patient Instructions    If you are having any type of anesthesia you are to have nothing to eat or drink after midnight the night before your surgery. This includes gum, mints, water or smoking or chewing tobacco.  The only exception to this is a small sip of water to take with any morning dose of heart, blood pressure, or seizure medications. Bring a list of all medications you take, along with the dose of the medications and how often you take it.   If more convenient want you to be able to spend time with the patient. If other arrangements are not available then we suggest that you have a second adult to stay in the waiting room.       If you have any other questions regarding your procedure or the day of surgery, please call [phone], or [phone]

## 2022-08-30 ENCOUNTER — HOSPITAL ENCOUNTER (OUTPATIENT)
Dept: PREADMISSION TESTING | Age: 67
Discharge: HOME | End: 2022-09-03
Payer: MEDICARE

## 2022-08-30 VITALS
DIASTOLIC BLOOD PRESSURE: 89 MMHG | OXYGEN SATURATION: 98 % | BODY MASS INDEX: 39.62 KG/M2 | HEIGHT: 71 IN | SYSTOLIC BLOOD PRESSURE: 165 MMHG | HEART RATE: 68 BPM | TEMPERATURE: 98.1 F | RESPIRATION RATE: 20 BRPM | WEIGHT: 283 LBS

## 2022-08-30 PROCEDURE — 87086 URINE CULTURE/COLONY COUNT: CPT

## 2022-08-30 NOTE — PROGRESS NOTES
Anesthesia Focused Assessment    STOP-BANG Sleep Apnea Questionnaire    SNORE loudly (heard through closed doors)? Yes  TIRED, fatigued, sleepy during daytime? No  OBSERVED stopping breathing during sleep? No  High blood PRESSURE being treated? Yes    BMI over 35? Yes  AGE over 48? Yes  NECK circumference over 16\"? No  GENDER (male)? Yes             Total 5  High risk 5-8  Intermediate risk 3-4  Low risk 0-2    Obstructive Sleep Apnea: yes, mild, no cpap  If YES, machine used: no cpap     Type 1 DM:   no  T2DM:  no    Coronary Artery Disease:  cath 2022, Dr. Kraig Pelletier, \"normal coronary arteries\"  Hypertension:  yes    Active smoker:  quit 2014  Drinks Alcohol:  socially/regularly    Dentition: lower partial dentures    Defib / AICD / Pacemaker: no      Renal Failure/dialysis:  no    Patient was evaluated in PAT & anesthesia guidelines were applied. NPO guidelines, medication instructions and scheduled arrival time were reviewed with patient. I advised patient to please contact the surgeon's office, ahead of time if possible, if any new signs or symptoms of illness, infection, rash, etc    Hx of anesthesia complications:  no  Family hx of anesthesia complications:  no                                                                                                                     Anesthesia contacted:   no  Medical or cardiac clearance ordered: PCP clearance in paper chart. Pulmonary clearance in paper chart from Dr. Barbara Gonzalez.  Cardiac clearance \"low risk\" in paper chart, as well as stress test results, heart cath results and most recent office notes from Dr. Kraig Pelletier. Patient was scheduled for this procedure in July but rescheduled due to need for cardiac clearance and cardiac cath.     Day Patel PA-C  8/30/22  9:24 AM

## 2022-08-31 LAB
CULTURE: NO GROWTH
SPECIMEN DESCRIPTION: NORMAL

## 2022-09-09 NOTE — TELEPHONE ENCOUNTER
Medical clearance received & scanned into chart SLP Note:       Orders received, patient currently NPO for possible procedure per RN Fannie Mtz. Will follow up when patient appropriate. Thank you.     Eleno Mccoy M.S., CFY-SLP  Speech-Language Pathologist

## 2022-09-13 ENCOUNTER — HOSPITAL ENCOUNTER (OUTPATIENT)
Age: 67
Setting detail: OUTPATIENT SURGERY
Discharge: HOME | End: 2022-09-13
Attending: UROLOGY | Admitting: UROLOGY
Payer: MEDICARE

## 2022-09-13 ENCOUNTER — ANESTHESIA (OUTPATIENT)
Dept: OPERATING ROOM | Age: 67
End: 2022-09-13
Payer: MEDICARE

## 2022-09-13 ENCOUNTER — ANESTHESIA EVENT (OUTPATIENT)
Dept: OPERATING ROOM | Age: 67
End: 2022-09-13
Payer: MEDICARE

## 2022-09-13 VITALS
WEIGHT: 277 LBS | OXYGEN SATURATION: 95 % | RESPIRATION RATE: 16 BRPM | HEIGHT: 71 IN | BODY MASS INDEX: 38.78 KG/M2 | HEART RATE: 64 BPM | TEMPERATURE: 97.2 F | DIASTOLIC BLOOD PRESSURE: 79 MMHG | SYSTOLIC BLOOD PRESSURE: 152 MMHG

## 2022-09-13 DIAGNOSIS — N32.89 BLADDER MASS: ICD-10-CM

## 2022-09-13 LAB — POC POTASSIUM: 3.9 MMOL/L (ref 3.5–4.5)

## 2022-09-13 PROCEDURE — 3700000001 HC ADD 15 MINUTES (ANESTHESIA): Performed by: UROLOGY

## 2022-09-13 PROCEDURE — 2580000003 HC RX 258: Performed by: ANESTHESIOLOGY

## 2022-09-13 PROCEDURE — 6360000002 HC RX W HCPCS: Performed by: ANESTHESIOLOGY

## 2022-09-13 PROCEDURE — 7100000001 HC PACU RECOVERY - ADDTL 15 MIN: Performed by: UROLOGY

## 2022-09-13 PROCEDURE — C2617 STENT, NON-COR, TEM W/O DEL: HCPCS | Performed by: UROLOGY

## 2022-09-13 PROCEDURE — 84132 ASSAY OF SERUM POTASSIUM: CPT

## 2022-09-13 PROCEDURE — C1769 GUIDE WIRE: HCPCS | Performed by: UROLOGY

## 2022-09-13 PROCEDURE — 2500000003 HC RX 250 WO HCPCS

## 2022-09-13 PROCEDURE — 3600000014 HC SURGERY LEVEL 4 ADDTL 15MIN: Performed by: UROLOGY

## 2022-09-13 PROCEDURE — 6360000002 HC RX W HCPCS

## 2022-09-13 PROCEDURE — 3700000000 HC ANESTHESIA ATTENDED CARE: Performed by: UROLOGY

## 2022-09-13 PROCEDURE — 2709999900 HC NON-CHARGEABLE SUPPLY: Performed by: UROLOGY

## 2022-09-13 PROCEDURE — 2720000010 HC SURG SUPPLY STERILE: Performed by: UROLOGY

## 2022-09-13 PROCEDURE — 7100000040 HC SPAR PHASE II RECOVERY - FIRST 15 MIN: Performed by: UROLOGY

## 2022-09-13 PROCEDURE — 7100000041 HC SPAR PHASE II RECOVERY - ADDTL 15 MIN: Performed by: UROLOGY

## 2022-09-13 PROCEDURE — 7100000000 HC PACU RECOVERY - FIRST 15 MIN: Performed by: UROLOGY

## 2022-09-13 PROCEDURE — 3600000004 HC SURGERY LEVEL 4 BASE: Performed by: UROLOGY

## 2022-09-13 PROCEDURE — 88307 TISSUE EXAM BY PATHOLOGIST: CPT

## 2022-09-13 DEVICE — URETERAL STENT
Type: IMPLANTABLE DEVICE | Site: URETER | Status: FUNCTIONAL
Brand: CONTOUR™

## 2022-09-13 RX ORDER — HYDRALAZINE HYDROCHLORIDE 20 MG/ML
10 INJECTION INTRAMUSCULAR; INTRAVENOUS
Status: DISCONTINUED | OUTPATIENT
Start: 2022-09-13 | End: 2022-09-13 | Stop reason: HOSPADM

## 2022-09-13 RX ORDER — SODIUM CHLORIDE 9 MG/ML
25 INJECTION, SOLUTION INTRAVENOUS PRN
Status: DISCONTINUED | OUTPATIENT
Start: 2022-09-13 | End: 2022-09-13 | Stop reason: HOSPADM

## 2022-09-13 RX ORDER — LIDOCAINE HYDROCHLORIDE 10 MG/ML
INJECTION, SOLUTION EPIDURAL; INFILTRATION; INTRACAUDAL; PERINEURAL PRN
Status: DISCONTINUED | OUTPATIENT
Start: 2022-09-13 | End: 2022-09-13 | Stop reason: SDUPTHER

## 2022-09-13 RX ORDER — GLYCOPYRROLATE 0.2 MG/ML
INJECTION INTRAMUSCULAR; INTRAVENOUS PRN
Status: DISCONTINUED | OUTPATIENT
Start: 2022-09-13 | End: 2022-09-13 | Stop reason: SDUPTHER

## 2022-09-13 RX ORDER — TRAMADOL HYDROCHLORIDE 50 MG/1
50 TABLET ORAL EVERY 6 HOURS PRN
Qty: 8 TABLET | Refills: 0 | Status: SHIPPED | OUTPATIENT
Start: 2022-09-13 | End: 2022-09-15

## 2022-09-13 RX ORDER — ONDANSETRON 2 MG/ML
INJECTION INTRAMUSCULAR; INTRAVENOUS PRN
Status: DISCONTINUED | OUTPATIENT
Start: 2022-09-13 | End: 2022-09-13 | Stop reason: SDUPTHER

## 2022-09-13 RX ORDER — DEXAMETHASONE SODIUM PHOSPHATE 10 MG/ML
INJECTION, SOLUTION INTRAMUSCULAR; INTRAVENOUS PRN
Status: DISCONTINUED | OUTPATIENT
Start: 2022-09-13 | End: 2022-09-13 | Stop reason: SDUPTHER

## 2022-09-13 RX ORDER — HYOSCYAMINE SULFATE 0.12 MG/1
1 TABLET SUBLINGUAL EVERY 8 HOURS PRN
Qty: 6 EACH | Refills: 0 | Status: SHIPPED | OUTPATIENT
Start: 2022-09-13 | End: 2022-09-15

## 2022-09-13 RX ORDER — SODIUM CHLORIDE 0.9 % (FLUSH) 0.9 %
5-40 SYRINGE (ML) INJECTION EVERY 12 HOURS SCHEDULED
Status: DISCONTINUED | OUTPATIENT
Start: 2022-09-13 | End: 2022-09-13 | Stop reason: HOSPADM

## 2022-09-13 RX ORDER — DIPHENHYDRAMINE HYDROCHLORIDE 50 MG/ML
12.5 INJECTION INTRAMUSCULAR; INTRAVENOUS
Status: DISCONTINUED | OUTPATIENT
Start: 2022-09-13 | End: 2022-09-13 | Stop reason: HOSPADM

## 2022-09-13 RX ORDER — SODIUM CHLORIDE 0.9 % (FLUSH) 0.9 %
5-40 SYRINGE (ML) INJECTION PRN
Status: DISCONTINUED | OUTPATIENT
Start: 2022-09-13 | End: 2022-09-13 | Stop reason: HOSPADM

## 2022-09-13 RX ORDER — DROPERIDOL 2.5 MG/ML
0.62 INJECTION, SOLUTION INTRAMUSCULAR; INTRAVENOUS
Status: DISCONTINUED | OUTPATIENT
Start: 2022-09-13 | End: 2022-09-13 | Stop reason: HOSPADM

## 2022-09-13 RX ORDER — NEOSTIGMINE METHYLSULFATE 5 MG/5 ML
SYRINGE (ML) INTRAVENOUS PRN
Status: DISCONTINUED | OUTPATIENT
Start: 2022-09-13 | End: 2022-09-13 | Stop reason: SDUPTHER

## 2022-09-13 RX ORDER — DOCUSATE SODIUM 100 MG/1
100 CAPSULE, LIQUID FILLED ORAL 2 TIMES DAILY
Qty: 60 CAPSULE | Refills: 0 | Status: SHIPPED | OUTPATIENT
Start: 2022-09-13 | End: 2022-10-13

## 2022-09-13 RX ORDER — PROPOFOL 10 MG/ML
INJECTION, EMULSION INTRAVENOUS PRN
Status: DISCONTINUED | OUTPATIENT
Start: 2022-09-13 | End: 2022-09-13 | Stop reason: SDUPTHER

## 2022-09-13 RX ORDER — CEFAZOLIN SODIUM 1 G/3ML
INJECTION, POWDER, FOR SOLUTION INTRAMUSCULAR; INTRAVENOUS PRN
Status: DISCONTINUED | OUTPATIENT
Start: 2022-09-13 | End: 2022-09-13 | Stop reason: SDUPTHER

## 2022-09-13 RX ORDER — FENTANYL CITRATE 50 UG/ML
INJECTION, SOLUTION INTRAMUSCULAR; INTRAVENOUS PRN
Status: DISCONTINUED | OUTPATIENT
Start: 2022-09-13 | End: 2022-09-13 | Stop reason: SDUPTHER

## 2022-09-13 RX ORDER — MEPERIDINE HYDROCHLORIDE 50 MG/ML
12.5 INJECTION INTRAMUSCULAR; INTRAVENOUS; SUBCUTANEOUS EVERY 5 MIN PRN
Status: DISCONTINUED | OUTPATIENT
Start: 2022-09-13 | End: 2022-09-13 | Stop reason: HOSPADM

## 2022-09-13 RX ORDER — CIPROFLOXACIN 500 MG/1
500 TABLET, FILM COATED ORAL 2 TIMES DAILY
Qty: 6 TABLET | Refills: 0 | Status: SHIPPED | OUTPATIENT
Start: 2022-09-13 | End: 2022-09-16

## 2022-09-13 RX ORDER — SODIUM CHLORIDE, SODIUM LACTATE, POTASSIUM CHLORIDE, CALCIUM CHLORIDE 600; 310; 30; 20 MG/100ML; MG/100ML; MG/100ML; MG/100ML
1000 INJECTION, SOLUTION INTRAVENOUS CONTINUOUS
Status: DISCONTINUED | OUTPATIENT
Start: 2022-09-13 | End: 2022-09-13 | Stop reason: HOSPADM

## 2022-09-13 RX ORDER — ROCURONIUM BROMIDE 10 MG/ML
INJECTION, SOLUTION INTRAVENOUS PRN
Status: DISCONTINUED | OUTPATIENT
Start: 2022-09-13 | End: 2022-09-13 | Stop reason: SDUPTHER

## 2022-09-13 RX ADMIN — SODIUM CHLORIDE, POTASSIUM CHLORIDE, SODIUM LACTATE AND CALCIUM CHLORIDE 1000 ML: 600; 310; 30; 20 INJECTION, SOLUTION INTRAVENOUS at 11:38

## 2022-09-13 RX ADMIN — FENTANYL CITRATE 100 MCG: 50 INJECTION, SOLUTION INTRAMUSCULAR; INTRAVENOUS at 12:29

## 2022-09-13 RX ADMIN — Medication 3 MG: at 13:17

## 2022-09-13 RX ADMIN — PROPOFOL 200 MG: 10 INJECTION, EMULSION INTRAVENOUS at 12:29

## 2022-09-13 RX ADMIN — HYDROMORPHONE HYDROCHLORIDE 0.5 MG: 1 INJECTION, SOLUTION INTRAMUSCULAR; INTRAVENOUS; SUBCUTANEOUS at 13:39

## 2022-09-13 RX ADMIN — SODIUM CHLORIDE, POTASSIUM CHLORIDE, SODIUM LACTATE AND CALCIUM CHLORIDE: 600; 310; 30; 20 INJECTION, SOLUTION INTRAVENOUS at 13:11

## 2022-09-13 RX ADMIN — CEFAZOLIN 3000 MG: 1 INJECTION, POWDER, FOR SOLUTION INTRAMUSCULAR; INTRAVENOUS at 12:43

## 2022-09-13 RX ADMIN — PROPOFOL 50 MG: 10 INJECTION, EMULSION INTRAVENOUS at 13:08

## 2022-09-13 RX ADMIN — LIDOCAINE HYDROCHLORIDE 50 MG: 10 INJECTION, SOLUTION EPIDURAL; INFILTRATION; INTRACAUDAL; PERINEURAL at 12:29

## 2022-09-13 RX ADMIN — ROCURONIUM BROMIDE 50 MG: 10 INJECTION, SOLUTION INTRAVENOUS at 12:29

## 2022-09-13 RX ADMIN — DEXAMETHASONE SODIUM PHOSPHATE 5 MG: 10 INJECTION, SOLUTION INTRAMUSCULAR; INTRAVENOUS at 12:44

## 2022-09-13 RX ADMIN — GLYCOPYRROLATE 0.4 MG: 0.2 INJECTION INTRAMUSCULAR; INTRAVENOUS at 13:17

## 2022-09-13 RX ADMIN — ONDANSETRON 4 MG: 2 INJECTION INTRAMUSCULAR; INTRAVENOUS at 13:02

## 2022-09-13 ASSESSMENT — PAIN DESCRIPTION - LOCATION
LOCATION: PENIS;PERINEUM
LOCATION: ABDOMEN;PERINEUM;PENIS
LOCATION: PENIS;PERINEUM
LOCATION: PERINEUM;PENIS

## 2022-09-13 ASSESSMENT — PAIN SCALES - GENERAL
PAINLEVEL_OUTOF10: 8
PAINLEVEL_OUTOF10: 6
PAINLEVEL_OUTOF10: 6
PAINLEVEL_OUTOF10: 0
PAINLEVEL_OUTOF10: 5

## 2022-09-13 ASSESSMENT — PAIN DESCRIPTION - DESCRIPTORS
DESCRIPTORS: PRESSURE

## 2022-09-13 ASSESSMENT — PAIN - FUNCTIONAL ASSESSMENT: PAIN_FUNCTIONAL_ASSESSMENT: 0-10

## 2022-09-13 NOTE — ANESTHESIA POSTPROCEDURE EVALUATION
Department of Anesthesiology  Postprocedure Note    Patient: Eduar Harmon  MRN: [id number]  YOB: 1955  Date of evaluation: 9/13/2022      Procedure Summary     Date: 09/13/22 Room / Location: [address]    Anesthesia Start: 3966 Anesthesia Stop: 4506    Procedure: CYSTOSCOPY, TUR BLADDER TUMOR (GYRUS), RIGHT URETERAL STENT PLACEMENT (Bladder) Diagnosis:       Bladder mass      (BLADDER MASS)    Surgeons: Patsy Ott MD Responsible Provider: Truman Ortiz MD    Anesthesia Type: general ASA Status: 3          Anesthesia Type: No value filed.     Ann Marie Phase I: Ann Marie Score: 10    Ann Marie Phase II:        Anesthesia Post Evaluation    Patient location during evaluation: PACU  Patient participation: complete - patient participated  Level of consciousness: awake and alert  Pain score: 3  Airway patency: patent  Nausea & Vomiting: no nausea and no vomiting  Cardiovascular status: hemodynamically stable  Respiratory status: acceptable  Hydration status: stable

## 2022-09-13 NOTE — OP NOTE
Dr. Yani Schmitt MD      [facility]    Patient: Ana Cristina Morales   : 1955  MRN: [id number]     DATE:  2022     SURGEON:   Yani Schmitt MD    ASSISTANT:  Hannah Archer MD PGY3    PREOPERATIVE DIAGNOSIS:  BLADDER MASS    POSTOPERATIVE DIAGNOSIS:  same    PROCEDURE PERFORMED:  CYSTOSCOPY, TUR BLADDER TUMOR (GYRUS), RIGHT URETERAL STENT PLACEMENT  Large >5cm tumor     ANESTHESIA:  General    COMPLICATIONS:  None. ESTIMATED BLOOD LOSS:  * No values recorded between 2022 12:23 PM and 2022  1:24 PM *     SPECIMENS:  ID Type Source Tests Collected by Time Destination   A : BLADDER TUMOR Tissue Bladder SURGICAL PATHOLOGY Yani Schmitt MD 2022 1314           DRAINS:  22Fr 3-way catheter     INDICATIONS FOR PROCEDURE:  The patient is a 69 y.o. male who presents with a bladder tumor >5cm found on cysto. He is here today for resection of bladder tumor. The risks and benefits of the procedure as well as possible alternatives and complications were discussed and he consented    DETAILS OF THE PROCEDURE:  The patient was correctly identified in the preoperative holding area. he was brought back to the operating room and placed in the dorsal lithotomy position. EPC cuffs were on, in place, and fully functional. General endotracheal anesthesia was administered. he was given Ancef 2g for antibiotic prophylaxis. The patient was then prepped and draped in the usual sterile fashion. After appropriate time-out was performed with all parties agreeing, the visual obturator was inserted into the bladder. A thorough and complete cystoscopy was then performed which showed bladder mass R lateral wall and base next to UO. The largest tumor was >5cm in size. The ureteral orifices were patent in the orthotopic location. Not involved in tumor but close to it on R side. The resectoscope was then inserted and used to resect the tumors.   The resection did appear to obtain muscle. There did not appear to be residual tumor. All bleeding areas were fulgurated and hemostasis was visualized. A glide wire was then placed up to renal pelvis on right ureter through orifice. We then cannulated over with a 6x30 ureteral stent in good position with proximal and distal curl. The bladder was then drained and the cystoscope was removed. A 22Fr 3-way catheter  catheter was inserted. Dr Rohit Brito MD was scrubbed and present for the procedure. The patient tolerated the procedure well and was sent to PACU for postoperative monitoring. DISPOSITION:  The patient was discharged home in stable condition  Harrison removal Thursday. Follow up: 2 weeks to review pathology.

## 2022-09-13 NOTE — DISCHARGE INSTRUCTIONS
Transurethral resection of Bladder Tumor: The patient will be discharged home with sylvester. May restart your aspirin in 72 hrs if you take this medication. You also have a ureteral stent (a tube placed in the tube that connects kidney to bladder) as it was very close to your bladder mass. You will feel urgency to urinate, frequency, bladder spasms and discomfort with this stent. That is expected. You may see blood in the urine after the procedure. This should resolve over the next couple days. Please stay hydrated. If the blood in the urine becomes significant, and doesn't improve to a clear/pink appearance, please call. You may experience frequency/urgency of urination after the procedure. We expect these symptoms to improve over the next couple weeks. Tylenol for pain control  Patient ok to discharge home in good condition  No heavy lifting, >10 lbs for today  Patient should avoid strenuous activity for today  Patient should walk moderately at home  Patient ok to shower   Patient may resume diet as tolerated  Patient should take Rx as directed  No driving while on narcotics or with catheter in place   Please call attending physician or hospital  with questions  Call or Present to ED if fever (> 101F), intractable nausea vomiting or pain. Pt should follow up with Dr. Víctor Jacobo 9/15 to have catheter removed, call to confirm appointment    Home with sylvester catheter. Please teach sylvester education and send home with leg and night bag. You may see intermittent blood in the urine while the catheter in place. If the catheter becomes obstructed and needs to be exchanged, please call. No alcoholic beverages, no driving or operating machinery, no making important decisions for 24 hours. You may have a normal diet but should eat lightly day of surgery. Drink plenty of fluids.   Urinate within 8 hours after surgery, if unable to urinate call your doctor    Call your doctor for the following:   Chills   Temperature greater than 101   Pain that is not tolerable despite taking pain medicine as ordered   There is increased swelling, redness or warmth at surgical site   There is increased drainage or bleeding from surgical site   Do not remove surgical dressing unless instructed to do so by your surgeon

## 2022-09-13 NOTE — PLAN OF CARE
Discharge instructions reviewed with patient spouse and daughter . Harrison education completed. Patient to  prescriptions from [place] in [place].  No questions or concerns

## 2022-09-13 NOTE — H&P
Urology History and Physical    Patient:  Ca Craft  MRN: [id number]  YOB: 1955    CHIEF COMPLAINT:  bladder mass    HISTORY OF PRESENT ILLNESS:   The patient is a 69 y.o. male  found on cysto 6/2022 large tumor right lateral wall. No bladder diverticulum. There was mild trabeculation noted. Here for cysto TURBT. Patient's old records, notes and chart reviewed and summarized above. Past Medical History:    Past Medical History:   Diagnosis Date    Allergic rhinitis     Anxiety     Arthritis     Gilliam esophagus     RESOLVED    Basal cell carcinoma of left upper arm 02/09/2018    Bladder mass     Bleeding ulcer     Also states hx of \"perforated stomach\" in 1985    Chest tightness     Chronic pain of right knee     Colon polyps     COPD (chronic obstructive pulmonary disease) (HCC)     Duodenal hemorrhage     Environmental allergies     GERD (gastroesophageal reflux disease)     Gout     RT HAND    H. pylori infection     History of blood transfusion 1985    DUODENAL BLEED    Hyperlipidemia     Hyperplastic colon polyp     Hypertension     Lung nodule     has yearly follow up CT scans    Mild sleep apnea     no machine    Palpitations     Perforated stomach ([address] 1985    Pure hypercholesterolemia     Tubular adenoma of colon 01/2012     Dr. Heri Nicolas colonoscopy    Under care of team 06/21/2022    UROLOGY - DR. Aguilar 192 - LAST VISIT 6/2022    Under care of team 06/21/2022    PULMONOLOGY- DR. Allen December - LAST VISIT 4/2022    Under care of team 06/21/2022    ORTHO - DR. BROWER - LAST VISIT 4/2022    Under care of team 06/21/2022    GI - DR. DELGADILLO    Under care of team     CARDIOLOGY - DR. Kanwal Martini    Wears glasses     Wears partial dentures     Bottom    Wellness examination 06/21/2022    PCP - Brodie Kong CNP - LAST VISIT - 5/2022       Past Surgical History:    Past Surgical History:   Procedure Laterality Date    APPENDECTOMY      CARDIAC CATHETERIZATION  08/17/2022    COLONOSCOPY      COLONOSCOPY 2012    COLONOSCOPY N/A 2019    COLONOSCOPY POLYPECTOMY SNARE/COLD BIOPSY performed by Sherwin Crow MD at [address] ARTHROSCOPY Right 2022    KNEE ARTHROSCOPIC PARTIAL LATERAL AND PARTIAL MEDIAL MENISCECTOMY performed by Joseph De Leon MD at [facility]  10/05/2018    SKIN CANCER EXCISION      STOMACH SURGERY      Perforated Stomach repair    TONSILLECTOMY      UMBILICAL HERNIA REPAIR  2011    UPPER GASTROINTESTINAL ENDOSCOPY      UPPER GASTROINTESTINAL ENDOSCOPY N/A 2019    EGD BIOPSY performed by Sherwin Crow MD at [facility] OR       Medications:    Scheduled Meds:   triamcinolone acetonide  40 mg IntraMUSCular Once     Continuous Infusions:  PRN Meds:. Allergies:  Compazine spansule [prochlorperazine], Metoclopramide, and Seasonal    Social History:    Social History     Socioeconomic History    Marital status:      Spouse name: Not on file    Number of children: Not on file    Years of education: Not on file    Highest education level: Not on file   Occupational History    Not on file   Tobacco Use    Smoking status: Former     Packs/day: 1.00     Years: 30.00     Pack years: 30.00     Types: Cigarettes     Start date: 5     Quit date: 2014     Years since quittin.7    Smokeless tobacco: Never   Vaping Use    Vaping Use: Never used   Substance and Sexual Activity    Alcohol use:  Yes     Alcohol/week: 6.0 standard drinks     Types: 6 Cans of beer per week     Comment: 2-6 cans beer/day    Drug use: No    Sexual activity: Not on file   Other Topics Concern    Not on file   Social History Narrative    Not on file     Social Determinants of Health     Financial Resource Strain: Low Risk     Difficulty of Paying Living Expenses: Not hard at all   Food Insecurity: No Food Insecurity    Worried About Running Out of Food in the Last Year: Never true    Ran Out of Food in the Last Year: Never true   Transportation Needs: Not on file Physical Activity: Not on file   Stress: Not on file   Social Connections: Not on file   Intimate Partner Violence: Not on file   Housing Stability: Not on file       Family History:    Family History   Problem Relation Age of Onset    Cancer Mother         Thyroid CA-  age 39    Cancer Sister         Thyroid CA    Thyroid Disease Sister     Heart Attack Paternal Grandfather     Emphysema Paternal Grandfather         Smoker         REVIEW OF SYSTEMS:  14 point review of systems negative other than HPI      Physical Exam:    This a 69 y.o. male   No data found. Constitutional: Patient in no acute distress; Neuro: alert and oriented to person place and time. Psych: Mood and affect normal.  Skin: Normal  Lungs: Respiratory effort normal  Cardiovascular:  Normal peripheral pulses  Abdomen: Soft, non-tender, non-distended   Bladder non-tender and not distended. LE no edema/TTP    LABS:  No results for input(s): WBC, HGB, HCT, MCV, PLT in the last 72 hours. No results for input(s): NA, K, CL, CO2, PHOS, BUN, CREATININE, CA in the last 72 hours. Lab Results   Component Value Date    PSA 1.27 2022    PSA 1.76 2021    PSA 1.34 10/28/2020       Additional Lab/culture results:  No new     Urinalysis: No results for input(s): COLORU, PHUR, LABCAST, WBCUA, RBCUA, MUCUS, TRICHOMONAS, YEAST, BACTERIA, CLARITYU, SPECGRAV, LEUKOCYTESUR, UROBILINOGEN, BILIRUBINUR, BLOODU in the last 72 hours.     Invalid input(s): NITRATE, GLUCOSEUKETONESUAMORPHOUS     -----------------------------------------------------------------  Imaging Results:  No new     Assessment and Plan   Impression:    68 yo M  Bladder mass  Patient Active Problem List   Diagnosis    Allergic rhinitis    Anxiety    Hyperplastic colon polyp    Gilliam's esophagus without dysplasia    GERD (gastroesophageal reflux disease)    Bleeding ulcer    Environmental allergies    Tubular adenoma of colon    History of tobacco abuse    Essential hypertension    Pure hypercholesterolemia    Chronic obstructive pulmonary disease (HCC)    Bilateral hearing loss    Tinnitus aurium    Acute pain of right shoulder    Basal cell carcinoma of left upper arm    Seasonal allergic rhinitis due to pollen    Cold sore    Ventral hernia without obstruction or gangrene    Pulmonary nodules    Mass of right thigh    Osteopenia of right lower leg    Chronic pain of right knee    Hyperglycemia    Localized swelling of lower extremity    Class 2 drug-induced obesity with serious comorbidity and body mass index (BMI) of 38.0 to 38.9 in adult    Class 2 severe obesity due to excess calories with serious comorbidity and body mass index (BMI) of 38.0 to 38.9 in adult [facility])    Right hand pain    Basal cell carcinoma (BCC) of left upper arm       Plan:   NPO  Abx   TURBT   Consent       Jagdeep Jiménez MD  8:12 AM 9/13/2022

## 2022-09-13 NOTE — ANESTHESIA PRE PROCEDURE
Department of Anesthesiology  Preprocedure Note       Name:  Bibiana Cade   Age:  69 y.o.  :  1955                                          MRN:  [id number]         Date:  2022      Surgeon: Cheikh Rose):  Son Beaver MD    Procedure: Procedure(s):  CYSTOSCOPY, TUR BLADDER TUMOR (GYRUS)    Medications prior to admission:   Prior to Admission medications    Medication Sig Start Date End Date Taking? Authorizing Provider   ALPRAZolam (XANAX) 0.25 MG tablet Take 0.25 mg by mouth nightly as needed for Sleep. Historical Provider, MD   fluticasone [facility]) 50 MCG/ACT nasal spray 2 sprays by Nasal route daily 22  VIKY Paulino CNP   hydroCHLOROthiazide (HYDRODIURIL) 25 MG tablet Take 1 tablet by mouth every morning 22   VIKY Paulino CNP   SPIRIVA RESPIMAT 2.5 MCG/ACT AERS inhaler USE 2 INHALATIONS DAILY 4/15/22   Frederic Madden MD   amLODIPine (NORVASC) 10 MG tablet Take 10 mg by mouth at bedtime     Historical Provider, MD   TART CHERRY PO Take 1 capsule by mouth daily    Historical Provider, MD   lansoprazole (PREVACID) 30 MG delayed release capsule TAKE 1 CAPSULE DAILY 22   VIKY Paulino CNP   simvastatin (ZOCOR) 20 MG tablet TAKE 1 TABLET NIGHTLY 10/18/21   VIKY Paulino CNP   vitamin D3 (CHOLECALCIFEROL) 25 MCG (1000 UT) TABS tablet Take 1 tablet by mouth daily 3/19/21   VIKY Paulino CNP   aspirin 81 MG tablet Take 81 mg by mouth daily      Historical Provider, MD       Current medications:    No current facility-administered medications for this encounter. Allergies:     Allergies   Allergen Reactions    Compazine Spansule [Prochlorperazine]      MUSCULAR DYSTONIA    Metoclopramide      MUSCULAR DYSTONIA    Seasonal        Problem List:    Patient Active Problem List   Diagnosis Code    Allergic rhinitis J30.9    Anxiety F41.9    Hyperplastic colon polyp K63.5    Gilliam's esophagus without dysplasia K22.70    GERD (gastroesophageal reflux disease) K21.9    Bleeding ulcer K27.4    Environmental allergies     Tubular adenoma of colon D12.6    History of tobacco abuse Z87.891    Essential hypertension I10    Pure hypercholesterolemia E78.00    Chronic obstructive pulmonary disease (HCC) J44.9    Bilateral hearing loss H91.93    Tinnitus aurium H93.19    Acute pain of right shoulder M25.511    Basal cell carcinoma of left upper arm C44.619    Seasonal allergic rhinitis due to pollen J30.1    Cold sore B00.1    Ventral hernia without obstruction or gangrene K43.9    Pulmonary nodules R91.8    Mass of right thigh R22.41    Osteopenia of right lower leg M85.861    Chronic pain of right knee M25.561, G89.29    Hyperglycemia R73.9    Localized swelling of lower extremity M79.89    Class 2 drug-induced obesity with serious comorbidity and body mass index (BMI) of 38.0 to 38.9 in adult E66.1, Z68.38    Class 2 severe obesity due to excess calories with serious comorbidity and body mass index (BMI) of 38.0 to 38.9 in adult (HCC) E66.01, Z68.38    Right hand pain M79.641    Basal cell carcinoma (BCC) of left upper arm C44.619       Past Medical History:        Diagnosis Date    Allergic rhinitis     Anxiety     Arthritis     Gilliam esophagus     RESOLVED    Basal cell carcinoma of left upper arm 02/09/2018    Bladder mass     Bleeding ulcer     Also states hx of \"perforated stomach\" in 1985    Chest tightness     Chronic pain of right knee     Colon polyps     COPD (chronic obstructive pulmonary disease) (HCC)     Duodenal hemorrhage     Environmental allergies     GERD (gastroesophageal reflux disease)     Gout     RT HAND    H. pylori infection     History of blood transfusion 1985    DUODENAL BLEED    Hyperlipidemia     Hyperplastic colon polyp     Hypertension     Lung nodule     has yearly follow up CT scans    Mild sleep apnea     no machine    Palpitations     Perforated stomach ([address] 1985    Pure Encounters:   08/30/22 (!) 165/89   06/21/22 (!) 155/84   06/20/22 (!) 156/69       NPO Status:                                                                                 BMI:   Wt Readings from Last 3 Encounters:   08/30/22 283 lb (128.4 kg)   06/21/22 283 lb (128.4 kg)   06/02/22 275 lb (124.7 kg)     There is no height or weight on file to calculate BMI.    CBC:   Lab Results   Component Value Date/Time    WBC 8.2 06/08/2022 09:05 AM    RBC 4.51 06/08/2022 09:05 AM    RBC 4.83 12/05/2011 09:33 AM    HGB 14.4 06/08/2022 09:05 AM    HCT 44.4 06/08/2022 09:05 AM    MCV 98.4 06/08/2022 09:05 AM    RDW 12.7 06/08/2022 09:05 AM     06/08/2022 09:05 AM     12/05/2011 09:33 AM       CMP:   Lab Results   Component Value Date/Time     06/20/2022 04:48 PM    K 4.3 06/20/2022 04:48 PM     06/20/2022 04:48 PM    CO2 24 06/20/2022 04:48 PM    BUN 15 06/20/2022 04:48 PM    CREATININE 0.96 06/20/2022 04:48 PM    GFRAA >60 06/20/2022 04:48 PM    LABGLOM >60 06/20/2022 04:48 PM    GLUCOSE 97 06/20/2022 04:48 PM    GLUCOSE 93 01/23/2012 08:57 AM    PROT 7.1 05/04/2022 08:55 AM    CALCIUM 9.0 06/20/2022 04:48 PM    BILITOT 0.76 05/04/2022 08:55 AM    ALKPHOS 78 05/04/2022 08:55 AM    AST 16 05/04/2022 08:55 AM    ALT 16 05/04/2022 08:55 AM       POC Tests: No results for input(s): POCGLU, POCNA, POCK, POCCL, POCBUN, POCHEMO, POCHCT in the last 72 hours.     Coags: No results found for: PROTIME, INR, APTT    HCG (If Applicable): No results found for: PREGTESTUR, PREGSERUM, HCG, HCGQUANT     ABGs: No results found for: PHART, PO2ART, GXO9TAQ, BWY7WQM, BEART, J5CIPCLS     Type & Screen (If Applicable):  No results found for: LABABO, LABRH    Drug/Infectious Status (If Applicable):  No results found for: HIV, HEPCAB    COVID-19 Screening (If Applicable): No results found for: COVID19        Anesthesia Evaluation  Patient summary reviewed and Nursing notes reviewed no history of anesthetic complications: Airway: Mallampati: III  TM distance: >3 FB   Neck ROM: full  Mouth opening: > = 3 FB   Dental:    (+) partials      Pulmonary:normal exam    (+) COPD:  sleep apnea:                            ROS comment: 30 pack year smoker quit 2014   Cardiovascular:    (+) hypertension:, hyperlipidemia                  Neuro/Psych:               GI/Hepatic/Renal:   (+) GERD:,           Endo/Other:                      ROS comment: Bladder mass  6 drinks per week Abdominal:             Vascular: Other Findings:           Anesthesia Plan      general     ASA 3       Induction: intravenous. MIPS: Postoperative opioids intended and Prophylactic antiemetics administered. Anesthetic plan and risks discussed with patient. Plan discussed with CRNA.                     Esmer Chambers MD   9/13/2022

## 2022-09-15 ENCOUNTER — TELEPHONE (OUTPATIENT)
Dept: UROLOGY | Age: 67
End: 2022-09-15

## 2022-09-15 ENCOUNTER — PROCEDURE VISIT (OUTPATIENT)
Dept: UROLOGY | Age: 67
End: 2022-09-15

## 2022-09-15 VITALS — TEMPERATURE: 97 F | HEIGHT: 71 IN | WEIGHT: 277 LBS | BODY MASS INDEX: 38.78 KG/M2

## 2022-09-15 DIAGNOSIS — R31.0 GROSS HEMATURIA: Primary | ICD-10-CM

## 2022-09-15 LAB — SURGICAL PATHOLOGY REPORT: NORMAL

## 2022-09-15 PROCEDURE — 99999 PR OFFICE/OUTPT VISIT,PROCEDURE ONLY: CPT | Performed by: NURSE PRACTITIONER

## 2022-09-15 NOTE — TELEPHONE ENCOUNTER
Cysto, TURBT, (RT) stent removal @ [facility] 10/07/22 10:00am **STOP BLOOD THINNERS 9/30/22**  PAT same day             Spoke with patient, procedure info to be given at 9/20/22 OV w/ Dr. See Miller

## 2022-09-15 NOTE — PROGRESS NOTES
Patient presents to office for 22F catheter removal. Balloon deflated and catheter was removed with no complications. Patient tolerated procedure well. Patient will keep follow up appointment with provider and will call the office with any questions or concerns.

## 2022-09-22 ENCOUNTER — OFFICE VISIT (OUTPATIENT)
Dept: UROLOGY | Age: 67
End: 2022-09-22
Payer: MEDICARE

## 2022-09-22 VITALS
SYSTOLIC BLOOD PRESSURE: 130 MMHG | HEIGHT: 71 IN | WEIGHT: 277 LBS | BODY MASS INDEX: 38.78 KG/M2 | OXYGEN SATURATION: 95 % | HEART RATE: 64 BPM | DIASTOLIC BLOOD PRESSURE: 78 MMHG

## 2022-09-22 DIAGNOSIS — R30.0 DYSURIA: Primary | ICD-10-CM

## 2022-09-22 DIAGNOSIS — R31.0 GROSS HEMATURIA: ICD-10-CM

## 2022-09-22 DIAGNOSIS — C67.8 MALIGNANT NEOPLASM OF OVERLAPPING SITES OF BLADDER (HCC): Primary | ICD-10-CM

## 2022-09-22 PROCEDURE — 99214 OFFICE O/P EST MOD 30 MIN: CPT | Performed by: UROLOGY

## 2022-09-22 PROCEDURE — 1123F ACP DISCUSS/DSCN MKR DOCD: CPT | Performed by: UROLOGY

## 2022-09-22 ASSESSMENT — ENCOUNTER SYMPTOMS
WHEEZING: 0
SHORTNESS OF BREATH: 0
COUGH: 0

## 2022-09-22 NOTE — PROGRESS NOTES
[address] [facility] [address] [id number]  [facility] - Established    Patient:  Teresa Carter  YOB: 1955  Date: 9/22/2022    The patient is a 69 y.o. male who presents todayfor evaluation of the following problems:   Chief Complaint   Patient presents with    Post-Op Check     turbt       HPI  Here for bladder mass. Had turbt, path reviewed. No hematuria    Summary of old records: N/A    Additional History: N/A    Procedures Today: N/A    Urinalysis today:  No results found for this visit on 09/22/22. Last several PSA's:  Lab Results   Component Value Date    PSA 1.27 06/08/2022    PSA 1.76 11/04/2021    PSA 1.34 10/28/2020     Last total testosterone:  No results found for: TESTOSTERONE    AUA Symptom Score (9/22/2022):                                Last BUN and creatinine:  Lab Results   Component Value Date    BUN 15 06/20/2022     Lab Results   Component Value Date    CREATININE 0.96 06/20/2022       Additional Lab/Culture results: none    Imaging Reviewed during this Office Visit: none  (results were independently reviewed by physician and radiology report verified)    PAST MEDICAL, FAMILY AND SOCIAL HISTORY UPDATE:  Past Medical History:   Diagnosis Date    Allergic rhinitis     Anxiety     Arthritis     Gilliam esophagus     RESOLVED    Basal cell carcinoma of left upper arm 02/09/2018    Bladder mass     Bleeding ulcer     Also states hx of \"perforated stomach\" in 1985    Chest tightness     Chronic pain of right knee     Colon polyps     COPD (chronic obstructive pulmonary disease) (HCC)     Duodenal hemorrhage     Environmental allergies     GERD (gastroesophageal reflux disease)     Gout     RT HAND    H. pylori infection     History of blood transfusion 1985    DUODENAL BLEED    Hyperlipidemia     Hyperplastic colon polyp     Hypertension     Lung nodule has yearly follow up CT scans    Mild sleep apnea     no machine    Palpitations     Perforated stomach ([address] 1985    Pure hypercholesterolemia     Tubular adenoma of colon 2012     Dr. Chavez Needs colonoscopy    Under care of team 2022    UROLOGY - DR. Charity Camacho - LAST VISIT 2022    Under care of team 2022    PULMONOLOGY- DR. Bill Robert - LAST VISIT 2022    Under care of team 2022    ORTHO - DR. BROWER - LAST VISIT 2022    Under care of team 2022    GI - DR. DELGADILLO    Under care of team     CARDIOLOGY - DR. Day Alarcon    Wears glasses     Wears partial dentures     Bottom    Wellness examination 2022    PCP - Nader Woodward CNP - LAST VISIT - 2022     Past Surgical History:   Procedure Laterality Date    APPENDECTOMY      CARDIAC CATHETERIZATION  2022    COLONOSCOPY      COLONOSCOPY  2012    COLONOSCOPY N/A 2019    COLONOSCOPY POLYPECTOMY SNARE/COLD BIOPSY performed by Leydi Haynes MD at [facility] N/A 2022    CYSTOSCOPY, TUR BLADDER TUMOR (GYRUS), RIGHT URETERAL STENT PLACEMENT performed by Fela Anaya MD at [address] ARTHROSCOPY Right 2022    KNEE ARTHROSCOPIC PARTIAL LATERAL AND PARTIAL MEDIAL MENISCECTOMY performed by Leidy Eldridge MD at [address]  10/05/2018    SKIN CANCER EXCISION      STOMACH SURGERY      Perforated Stomach repair    TONSILLECTOMY      TURP Right 2022    CYSTOSCOPY, TUR BLADDER TUMOR (GYRUS), RIGHT URETERAL STENT PLACEMENT    UMBILICAL HERNIA REPAIR  2011    UPPER GASTROINTESTINAL ENDOSCOPY      UPPER GASTROINTESTINAL ENDOSCOPY N/A 2019    EGD BIOPSY performed by Leydi Haynes MD at [address] [place] [address] History   Problem Relation Age of Onset    Cancer Mother         Thyroid CA-  age 39    Cancer Sister         Thyroid CA    Thyroid Disease Sister     Heart Attack Paternal Grandfather     Emphysema Paternal Grandfather         Smoker     Outpatient Medications Marked as Taking for the 22 encounter (Office Visit) with Daryn Good MD   Medication Sig Dispense Refill    aspirin 81 MG tablet Take 1 tablet by mouth daily Hold for 7 days after surgery 30 tablet 3    docusate sodium (COLACE) 100 MG capsule Take 1 capsule by mouth 2 times daily 60 capsule 0    ALPRAZolam (XANAX) 0.25 MG tablet Take 0.25 mg by mouth nightly as needed for Sleep.      hydroCHLOROthiazide (HYDRODIURIL) 25 MG tablet Take 1 tablet by mouth every morning 90 tablet 1    SPIRIVA RESPIMAT 2.5 MCG/ACT AERS inhaler USE 2 INHALATIONS DAILY 12 g 3    amLODIPine (NORVASC) 10 MG tablet Take 10 mg by mouth at bedtime       TART CHERRY PO Take 1 capsule by mouth daily      lansoprazole (PREVACID) 30 MG delayed release capsule TAKE 1 CAPSULE DAILY 90 capsule 3    simvastatin (ZOCOR) 20 MG tablet TAKE 1 TABLET NIGHTLY 90 tablet 3    vitamin D3 (CHOLECALCIFEROL) 25 MCG (1000 UT) TABS tablet Take 1 tablet by mouth daily 90 tablet 3       Compazine spansule [prochlorperazine], Metoclopramide, and Seasonal  Social History     Tobacco Use   Smoking Status Former    Packs/day: 1.00    Years: 30.00    Pack years: 30.00    Types: Cigarettes    Start date: 5    Quit date: 2014    Years since quittin.7   Smokeless Tobacco Never     (Ifpatient a smoker, smoking cessation counseling offered)    Social History     Substance and Sexual Activity   Alcohol Use Yes    Alcohol/week: 6.0 standard drinks    Types: 6 Cans of beer per week    Comment: 2-6 cans beer/day       REVIEW OF SYSTEMS:  Review of Systems    Physical Exam:      Vitals:    22 1043   BP: 130/78   Pulse: 64   SpO2: 95%     Body mass index is 38.63 kg/m². Patient is a 69 y.o. male in no acute distress and alert and oriented to person, place and time. Physical Exam  Constitutional: Patient in no acute distress. Neuro: Alert and oriented to person, place and time.   Psych: Mood normal, affect normal  Skin: No rash noted  HEENT: Head: Normocephalic andatraumatic  Conjunctivae and EOM are normal. Pupils are equal, round  Nose:Normal  Right External Ear: Normal; Left External Ear: Normal  Mouth: Mucosa Moist  Neck: Supple  Lungs: Respiratory effort is normal  Cardiovascular: Warm & Pink  Abdomen: Soft, non-tender, non-distended with no CVA,  No flank tenderness,  Or hepatosplenomegaly   Lymphatics: No palpablelymphadenopathy. Assessment and Plan      1. Malignant neoplasm of overlapping sites of bladder ([address]    2. Gross hematuria           Plan:     Cysto turbt at [facility] with stent removal same time  Do this in 3-4 weeks  Return for Surgery. Prescriptions Ordered:  No orders of the defined types were placed in this encounter. Orders Placed:  No orders of the defined types were placed in this encounter. Patsy Ott MD    Agree with the ROS entered by the MA.

## 2022-09-23 ENCOUNTER — TELEPHONE (OUTPATIENT)
Dept: PULMONOLOGY | Age: 67
End: 2022-09-23

## 2022-09-23 NOTE — TELEPHONE ENCOUNTER
Pt called and left a VM stating he has COVID and is not feeling well. Writer spoke with Dr. Kirsten Singletary and was informed the pt should stay hydrated and if he is feeling very sick he should be seen in the ED. He may need a CXR and to have his pulse ox checked. I informed the pt that Dr. Kirsten Singletary is out of town and is unable to access this computer right now. Pt was advised to f/u with PCP and follow Dr. Vivek Kaiser recommendations, listed above. Pt verbalized understanding and agrees with plan.

## 2022-09-27 ENCOUNTER — HOSPITAL ENCOUNTER (OUTPATIENT)
Age: 67
Setting detail: SPECIMEN
Discharge: HOME | End: 2022-09-27

## 2022-09-27 DIAGNOSIS — R30.0 DYSURIA: ICD-10-CM

## 2022-09-28 LAB
CULTURE: NORMAL
SPECIMEN DESCRIPTION: NORMAL

## 2022-10-06 ENCOUNTER — TELEPHONE (OUTPATIENT)
Dept: UROLOGY | Age: 67
End: 2022-10-06

## 2022-10-06 NOTE — TELEPHONE ENCOUNTER
Patient's procedure will need to be moved due to covid + result  Per Dr Demarco Gilbert ok to push procedure out 4 weeks per the anesthesiologist recommendation        Left voicemail for patient to callback for rescheduling

## 2022-10-07 ENCOUNTER — TELEPHONE (OUTPATIENT)
Dept: UROLOGY | Age: 67
End: 2022-10-07

## 2022-10-07 NOTE — TELEPHONE ENCOUNTER
Cysto, TURBT @ [facility] 11/04/22 7:45am **STOP BLOOD THINNERS 10/28/22**   PAT (Covid + 9/23/22)  @ [facility] 10/28/22 11:00am           Spoke with patient, procedure info given verbally.

## 2022-10-11 ENCOUNTER — OFFICE VISIT (OUTPATIENT)
Dept: PULMONOLOGY | Age: 67
End: 2022-10-11
Payer: MEDICARE

## 2022-10-11 ENCOUNTER — HOSPITAL ENCOUNTER (OUTPATIENT)
Age: 67
Setting detail: SPECIMEN
Discharge: HOME | End: 2022-10-11

## 2022-10-11 VITALS
DIASTOLIC BLOOD PRESSURE: 84 MMHG | TEMPERATURE: 97.1 F | BODY MASS INDEX: 38.5 KG/M2 | WEIGHT: 275 LBS | HEIGHT: 71 IN | SYSTOLIC BLOOD PRESSURE: 151 MMHG | HEART RATE: 76 BPM | OXYGEN SATURATION: 97 %

## 2022-10-11 DIAGNOSIS — Z87.891 HISTORY OF SMOKING AT LEAST 1 PACK PER DAY FOR AT LEAST 30 YEARS: ICD-10-CM

## 2022-10-11 DIAGNOSIS — E78.00 PURE HYPERCHOLESTEROLEMIA: ICD-10-CM

## 2022-10-11 DIAGNOSIS — G47.33 OSA (OBSTRUCTIVE SLEEP APNEA): ICD-10-CM

## 2022-10-11 DIAGNOSIS — J44.9 CHRONIC OBSTRUCTIVE PULMONARY DISEASE, UNSPECIFIED COPD TYPE (HCC): Primary | ICD-10-CM

## 2022-10-11 DIAGNOSIS — R91.1 LUNG NODULE: ICD-10-CM

## 2022-10-11 DIAGNOSIS — I10 ESSENTIAL HYPERTENSION: ICD-10-CM

## 2022-10-11 DIAGNOSIS — Z87.891 PERSONAL HISTORY OF TOBACCO USE: ICD-10-CM

## 2022-10-11 DIAGNOSIS — M79.641 RIGHT HAND PAIN: ICD-10-CM

## 2022-10-11 DIAGNOSIS — R73.9 HYPERGLYCEMIA: ICD-10-CM

## 2022-10-11 DIAGNOSIS — Z12.5 SCREENING FOR MALIGNANT NEOPLASM OF PROSTATE: ICD-10-CM

## 2022-10-11 DIAGNOSIS — E66.9 OBESITY (BMI 35.0-39.9 WITHOUT COMORBIDITY): ICD-10-CM

## 2022-10-11 DIAGNOSIS — M1A.0410 CHRONIC GOUT OF RIGHT HAND, UNSPECIFIED CAUSE: ICD-10-CM

## 2022-10-11 LAB
ALBUMIN SERPL-MCNC: 3.9 G/DL (ref 3.5–5.2)
ALBUMIN/GLOBULIN RATIO: 1.3 (ref 1–2.5)
ALP BLD-CCNC: 82 U/L (ref 40–129)
ALT SERPL-CCNC: 19 U/L (ref 5–41)
ANION GAP SERPL CALCULATED.3IONS-SCNC: 13 MMOL/L (ref 9–17)
AST SERPL-CCNC: 16 U/L
BILIRUB SERPL-MCNC: 0.8 MG/DL (ref 0.3–1.2)
BUN BLDV-MCNC: 15 MG/DL (ref 8–23)
C-REACTIVE PROTEIN: 25.2 MG/L (ref 0–5)
CALCIUM SERPL-MCNC: 9.3 MG/DL (ref 8.6–10.4)
CHLORIDE BLD-SCNC: 104 MMOL/L (ref 98–107)
CHOLESTEROL/HDL RATIO: 4.4
CHOLESTEROL: 189 MG/DL
CO2: 26 MMOL/L (ref 20–31)
CREAT SERPL-MCNC: 1.02 MG/DL (ref 0.7–1.2)
ESTIMATED AVERAGE GLUCOSE: 108 MG/DL
GFR SERPL CREATININE-BSD FRML MDRD: >60 ML/MIN/1.73M2
GLUCOSE BLD-MCNC: 100 MG/DL (ref 70–99)
HBA1C MFR BLD: 5.4 % (ref 4–6)
HCT VFR BLD CALC: 44.8 % (ref 40.7–50.3)
HDLC SERPL-MCNC: 43 MG/DL
HEMOGLOBIN: 14.8 G/DL (ref 13–17)
LDL CHOLESTEROL: 116 MG/DL (ref 0–130)
MCH RBC QN AUTO: 31 PG (ref 25.2–33.5)
MCHC RBC AUTO-ENTMCNC: 33 G/DL (ref 28.4–34.8)
MCV RBC AUTO: 93.7 FL (ref 82.6–102.9)
NRBC AUTOMATED: 0 PER 100 WBC
PDW BLD-RTO: 12.2 % (ref 11.8–14.4)
PLATELET # BLD: 236 K/UL (ref 138–453)
PMV BLD AUTO: 10.4 FL (ref 8.1–13.5)
POTASSIUM SERPL-SCNC: 5.1 MMOL/L (ref 3.7–5.3)
PROSTATE SPECIFIC ANTIGEN: 1.22 NG/ML
RBC # BLD: 4.78 M/UL (ref 4.21–5.77)
SEDIMENTATION RATE, ERYTHROCYTE: 7 MM/HR (ref 0–20)
SODIUM BLD-SCNC: 143 MMOL/L (ref 135–144)
TOTAL PROTEIN: 7 G/DL (ref 6.4–8.3)
TRIGL SERPL-MCNC: 152 MG/DL
URIC ACID: 7.5 MG/DL (ref 3.4–7)
WBC # BLD: 10.6 K/UL (ref 3.5–11.3)

## 2022-10-11 PROCEDURE — G0296 VISIT TO DETERM LDCT ELIG: HCPCS | Performed by: INTERNAL MEDICINE

## 2022-10-11 PROCEDURE — 99214 OFFICE O/P EST MOD 30 MIN: CPT | Performed by: INTERNAL MEDICINE

## 2022-10-11 PROCEDURE — 1123F ACP DISCUSS/DSCN MKR DOCD: CPT | Performed by: INTERNAL MEDICINE

## 2022-10-11 NOTE — LETTER
[address] [facility] [address]  Phone: [phone]  Fax: [phone]    Tong Salomon MD    October 11, 2022     Dillan Maharaj, VIKY - CNP  [address] [address]  [address] [facility] [address]    Patient: Christi Yap   MR Number: [id number]   YOB: 1955   Date of Visit: 10/11/2022       Dear Dillan Maharaj: Thank you for referring Smith Bourgeois to me for evaluation/treatment. Below are the relevant portions of my assessment and plan of care. If you have questions, please do not hesitate to call me. I look forward to following George along with you.     Sincerely,      Tong Salomon MD

## 2022-10-11 NOTE — PROGRESS NOTES
OUTPATIENT PULMONARY PROGRESS NOTE      Patient:  Christopher Snellen  MRN: [id number]    Consulting Physician: Armando Ross  Reason for Consult: COPD  Primacy Care Physician: Armando Ross, VIKY - CNP    HISTORY OF PRESENT ILLNESS:   The patient is a 69 y.o. male   He is therefore follow-up of COPD lung nodule, history of chronic sinusitis and obstructive sleep apnea    According patient since she was seen last time 6 months ago he had a lot happen. He was having hematuria found to have urinary bladder mass/cancer seen by urology and had cystoscopy and TUR bladder tumor and right ureteral stent placement on 09/13/2022. He was also found to have positive stress test for possible inferior wall ischemia in August and had to have cardiac catheterization done before surgery he had cardiac catheterization done by Dr. Kathi Mayfield in [address] [place] [address] and according to report he has normal coronary arteries and normal LV function at that time no significant coronary artery disease was found. While he was in the hospital 5 days later after surgery was found to have COVID-positive. According to patient he was mostly having sore throat did not have much shortness of breath mild cough and he had called the office was given prescription for Paxlovid. 2 days after starting the medication he was feeling better. Since he was seen last time he did not have any exacerbation no steroid or antibiotic use. He does not complain of shortness of breath on regular activities he does not have his stairs at home. He has shortness of breath only on exertion activity. He does not complain of daily or persistent cough. Denies a sputum production. He denies nocturnal awakening with cough wheezing chest tightness or shortness of breath. His pedal edema is chronic without much change denies PND has chronic gout 1-2 pillow uses during sleep without much change.   He is taking Spiriva 2 puff once daily denies taking albuterol does not think that he needs albuterol. He had history of obstructive sleep apnea/mild obstructive sleep apnea on previous sleep study he did not want to be on CPAP last time when I talked to him I wrote for sleep study with titration but according to patient he was busy with other procedures and in the hospital so he did not get it done. He did complain of awakening at night he claimed that he wake up every few hours since he has a bladder problem he does not usually doze off or take nap during the daytime. Low-dose CT of the chest was done on 04/22/2022 Showed stable right middle lobe nodule and left fissure nodule and other areas of pleural thickening pleural nodule especially in upper lung field without much change as compared to CT scan of the chest on 04/05/2021. Previous work-up. CT scan done on 09/30/2020 for low-dose screening. Which showed 6 to 7 mm nodule in the right middle lobe on minor fissure which according to radiology stable he also have left lung nodule on the left major fissure. On my review the lung nodule in right middle lobe is about the same size possibly slight change in shape but there is a slight groundglass opacity just above the nodule which I was not able to see on CT scan on 09/25/2019. CT scan was ordered for the follow-up of groundglass opacity and was done on 04/05/2021 in the area of groundglass opacity look better other nodules are stable and not much change from 2019. Home sleep study was done in October which showed RDI of 6.2 consistent with mild obstructive sleep apnea. I have discussed with him the results of sleep study on previous visit. He did not think that he need CPAP. I had discussed with him about the option of CPAP titration study versus auto CPAP at home         Initial visit/previous visits summary  He was referred here for evaluation of COPD and he was scheduled for a screening CT scan of the chest and also for sleep study.   He was diagnosed with stage II COPD at that time and he was restarted on his Spiriva to spray once daily   He denies daily and persistent chronic cough and since he started using Spiriva despite and he use to have which was brown in color is more lighter and yellowish. Positive post nasal drip and nasal obstruction on R side and on Flonase nasal spray   He never was diagnosed with obstructive sleep apnea but he was told by his wife that he does have snoring he occasionally wake himself with snoring. He denies having any witnessed apnea sometimes wake up with choking sensation. He does have dry mouth when he wake up, but denies any fatigue and tiredness during the daytime and he denies any headache in the morning. He denies unrefreshed sleep he does have history of weight gain. He denies forgetfulness or decreased concentration.         ESS is     Sleep Medicine 4/26/2022 4/13/2021 10/6/2020 10/30/2018 1/3/2018   Sitting and reading 1 0 0 0 0   Watching TV 1 0 0 0 0   Sitting, inactive in a public place (e.g. a theatre or a meeting) 0 0 0 0 0   As a passenger in a car for an hour without a break 0 0 0 0 0   Lying down to rest in the afternoon when circumstances permit 2 0 0 0 0   Sitting and talking to someone 0 0 0 0 0   Sitting quietly after a lunch without alcohol 2 0 0 0 0   In a car, while stopped for a few minutes in traffic 0 0 0 0 0   [place] Sleepiness Score 6 0 0 0 0       Past Medical History:        Diagnosis Date    Allergic rhinitis     Anxiety     Arthritis     Gilliam esophagus     RESOLVED    Basal cell carcinoma of left upper arm 02/09/2018    bladder    Bladder mass     Bleeding ulcer     Also states hx of \"perforated stomach\" in 1985    Chest tightness     Chronic pain of right knee     Colon polyps     COPD (chronic obstructive pulmonary disease) ([facility]     Duodenal hemorrhage     Environmental allergies     GERD (gastroesophageal reflux disease)     Gout     RT HAND    H. pylori infection     History of blood transfusion 1985 DUODENAL BLEED/ no reaction    Hyperlipidemia     Hyperplastic colon polyp     Hypertension     Insomnia     Lung nodule     has yearly follow up CT scans    Mild sleep apnea     no machine    Palpitations     Perforated stomach ([address] 1985    Pure hypercholesterolemia     Tubular adenoma of colon 01/2012     Dr. Sukumar Fang colonoscopy    Under care of team 06/21/2022    UROLOGY - DR. Aguilar 192 - LAST VISIT 6/2022    Under care of team 06/21/2022    PULMONOLOGY- DR. Kelly Fowler - LAST VISIT 4/2022    Under care of team 06/21/2022    ORTHO - DR. BROWER - LAST VISIT 4/2022    Under care of team 06/21/2022    GI - DR. DELGADILLO    Under care of team     CARDIOLOGY - DR. JUAREZ/ last seen 8-2022    Wears glasses     Wears glasses     Wears partial dentures     Bottom    Wellness examination 06/21/2022    PCP Chelly Ferrell CNP - LAST VISIT - 5/2022    Wellness examination     PCP Mary Caraballo CNP/ [place]/ last seen 4-2022       Past Surgical History:        Procedure Laterality Date    APPENDECTOMY      CARDIAC CATHETERIZATION  08/17/2022    COLONOSCOPY      COLONOSCOPY  01/06/2012    COLONOSCOPY N/A 04/29/2019    COLONOSCOPY POLYPECTOMY SNARE/COLD BIOPSY performed by Malcolm Mcneal MD at [facility] 9/13/2022    CYSTOSCOPY, TUR BLADDER TUMOR (GYRUS), RIGHT URETERAL STENT PLACEMENT performed by Taco Lopez MD at [address] ARTHROSCOPY Right 04/07/2022    KNEE ARTHROSCOPIC PARTIAL LATERAL AND PARTIAL MEDIAL MENISCECTOMY performed by Kellie Tracy MD at [address]  10/05/2018    SKIN CANCER EXCISION      STOMACH SURGERY      Perforated Stomach repair    TONSILLECTOMY      TURP Right 09/13/2022    CYSTOSCOPY, TUR BLADDER TUMOR (GYRUS), RIGHT URETERAL STENT PLACEMENT    UMBILICAL HERNIA REPAIR  07/2011    UPPER GASTROINTESTINAL ENDOSCOPY      UPPER GASTROINTESTINAL ENDOSCOPY N/A 04/29/2019    EGD BIOPSY performed by Malcolm Mcneal MD at [address]:     Allergies   Allergen Reactions    Compazine Spansule [Prochlorperazine]      MUSCULAR DYSTONIA    Metoclopramide      MUSCULAR DYSTONIA    Seasonal          Home Meds:   Outpatient Encounter Medications as of 10/11/2022   Medication Sig Dispense Refill    aspirin 81 MG tablet Take 1 tablet by mouth daily Hold for 7 days after surgery (Patient taking differently: Take 81 mg by mouth daily Pt. Stopped 9-29-22 for OR 10-7-22/ Mgmt. PCP) 30 tablet 3    docusate sodium (COLACE) 100 MG capsule Take 1 capsule by mouth 2 times daily 60 capsule 0    ALPRAZolam (XANAX) 0.25 MG tablet Take 0.25 mg by mouth nightly as needed for Sleep. SPIRIVA RESPIMAT 2.5 MCG/ACT AERS inhaler USE 2 INHALATIONS DAILY 12 g 3    amLODIPine (NORVASC) 10 MG tablet Take 10 mg by mouth at bedtime       TART CHERRY PO Take 1 capsule by mouth daily      lansoprazole (PREVACID) 30 MG delayed release capsule TAKE 1 CAPSULE DAILY 90 capsule 3    simvastatin (ZOCOR) 20 MG tablet TAKE 1 TABLET NIGHTLY 90 tablet 3    vitamin D3 (CHOLECALCIFEROL) 25 MCG (1000 UT) TABS tablet Take 1 tablet by mouth daily 90 tablet 3    Hyoscyamine Sulfate SL (LEVSIN/SL) 0.125 MG SUBL Place 1 tablet under the tongue every 8 hours as needed (bladder spasm) 6 each 0    fluticasone (FLONASE) 50 MCG/ACT nasal spray 2 sprays by Nasal route daily 3 each 3    hydroCHLOROthiazide (HYDRODIURIL) 25 MG tablet Take 1 tablet by mouth every morning (Patient not taking: No sig reported) 90 tablet 1     Facility-Administered Encounter Medications as of 10/11/2022   Medication Dose Route Frequency Provider Last Rate Last Admin    triamcinolone acetonide (KENALOG-40) injection 40 mg  40 mg IntraMUSCular Once Skippy Maciel, APRN - CNP           Social History:   TOBACCO:   reports that he quit smoking about 3 years ago. He has a 40.00 pack-year smoking history. 1 PPD, He has never used smokeless tobacco.  ETOH:   reports current alcohol use of about 6.0 standard drinks per week.   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, office work    Family History:       Problem Relation Age of Onset    Cancer Mother         Thyroid CA-  age 39    Cancer Sister         Thyroid CA    Thyroid Disease Sister     Heart Attack Paternal Grandfather     Emphysema Paternal Grandfather         Smoker       Immunizations:    Immunization History   Administered Date(s) Administered    COVID-19, PFIZER GRAY top, DO NOT Dilute, (age 15 y+), IM, 30 mcg/0.3 mL 2022    COVID-19, PFIZER PURPLE top, DILUTE for use, (age 15 y+), 30mcg/0.3mL 2021, 03/15/2021, 2021    Influenza Vaccine, unspecified formulation 2016, 10/30/2018    Influenza Virus Vaccine 10/30/2013, 10/15/2014, 10/16/2015, 10/25/2017, 10/30/2018    Influenza, FLUAD, (age 72 y+), Adjuvanted, 0.5mL 10/30/2020, 2021    Influenza, High Dose (Fluzone 65 yrs and older) 2018    Influenza, Triv, inactivated, subunit, adjuvanted, IM (Fluad 65 yrs and older) 10/17/2019    Pneumococcal Conjugate 13-valent ([id number]) 2018    Pneumococcal Polysaccharide ([id number]) 2015, 2021    Tdap (Boostrix, Adacel) 2012    Zoster Recombinant (Shingrix) 2013         REVIEW OF SYSTEMS:  CONSTITUTIONAL:  negative for  fevers, chills, sweats, fatigue, malaise, anorexia and weight loss  EYES:  negative for  double vision, blurred vision, dry eyes, eye discharge, visual disturbance, irritation, redness and icterus  HEENT:  Negative for postnasal dripping and nasal obstruction and positive for decrease hearing and tinnitus sometime, negative ear drainage, nasal congestion, epistaxis, sore mouth, sore throat and hoarseness  RESPIRATORY: Positive for dyspnea on exertion, negative for cough, wheezing, hemoptysis, chest pain, pleuritic pain and cyanosis  CARDIOVASCULAR: Positive for dyspnea on exertion, negative for  chest pain, palpitations, orthopnea, PND, exertional chest pressure/discomfort, fatigue, early saiety, edema, syncope  GASTROINTESTINAL:  Negative for reflux, nausea, vomiting, change in bowel habits, diarrhea, constipation, abdominal pain, abdominal mass, abdominal distention, dysphagia, regurgitation, odynophagia, hematemesis and hemtochezia  GENITOURINARY: Positive for frequency, nocturia, negative for urinary incontinence, hesitancy, decreased stream and hematuria  HEMATOLOGIC/LYMPHATIC:  negative for easy bruising, bleeding, lymphadenopathy and petechiae  ALLERGIC/IMMUNOLOGIC:  negative for recurrent infections, urticaria, hay fever, angioedema, anaphylaxis and drug reactions  ENDOCRINE:  negative for heat intolerance, cold intolerance, tremor, change in bowel habits and hair loss  MUSCULOSKELETAL:  negative for  myalgias, arthralgias, joint swelling, stiff joints and decreased range of motion  NEUROLOGICAL:  negative for headaches, dizziness, seizures, memory problems, speech problems, visual disturbance, coordination problems, gait problems, tremor, dysphagia, weakness, numbness, syncope and tingling  BEHAVIOR/PSYCH:  negative          Physical Exam:    Vitals: BP (!) 151/84 (Site: Right Upper Arm, Position: Sitting, Cuff Size: Medium Adult)   Pulse 76   Temp 97.1 °F (36.2 °C)   Ht 5' 11\" (1.803 m)   Wt 275 lb (124.7 kg)   SpO2 97%   BMI 38.35 kg/m²   Last 3 weights: Wt Readings from Last 3 Encounters:   10/11/22 275 lb (124.7 kg)   09/22/22 277 lb (125.6 kg)   09/15/22 277 lb (125.6 kg)     Body mass index is 38.35 kg/m².     Physical Examination:   General appearance - alert, well appearing, and in no distress, overweight and acyanotic, in no respiratory distress  Mental status - alert, oriented to person, place, and time  Eyes - pupils equal and reactive, extraocular eye movements intact, sclera anicteric  Ears - right ear normal, left ear normal  Nose - bilateral slightly edematous mucosa and no turbinate hypertrophy and nasal obstruction, no erythema, discharge or polyps, large tongue, small oropharynx. Mouth - mucous membranes moist, pharynx normal without lesions. Mallampati 2  Neck - supple, no significant adenopathy, very short and thick neck  Chest -no tachypnea or retraction or cyanosis, bilateral symmetrical chest movement, air entry is bilaterally present slightly distant breath sound no expiratory wheezing rhonchi crackles.    Heart - normal rate, regular rhythm, normal S1, S2, no murmurs, rubs, clicks or gallops  Abdomen - soft, nontender, nondistended, no masses or organomegaly  Neurological - alert, oriented, normal speech, no focal findings or movement disorder noted  Extremities - peripheral pulses normal, no pedal edema, no clubbing or cyanosis  Skin - normal coloration and turgor, no rashes, no suspicious skin lesions noted       LABS:    CBC:   WBC   Date Value Ref Range Status   06/08/2022 8.2 3.5 - 11.3 k/uL Final   05/29/2022 9.6 3.5 - 11.0 k/uL Final   03/24/2022 8.0 3.5 - 11.0 k/uL Final     Hemoglobin   Date Value Ref Range Status   06/08/2022 14.4 13.0 - 17.0 g/dL Final   05/29/2022 14.6 13.5 - 17.5 g/dL Final   03/24/2022 15.1 13.5 - 17.5 g/dL Final     Platelet Count   Date Value Ref Range Status   12/05/2011 302 140 - 450 k/uL Final     Platelets   Date Value Ref Range Status   06/08/2022 314 138 - 453 k/uL Final   05/29/2022 284 150 - 450 k/uL Final   03/24/2022 294 150 - 450 k/uL Final     BMP:   Sodium   Date Value Ref Range Status   06/20/2022 135 135 - 144 mmol/L Final   05/29/2022 134 (L) 135 - 144 mmol/L Final   05/04/2022 140 135 - 144 mmol/L Final     Potassium   Date Value Ref Range Status   06/20/2022 4.3 3.7 - 5.3 mmol/L Final   05/29/2022 4.1 3.7 - 5.3 mmol/L Final   05/04/2022 4.9 3.7 - 5.3 mmol/L Final     Chloride   Date Value Ref Range Status   06/20/2022 101 98 - 107 mmol/L Final   05/29/2022 98 98 - 107 mmol/L Final   05/04/2022 100 98 - 107 mmol/L Final     CO2   Date Value Ref Range Status   06/20/2022 24 20 - 31 mmol/L Final   05/29/2022 24 20 - 31 mmol/L Final   05/04/2022 24 20 - 31 mmol/L Final     BUN   Date Value Ref Range Status   06/20/2022 15 8 - 23 mg/dL Final   05/29/2022 23 8 - 23 mg/dL Final   05/04/2022 13 8 - 23 mg/dL Final     Creatinine   Date Value Ref Range Status   06/20/2022 0.96 0.70 - 1.20 mg/dL Final   05/29/2022 1.36 (H) 0.70 - 1.20 mg/dL Final   05/04/2022 0.89 0.70 - 1.20 mg/dL Final     Glucose   Date Value Ref Range Status   06/20/2022 97 70 - 99 mg/dL Final   05/29/2022 96 70 - 99 mg/dL Final   05/04/2022 95 70 - 99 mg/dL Final   01/23/2012 93 74 - 106 mg/dL Final   12/05/2011 101 74 - 106 mg/dL Final     Hepatic:     Amylase: No results found for: AMYLASE  Lipase: No results found for: LIPASE  CARDIAC ENZYMES: No results found for: CKTOTAL, CKMB, CKMBINDEX, TROPONINI  BNP: No results found for: BNP  Lipids:       INR: No results found for: INR  Thyroid: No results found for: T4, TSH  Urinalysis:     Cultures:-  -----------------------------------------------------------------    Immunization History   Administered Date(s) Administered    COVID-19, PFIZER GRAY top, DO NOT Dilute, (age 15 y+), IM, 30 mcg/0.3 mL 05/09/2022    COVID-19, PFIZER PURPLE top, DILUTE for use, (age 15 y+), 30mcg/0.3mL 02/22/2021, 03/15/2021, 11/11/2021    Influenza Vaccine, unspecified formulation 11/07/2016, 10/30/2018    Influenza Virus Vaccine 10/30/2013, 10/15/2014, 10/16/2015, 10/25/2017, 10/30/2018    Influenza, FLUAD, (age 72 y+), Adjuvanted, 0.5mL 10/30/2020, 11/05/2021    Influenza, High Dose (Fluzone 65 yrs and older) 11/05/2018    Influenza, Triv, inactivated, subunit, adjuvanted, IM (Fluad 65 yrs and older) 10/17/2019    Pneumococcal Conjugate 13-valent ([id number]) 08/14/2018    Pneumococcal Polysaccharide ([id number]) 12/08/2015, 04/30/2021    Tdap (Boostrix, Adacel) 06/06/2012    Zoster Recombinant (Shingrix) 06/25/2013     ABGs: No results found for: PHART, PO2ART, [id number]    Pulmonary Functions Testing Results:    1/3/2017: FEV1 2.07 52%, FVC 3.47 70%, FTY4KVI 74%, TLC 7.4 102%, DLCO 25.25  94%    CXR 9/22/2017  The cardiomediastinal silhouette is unremarkable. Aortic vascular   calcification. The lungs are clear. No infiltrate, pleural fluid or   failure. Healed left-sided rib fractures. No acute osseous findings         CT Scans     CT scan chest 04/20/2022. Mediastinum:  Within the limitations of the unenhanced exam, no enlarged   thoracic lymph node by CT criteria. No pericardial effusion. Lungs/Pleura:  Pleural thickening and perifissural node redemonstrated. No   suspicious pulmonary nodule. No discrete lung lesion, infiltrate, pleural   effusion or pneumothorax. 09/30/2020.  1. Mild emphysema. Stable 6 mm right minor fissure and left major fissure    pulmonary nodules. Mild biapical pleuroparenchymal scarring. 2. Coronary artery disease. 3. Probable pulmonary arterial hypertension. 4. Atherosclerotic calcification of the aorta. 9/25/19  1. Stable multiple pulmonary nodules as detailed above measuring up to 6 mm,   unchanged from 09/24/2018. Mild emphysema. Biapical pleuroparenchymal   scarring. No acute focal airspace consolidation. 2. Mild cardiomegaly. Coronary artery disease. Atherosclerotic   calcification of the aorta.       09/24/18  1. Stable appearance of benign-appearing pleural-based lung nodules. Given   their stability and association with the pleura, findings likely reflect   benign etiologies such as intrapulmonary lymph nodes. No further follow-up   is suggested. 2. No new or suspicious lung nodules and no lymphadenopathy. 03/26/018  Nonspecific 7-8 mm pleural-based nodular density posterior right upper   lobe/apex on series 2, image 66.  5 mm nodule in the superior right middle   lobe along the fissure as well as 5 mm nodule in the superior left lower lobe   along the fissure both of which likely represent benign intrapulmonary lymph   nodes.          EKG:   ECHO:   Stress Test: Assessment and Plan       ICD-10-CM    1. Chronic obstructive pulmonary disease, unspecified COPD type ([facility]  J44.9       2. DEIRDRE (obstructive sleep apnea)  G47.33       3. Obesity (BMI 35.0-39.9 without comorbidity)  E66.9       4. Lung nodule  R91.1       5. History of smoking at least 1 pack per day for at least 30 years  Z87.891           Assessment:    Low-dose CT of the chest was done on 04/22/2022 Showed stable right middle lobe nodule and left fissure nodule pleural-based upper lung nodule with pleural thickening there is stable without much change as compared to CT scan of the chest on 04/05/2021. Both are stable as compared to CT scan of the chest from April 2018 to sep 2018 and sep 2019. He had history of sleep apnea on sleep study and I had discussed with him in the past about titration study and he did not feel like that he need CPAP. I have discussed with him again today and he is having frequent awakening sleep disturbance occasional dozing off during the daytime and unrefreshed sleep he wanted to proceed with titration study  His symptoms from COPD are stable using Spiriva does not use albuterol. Plan and recommendations      Continue Spiriva Respimat 2 puff once daily   Albuterol to be used as needed  Vaccinations recommended  and had Pneumonia vaccine 3.5 years ago  He is up-to-date on pneumonia vaccine  Annual Flu vaccine recommended. He had covid vaccination and booster    CPAP titration study requested.   Advised patient to schedule it when he can  He will need to be started on CPAP/auto CPAP after titration study is done  I have discussed with him about weight gain as his sleep apnea may get worse with weight gain  Wt loss is recommended and discussed  Increase activity and exercise discussed with the patient   Follow good sleep hygeine instructions  Given sleep hygeine instructions    Questions answered pertaining to diagnosis and management explained importance of compliance with therapy     RTC 6 months. It was my pleasure to evaluate Sander Hein today. Please call with questions. Marjorie Powell MD, MD             10/11/2022, 11:53 AM      Please note that this chart was generated using voice recognition Dragon dictation software. Although every effort was made to ensure the accuracy of this automated transcription, some errors in transcription may have occurred. Low Dose CT (LDCT) Lung Screening criteria met:     Age 50-77(Medicare) or 50-80 ([facility])   Pack year smoking >20   Still smoking or less than 15 year since quit   No sign or symptoms of lung cancer   > 11 months since last LDCT     Risks and benefits of lung cancer screening with LDCT scans discussed:    Significance of positive screen - False-positive LDCT results often occur. 95% of all positive results do not lead to a diagnosis of cancer. Usually further imaging can resolve most false-positive results; however, some patients may require invasive procedures. Over diagnosis risk - 10% to 12% of screen-detected lung cancer cases are over diagnosed--that is, the cancer would not have been detected in the patient's lifetime without the screening. Need for follow up screens annually to continue lung cancer screening effectiveness     Risks associated with radiation from annual LDCT- Radiation exposure is about the same as for a mammogram, which is about 1/3 of the annual background radiation exposure from everyday life. Starting screening at age 54 is not likely to increase cancer risk from radiation exposure. Patients with comorbidities resulting in life expectancy of < 10 years, or that would preclude treatment of an abnormality identified on CT, should not be screened due to lack of benefit.     To obtain maximal benefit from this screening, smoking cessation and long-term abstinence from smoking is critical

## 2022-10-25 RX ORDER — SODIUM CHLORIDE, SODIUM LACTATE, POTASSIUM CHLORIDE, CALCIUM CHLORIDE 600; 310; 30; 20 MG/100ML; MG/100ML; MG/100ML; MG/100ML
1000 INJECTION, SOLUTION INTRAVENOUS CONTINUOUS
Status: CANCELLED | OUTPATIENT
Start: 2022-10-25

## 2022-10-25 NOTE — DISCHARGE INSTRUCTIONS
Pre-operative Instructions    Please arrive at the surgery center by 5:45 AM on 11/4/2022  (or as directed by your surgeon's office). See Directons to Surgery Center below. FASTING    NOTHING TO EAT OR DRINK AFTER MIDNIGHT the night prior to surgery (This includes gum, candy, mints, chewing tobacco, etc). (Follow bowel prep instructions if instructed by your surgeon.)                MEDICATIONS    What to STOP: ANY BLOOD THINNING MEDICATION(S) as directed by your surgeon or prescribing physician. FAILURE TO STOP CERTAIN MEDICATIONS MAY INTERFERE WITH YOUR SCHEDULED SURGERY. According to the medication list you provided today, PLEASE STOP: aspirin, as directed by your prescribing physician     2. What to CONTINUE leading up to your surgery:   Please take all your other daily medications except the medications listed above that you were instructed to hold. 3. What to [place] with SMALL SIP OF WATER: amlodipine (Norvasc), prevacid                       IF APPLICABLE:  -If you have been given a blood band, you must bring it with you the day of surgery, unclasped.  -Use routine inhalers and bring inhalers the day of surgery.   -Bring C-Pap/Bi-pap with you morning of surgery if planning on staying in the hospital overnight.  -Do not take diabetic medications on the day of surgery. OTHER IMPORTANT REMINDERS    1) Please REMEMBER to get Covid-19 Screening if scheduled    2) You may be required to provide a urine sample upon your arrival to the pre-op area, so please take this into consideration. 3) If  NOT planning on staying in the hospital overnight : A. You will need an adult family member /friend to drive you home after your procedure.  Taxi cabs or any form of public transportation ALONE is not acceptable.   -Your  must be 25years of age or older and able to sign off on your discharge instructions.     -It is preferable that the friend or family member stay at the hospital throughout your procedure. Karl Decker must remain with you once you have arrived home for the first 24 hours after your surgery if you receive anesthesia or medication. If you do not have someone to stay with you, your procedure may be cancelled. 4) Do not wear any jewelry or body piercings day of surgery. 5) In case of illness - If you have cold or flu like symptoms (high fever, runny nose, sore throat, cough, etc.) rash, nausea, vomiting, loose stools, and/or recent contact with someone who has a contagious disease (Covid-19, chicken pox, measles, etc.) PLEASE notify your surgeon as soon as possible. 10/25/22  11:27 AM      ___________________  _______________________  Signature (Provider)              Signature (Patient)     Day of Surgery/Procedure    As a patient at [facility] you can expect quality medical and nursing care that is centered on your individual needs. Our goal is to make your surgical experience as comfortable as possible  . Directions to the [address] [place] [address] is located at [address] [place], [address] Please pull into the Emergency/Surgery Center parking lot or there is additional parking across the street. You will enter the facility under through the glass doors and proceed to registration check-in which is right inside the door. Thereafter you will be directed to the [address] [place] [address]. Patient Instructions    ·Please shower the night before and the morning of surgery with an antibacterial soap. Please use the cleaning solution (bottle) given to you the night before your surgery after your shower. Unless otherwise told by your physician, please do not shave legs or any part of your body below your neck the night before or day of your surgery. You may shave your face or neck. ·Please wear loose, comfortable clothing.   If you are potentially going to have a cast or brace bring clothing that will fit over them. ·Bring a list of all medications you take, along with the dose of the medications and how often you take it. If more convenient bring the pharmacy bottles in a zip lock bag. ·Brush your teeth but do not swallow water. ·Bring your eyeglasses and case with you. No contacts are to be worn the day of surgery. You also may bring your hearing aids. ·Do not bring any valuables, such as jewelry, cash or credit cards. If you are staying overnight with us, please bring a SMALL bag of personal items. We cannot accommodate large items, like suitcases. ·If your child is having surgery please make arrangements for any other children to be cared for at home on the day of surgery. Other children are not permitted in recovery room and we want you to be able to spend time with the patient. If other arrangements are not available then we suggest that you have a second adult to stay in the waiting room. ·If you are having any type of anesthesia you are to have nothing to eat or drink after midnight the night before your surgery. This includes gum, mints, water or smoking or chewing tobacco.  The only exception to this is a small sip of water to take with any morning dose of heart, blood pressure, or seizure medications. ·Bring your inhaler if you are currently using one. ·Bring your blood band if one has been given to you. Please do not close the clasp. ·If you are on C-PAP or Bi-PAP at home and plan on staying in the hospital overnight for your surgery please bring the machine with you. ·Do not wear any jewelry or body piercings day of surgery. Also, NO lotion, perfume or deodorant to be used the day of surgery. If you have any other questions regarding your procedure/surgery please call  your surgeon's office.      If you have a last minute question(s) the DAY OF your surgery, you may call [phone]

## 2022-10-28 ENCOUNTER — HOSPITAL ENCOUNTER (OUTPATIENT)
Dept: PREADMISSION TESTING | Age: 67
Discharge: HOME | End: 2022-11-01

## 2022-10-28 VITALS
SYSTOLIC BLOOD PRESSURE: 148 MMHG | BODY MASS INDEX: 38.36 KG/M2 | OXYGEN SATURATION: 99 % | RESPIRATION RATE: 18 BRPM | TEMPERATURE: 97.5 F | WEIGHT: 274 LBS | DIASTOLIC BLOOD PRESSURE: 80 MMHG | HEIGHT: 71 IN | HEART RATE: 72 BPM

## 2022-10-28 NOTE — H&P
History and Physical    Pt Name: Christi Yap  MRN: [id number]  YOB: 1955  Date of evaluation: 10/28/2022  Primary Care Physician: VIKY Haas CNP    SUBJECTIVE:   History of Chief Complaint:    Christi Yap is a 69 y.o. male who presents for PAT appointment. Patient complains of hematuria noted in June of this year. Patient states since this time, he was discovered to have a bladder tumor, with TURBT, ureteral stent placement 9/2022. Patient states he no longer has hematuria but does report frequency and some pain at the site of the ureteral stent. Patient has been scheduled for CYSTOSCOPY TRANSURETHRAL RESECTION BLADDER TUMOR, GYRUS, RIGHT STENT REMOVAL  Allergies  is allergic to compazine spansule [prochlorperazine], metoclopramide, and seasonal.  Medications  Prior to Admission medications    Medication Sig Start Date End Date Taking? Authorizing Provider   simvastatin (ZOCOR) 20 MG tablet Take 1 tablet by mouth nightly 10/13/22 1/11/23  VIKY Haas CNP   allopurinol (ZYLOPRIM) 300 MG tablet Take 1 tablet by mouth daily 10/11/22   VIKY Haas CNP   aspirin 81 MG tablet Take 1 tablet by mouth daily Hold for 7 days after surgery  Patient taking differently: Take 81 mg by mouth daily Pt. Stopped 10/28/22 9/13/22   Genia Pavon PA-C   Hyoscyamine Sulfate SL (LEVSIN/SL) 0.125 MG SUBL Place 1 tablet under the tongue every 8 hours as needed (bladder spasm)  Patient not taking: Reported on 10/28/2022 9/13/22 9/15/22  Sheron Penaloza MD   ALPJOSEPHZobrenda Chapin) 0.25 MG tablet Take 0.25 mg by mouth nightly as needed for Sleep.     Historical Provider, MD   fluticasone Skinny Casey) 50 MCG/ACT nasal spray 2 sprays by Nasal route daily 6/21/22 10/28/22  VIKY Haas CNP   hydroCHLOROthiazide (HYDRODIURIL) 25 MG tablet Take 1 tablet by mouth every morning  Patient not taking: No sig reported 5/6/22   VIKY Haas CNP   SPIRIVA RESPIMAT 2.5 MCG/ACT AERS inhaler USE 2 INHALATIONS DAILY 4/15/22   [address] MINA MD   amLODIPine (NORVASC) 10 MG tablet Take 10 mg by mouth at bedtime     Historical Provider, MD   TART CHERRY PO Take 1 capsule by mouth daily    Historical Provider, MD   lansoprazole (PREVACID) 30 MG delayed release capsule TAKE 1 CAPSULE DAILY 1/20/22   VIKY Trejo CNP   vitamin D3 (CHOLECALCIFEROL) 25 MCG (1000 UT) TABS tablet Take 1 tablet by mouth daily 3/19/21   VIKY Trejo CNP     Past Medical History    has a past medical history of Allergic rhinitis, Anxiety, Arthritis, Gilliam esophagus, Basal cell carcinoma of left upper arm, Bladder mass, Bleeding ulcer, Chest tightness, Chronic pain of right knee, Colon polyps, COPD (chronic obstructive pulmonary disease) ([facility] COVID-19, Duodenal hemorrhage, Environmental allergies, GERD (gastroesophageal reflux disease), Gout, H. pylori infection, History of blood transfusion, Hyperlipidemia, Hyperplastic colon polyp, Hypertension, Insomnia, Lung nodule, Mild sleep apnea, Palpitations, Perforated stomach ([facility] Pure hypercholesterolemia, Tubular adenoma of colon, Under care of team, Under care of team, Under care of team, Under care of team, Under care of team, Wears glasses, Wears glasses, Wears partial dentures, and Wellness examination. Past Surgical History   has a past surgical history that includes Umbilical hernia repair (07/2011); Stomach surgery (1985); Appendectomy; Tonsillectomy; Colonoscopy (01/06/2012); Nasal sinus surgery (10/05/2018); Upper gastrointestinal endoscopy (N/A, 04/29/2019); Colonoscopy (N/A, 04/29/2019); Skin cancer excision; Knee arthroscopy (Right, 04/07/2022); Cardiac catheterization (08/17/2022); TURP (Right, 09/13/2022); and Cystoscopy (N/A, 09/13/2022). Social History   reports that he quit smoking about 7 years ago. His smoking use included cigarettes. He started smoking about 52 years ago. He has a 30.00 pack-year smoking history.  He has never been exposed to tobacco smoke. He has never used smokeless tobacco.    reports current alcohol use of about 6.0 standard drinks per week. reports no history of drug use. Marital Status   Children 1  Occupation contractor  Family History  Family Status   Relation Name Status    Mother      Father      Sister  Alive    MGM      MGF      PGM      PGF       family history includes Cancer in his mother and sister; Emphysema in his paternal grandfather; Heart Attack in his paternal grandfather; Thyroid Disease in his sister. Review of Systems:  CONSTITUTIONAL:   negative for fevers, chills, fatigue and malaise    EYES:   negative for double vision, blurred vision and photophobia    HEENT:   negative for tinnitus, epistaxis and sore throat     RESPIRATORY:   negative for cough, shortness of breath, wheezing     CARDIOVASCULAR:   negative for chest pain, palpitations, syncope, edema     GASTROINTESTINAL:   negative for nausea, vomiting     GENITOURINARY:   negative for incontinence urinary frequency, pain at site of ureteral stent   MUSCULOSKELETAL:   negative for neck or back pain     NEUROLOGICAL:   Negative for weakness and tingling  negative for headaches and dizziness     PSYCHIATRIC:   negative for anxiety       OBJECTIVE:   VITALS:  height is 5' 11\" (1.803 m) and weight is 274 lb (124.3 kg). His temporal temperature is 97.5 °F (36.4 °C). His blood pressure is 148/80 (abnormal) and his pulse is 72. His respiration is 18 and oxygen saturation is 99%. CONSTITUTIONAL:alert & oriented x 3, no acute distress. Calm and pleasant. SKIN:  Warm and dry, no rashes to exposed areas of skin. HEAD:  Normocephalic, atraumatic. EYES: PERRL. EOMs intact. Wearing glasses. EARS:  Intact and equal bilaterally. Hearing grossly WNL. NOSE:  Nares patent. No rhinorrhea   MOUTH/THROAT:  Mucous membranes pink and moist, teeth appear to be intact, wearing partial lower dentures. NECK:supple, good ROM. LUNGS: Respirations even and non-labored. Clear to auscultation bilaterally, no wheezes, rales, or rhonchi. CARDIOVASCULAR: Regular rate and rhythm, no murmurs. ABDOMEN: soft, non-tender, rotund, bowel sounds active x 4   EXTREMITIES: No edema to bilateral lower extremities. Wearing bilateral compression stockings. NEUROLOGIC: CN II-XII are grossly intact. Gait is smooth. Testing:   EKG: on file from 6/2022  Labs: on file from 10/11/2022  IMPRESSIONS:   Bladder tumor, hematuria.    PLANS:   CYSTOSCOPY TRANSURETHRAL RESECTION BLADDER TUMOR, GYRUS, RIGHT STENT REMOVAL    VIKY Cowart CNP  Electronically signed 10/28/2022 at 12:14 PM

## 2022-10-28 NOTE — PROGRESS NOTES
Anesthesia Focused Assessment    Hx of anesthesia complications:  no  Family hx of anesthesia complications:  no      Prior + Covid-19 test? yes  Date: 9/23/2022  Symptoms: pharyngitis x 1 day  Complications: none  Hospitalization required? no      STOP-BANG Sleep Apnea Questionnaire    SNORE loudly (heard through closed doors)? No  TIRED, fatigued, sleepy during daytime? No  OBSERVED stopping breathing during sleep? Yes  High blood PRESSURE or being treated? Yes    BMI over 35? Yes  AGE over 48? Yes  NECK circumference over 16\"? No  GENDER (male)? Yes             Total 5  High risk 5-8  Intermediate risk 3-4  Low risk 0-2    ----------------------------------------------------------------------------------------------------------------------  DEIRDRE                              Yes  If yes, machine? No    DM1                                            No  DM2                   No    Coronary Artery Disease      No  HTN         Yes  Defib/AICD/Pacemaker               No             Renal Failure                   No  If yes, on dialysis           Active smoker? No, quit 2014  Drinks alcohol? Yes, few daily  Illicit drugs? No  Dentition?         Wears partial lower dentures      Past Medical History:   Diagnosis Date    Allergic rhinitis     Anxiety     Arthritis     Gilliam esophagus     RESOLVED    Basal cell carcinoma of left upper arm 02/09/2018    bladder    Bladder mass     Bleeding ulcer     Also states hx of \"perforated stomach\" in 1985    Chest tightness     Chronic pain of right knee     Colon polyps     COPD (chronic obstructive pulmonary disease) ([facility]     COVID-19 09/23/2022    sore throat x 1 day; received Paxlovid    Duodenal hemorrhage     Environmental allergies     GERD (gastroesophageal reflux disease)     Gout     RT HAND    H. pylori infection     History of blood transfusion 1985    DUODENAL BLEED/ no reaction    Hyperlipidemia Hyperplastic colon polyp     Hypertension     Insomnia     Lung nodule     has yearly follow up CT scans    Mild sleep apnea     no machine    Palpitations     Perforated stomach ([address] 1985    Pure hypercholesterolemia     Tubular adenoma of colon 01/2012     Dr. Artem Duarte colonoscopy    Under care of team 06/21/2022    UROLOGY - DR. Aguilar 192 - LAST VISIT 6/2022    Under care of team 06/21/2022    PULMONOLOGY- DR. Jeanine Berumen - LAST VISIT 10/2022    Under care of team 06/21/2022    ORTHO - DR. BROWER - LAST VISIT 4/2022    Under care of team 06/21/2022    GI - DR. DELGADILLO    Under care of team     CARDIOLOGY - DR. JUAREZ/ last seen 8-2022    Wears glasses     Wears glasses     Wears partial dentures     Bottom    Wellness examination 06/21/2022    PCP - Calos Blanc CNP - LAST VISIT - 5/2022         Patient was evaluated in PAT & anesthesia guidelines were applied. NPO guidelines, medication instructions and scheduled arrival time were reviewed with patient. Anesthesia contacted:   no    Medical or cardiac clearance ordered: no, cardiology notes on file from 8/2022 due to false positive stress test 7/2022, cardiac cath performed 8/2022 with results of normal coronary arteries, normal LV function. Notes from cardiology state to follow up on an as needed basis. Patient denies any cardiopulmonary complaints.      VIKY Sifuentes - CNP   10/28/22  12:19 PM

## 2022-11-03 ENCOUNTER — TELEPHONE (OUTPATIENT)
Dept: UROLOGY | Age: 67
End: 2022-11-03

## 2022-11-03 NOTE — TELEPHONE ENCOUNTER
Received call back from patient. .. pt notified of surgery time being changed to 9am for a 7am arrival. Pt verbalized an understanding and all questions were answered

## 2022-11-03 NOTE — TELEPHONE ENCOUNTER
Received call from [address] [place] [address] that pts procedure time had been changed back to 10am for an 8am arrival.     Per Cary Jerez (wife) pt was unavailable to come to the phone at the moment. Cary Jerez notified of the time change. Per Cary Jerez pt was unavailable to come to the phone at the moment. She verbalized an understanding and all questions were answered.

## 2022-11-03 NOTE — TELEPHONE ENCOUNTER
Left message for patient regarding time change for tomorrow 11/4, instructed patient to call office back to discuss time change for surgery tomorrow.

## 2022-11-04 ENCOUNTER — ANESTHESIA (OUTPATIENT)
Dept: OPERATING ROOM | Age: 67
End: 2022-11-04
Payer: MEDICARE

## 2022-11-04 ENCOUNTER — ANESTHESIA EVENT (OUTPATIENT)
Dept: OPERATING ROOM | Age: 67
End: 2022-11-04
Payer: MEDICARE

## 2022-11-04 ENCOUNTER — HOSPITAL ENCOUNTER (OUTPATIENT)
Age: 67
Setting detail: OUTPATIENT SURGERY
Discharge: HOME | End: 2022-11-04
Attending: UROLOGY | Admitting: UROLOGY
Payer: MEDICARE

## 2022-11-04 VITALS
WEIGHT: 271.17 LBS | HEART RATE: 63 BPM | HEIGHT: 71 IN | OXYGEN SATURATION: 97 % | BODY MASS INDEX: 37.96 KG/M2 | TEMPERATURE: 97.7 F | DIASTOLIC BLOOD PRESSURE: 89 MMHG | SYSTOLIC BLOOD PRESSURE: 159 MMHG | RESPIRATION RATE: 19 BRPM

## 2022-11-04 DIAGNOSIS — G89.18 ACUTE POSTOPERATIVE PAIN: Primary | ICD-10-CM

## 2022-11-04 LAB — POC POTASSIUM: 4.9 MMOL/L (ref 3.5–4.5)

## 2022-11-04 PROCEDURE — 2500000003 HC RX 250 WO HCPCS: Performed by: NURSE ANESTHETIST, CERTIFIED REGISTERED

## 2022-11-04 PROCEDURE — 7100000040 HC SPAR PHASE II RECOVERY - FIRST 15 MIN: Performed by: UROLOGY

## 2022-11-04 PROCEDURE — 6360000002 HC RX W HCPCS: Performed by: NURSE ANESTHETIST, CERTIFIED REGISTERED

## 2022-11-04 PROCEDURE — 3700000000 HC ANESTHESIA ATTENDED CARE: Performed by: UROLOGY

## 2022-11-04 PROCEDURE — 2709999900 HC NON-CHARGEABLE SUPPLY: Performed by: UROLOGY

## 2022-11-04 PROCEDURE — 6360000002 HC RX W HCPCS: Performed by: STUDENT IN AN ORGANIZED HEALTH CARE EDUCATION/TRAINING PROGRAM

## 2022-11-04 PROCEDURE — 3600000014 HC SURGERY LEVEL 4 ADDTL 15MIN: Performed by: UROLOGY

## 2022-11-04 PROCEDURE — 7100000000 HC PACU RECOVERY - FIRST 15 MIN: Performed by: UROLOGY

## 2022-11-04 PROCEDURE — 84132 ASSAY OF SERUM POTASSIUM: CPT

## 2022-11-04 PROCEDURE — 7100000001 HC PACU RECOVERY - ADDTL 15 MIN: Performed by: UROLOGY

## 2022-11-04 PROCEDURE — 2580000003 HC RX 258: Performed by: UROLOGY

## 2022-11-04 PROCEDURE — 3600000004 HC SURGERY LEVEL 4 BASE: Performed by: UROLOGY

## 2022-11-04 PROCEDURE — 2720000010 HC SURG SUPPLY STERILE: Performed by: UROLOGY

## 2022-11-04 PROCEDURE — 7100000041 HC SPAR PHASE II RECOVERY - ADDTL 15 MIN: Performed by: UROLOGY

## 2022-11-04 PROCEDURE — 2580000003 HC RX 258: Performed by: ANESTHESIOLOGY

## 2022-11-04 PROCEDURE — 3700000001 HC ADD 15 MINUTES (ANESTHESIA): Performed by: UROLOGY

## 2022-11-04 RX ORDER — MAGNESIUM HYDROXIDE 1200 MG/15ML
LIQUID ORAL PRN
Status: DISCONTINUED | OUTPATIENT
Start: 2022-11-04 | End: 2022-11-04 | Stop reason: HOSPADM

## 2022-11-04 RX ORDER — DEXAMETHASONE SODIUM PHOSPHATE 10 MG/ML
INJECTION INTRAMUSCULAR; INTRAVENOUS PRN
Status: DISCONTINUED | OUTPATIENT
Start: 2022-11-04 | End: 2022-11-04 | Stop reason: SDUPTHER

## 2022-11-04 RX ORDER — FENTANYL CITRATE 50 UG/ML
INJECTION, SOLUTION INTRAMUSCULAR; INTRAVENOUS PRN
Status: DISCONTINUED | OUTPATIENT
Start: 2022-11-04 | End: 2022-11-04 | Stop reason: SDUPTHER

## 2022-11-04 RX ORDER — FENTANYL CITRATE 50 UG/ML
50 INJECTION, SOLUTION INTRAMUSCULAR; INTRAVENOUS EVERY 5 MIN PRN
Status: DISCONTINUED | OUTPATIENT
Start: 2022-11-04 | End: 2022-11-04 | Stop reason: HOSPADM

## 2022-11-04 RX ORDER — MAGNESIUM HYDROXIDE 1200 MG/15ML
LIQUID ORAL CONTINUOUS PRN
Status: DISCONTINUED | OUTPATIENT
Start: 2022-11-04 | End: 2022-11-04 | Stop reason: HOSPADM

## 2022-11-04 RX ORDER — ROCURONIUM BROMIDE 10 MG/ML
INJECTION, SOLUTION INTRAVENOUS PRN
Status: DISCONTINUED | OUTPATIENT
Start: 2022-11-04 | End: 2022-11-04 | Stop reason: SDUPTHER

## 2022-11-04 RX ORDER — SODIUM CHLORIDE 0.9 % (FLUSH) 0.9 %
5-40 SYRINGE (ML) INJECTION EVERY 12 HOURS SCHEDULED
Status: DISCONTINUED | OUTPATIENT
Start: 2022-11-04 | End: 2022-11-04 | Stop reason: HOSPADM

## 2022-11-04 RX ORDER — TRAMADOL HYDROCHLORIDE 50 MG/1
50 TABLET ORAL EVERY 6 HOURS PRN
Qty: 6 TABLET | Refills: 0 | Status: SHIPPED | OUTPATIENT
Start: 2022-11-04 | End: 2022-11-07

## 2022-11-04 RX ORDER — PROPOFOL 10 MG/ML
INJECTION, EMULSION INTRAVENOUS PRN
Status: DISCONTINUED | OUTPATIENT
Start: 2022-11-04 | End: 2022-11-04 | Stop reason: SDUPTHER

## 2022-11-04 RX ORDER — SODIUM CHLORIDE 0.9 % (FLUSH) 0.9 %
5-40 SYRINGE (ML) INJECTION PRN
Status: DISCONTINUED | OUTPATIENT
Start: 2022-11-04 | End: 2022-11-04 | Stop reason: HOSPADM

## 2022-11-04 RX ORDER — CEFADROXIL 500 MG/1
500 CAPSULE ORAL 2 TIMES DAILY
Qty: 6 CAPSULE | Refills: 0 | Status: SHIPPED | OUTPATIENT
Start: 2022-11-04 | End: 2022-11-07

## 2022-11-04 RX ORDER — LIDOCAINE HYDROCHLORIDE 10 MG/ML
INJECTION, SOLUTION EPIDURAL; INFILTRATION; INTRACAUDAL; PERINEURAL PRN
Status: DISCONTINUED | OUTPATIENT
Start: 2022-11-04 | End: 2022-11-04 | Stop reason: SDUPTHER

## 2022-11-04 RX ORDER — SODIUM CHLORIDE, SODIUM LACTATE, POTASSIUM CHLORIDE, CALCIUM CHLORIDE 600; 310; 30; 20 MG/100ML; MG/100ML; MG/100ML; MG/100ML
1000 INJECTION, SOLUTION INTRAVENOUS CONTINUOUS
Status: DISCONTINUED | OUTPATIENT
Start: 2022-11-04 | End: 2022-11-04 | Stop reason: HOSPADM

## 2022-11-04 RX ORDER — ONDANSETRON 2 MG/ML
INJECTION INTRAMUSCULAR; INTRAVENOUS PRN
Status: DISCONTINUED | OUTPATIENT
Start: 2022-11-04 | End: 2022-11-04 | Stop reason: SDUPTHER

## 2022-11-04 RX ORDER — MIDAZOLAM HYDROCHLORIDE 1 MG/ML
INJECTION INTRAMUSCULAR; INTRAVENOUS PRN
Status: DISCONTINUED | OUTPATIENT
Start: 2022-11-04 | End: 2022-11-04 | Stop reason: SDUPTHER

## 2022-11-04 RX ORDER — FENTANYL CITRATE 50 UG/ML
25 INJECTION, SOLUTION INTRAMUSCULAR; INTRAVENOUS EVERY 5 MIN PRN
Status: DISCONTINUED | OUTPATIENT
Start: 2022-11-04 | End: 2022-11-04 | Stop reason: HOSPADM

## 2022-11-04 RX ORDER — SODIUM CHLORIDE 9 MG/ML
INJECTION, SOLUTION INTRAVENOUS PRN
Status: DISCONTINUED | OUTPATIENT
Start: 2022-11-04 | End: 2022-11-04 | Stop reason: HOSPADM

## 2022-11-04 RX ADMIN — ONDANSETRON 4 MG: 2 INJECTION INTRAMUSCULAR; INTRAVENOUS at 10:14

## 2022-11-04 RX ADMIN — SUGAMMADEX 200 MG: 100 INJECTION, SOLUTION INTRAVENOUS at 10:42

## 2022-11-04 RX ADMIN — SODIUM CHLORIDE, POTASSIUM CHLORIDE, SODIUM LACTATE AND CALCIUM CHLORIDE 1000 ML: 600; 310; 30; 20 INJECTION, SOLUTION INTRAVENOUS at 09:29

## 2022-11-04 RX ADMIN — DEXAMETHASONE SODIUM PHOSPHATE 10 MG: 10 INJECTION INTRAMUSCULAR; INTRAVENOUS at 10:14

## 2022-11-04 RX ADMIN — LIDOCAINE HYDROCHLORIDE 50 MG: 10 INJECTION, SOLUTION EPIDURAL; INFILTRATION; INTRACAUDAL; PERINEURAL at 10:14

## 2022-11-04 RX ADMIN — Medication 3000 MG: at 10:25

## 2022-11-04 RX ADMIN — PROPOFOL 150 MG: 10 INJECTION, EMULSION INTRAVENOUS at 10:14

## 2022-11-04 RX ADMIN — FENTANYL CITRATE 50 MCG: 50 INJECTION, SOLUTION INTRAMUSCULAR; INTRAVENOUS at 10:38

## 2022-11-04 RX ADMIN — MIDAZOLAM 2 MG: 1 INJECTION INTRAMUSCULAR; INTRAVENOUS at 10:09

## 2022-11-04 RX ADMIN — FENTANYL CITRATE 50 MCG: 50 INJECTION, SOLUTION INTRAMUSCULAR; INTRAVENOUS at 10:14

## 2022-11-04 RX ADMIN — ROCURONIUM BROMIDE 50 MG: 10 INJECTION, SOLUTION INTRAVENOUS at 10:14

## 2022-11-04 ASSESSMENT — PAIN - FUNCTIONAL ASSESSMENT: PAIN_FUNCTIONAL_ASSESSMENT: 0-10

## 2022-11-04 NOTE — ANESTHESIA PRE PROCEDURE
Department of Anesthesiology  Preprocedure Note       Name:  Dae Meyer   Age:  69 y.o.  :  1955                                          MRN:  [id number]         Date:  2022      Surgeon: Tonny Epps):  Ryan Riojas MD    Procedure: Procedure(s):  CYSTOSCOPY TRANSURETHRAL RESECTION BLADDER TUMOR, GYRUS, RIGHT STENT REMOVAL    Medications prior to admission:   Prior to Admission medications    Medication Sig Start Date End Date Taking? Authorizing Provider   simvastatin (ZOCOR) 20 MG tablet Take 1 tablet by mouth nightly 10/13/22 1/11/23  VIKY Washington CNP   allopurinol (ZYLOPRIM) 300 MG tablet Take 1 tablet by mouth daily 10/11/22   VIKY Washington CNP   aspirin 81 MG tablet Take 1 tablet by mouth daily Hold for 7 days after surgery  Patient taking differently: Take 81 mg by mouth daily Pt. Stopped 10/28/22 9/13/22   Genia Pavon PA-C   Hyoscyamine Sulfate SL (LEVSIN/SL) 0.125 MG SUBL Place 1 tablet under the tongue every 8 hours as needed (bladder spasm)  Patient not taking: Reported on 10/28/2022 9/13/22 9/15/22  Gilberto Roman MD   ALPRAZolam Taty Webber) 0.25 MG tablet Take 0.25 mg by mouth nightly as needed for Sleep. Historical Provider, MD   fluticasone [place] Payne) 50 MCG/ACT nasal spray 2 sprays by Nasal route daily 22  VIKY Washington CNP   SPIRIVA RESPIMAT 2.5 MCG/ACT AERS inhaler USE 2 INHALATIONS DAILY 4/15/22   Frederic Rodriguez MD   amLODIPine (NORVASC) 10 MG tablet Take 10 mg by mouth at bedtime     Historical Provider, MD   TART CHERRY PO Take 1 capsule by mouth daily    Historical Provider, MD   lansoprazole (PREVACID) 30 MG delayed release capsule TAKE 1 CAPSULE DAILY 22   VIKY Washington CNP   vitamin D3 (CHOLECALCIFEROL) 25 MCG (1000 UT) TABS tablet Take 1 tablet by mouth daily 3/19/21   VIKY Washington CNP       Current medications:    No current facility-administered medications for this visit.      No current outpatient medications on file.     Facility-Administered Medications Ordered in Other Visits   Medication Dose Route Frequency Provider Last Rate Last Admin    ceFAZolin (ANCEF) 3000 mg in dextrose 5 % 100 mL IVPB  3,000 mg IntraVENous Once Mihai Seay MD        lactated ringers infusion 1,000 mL  1,000 mL IntraVENous Continuous Elmer Hodgson MD           Allergies:     Allergies   Allergen Reactions    Compazine Spansule [Prochlorperazine]      MUSCULAR DYSTONIA    Metoclopramide      MUSCULAR DYSTONIA    Seasonal        Problem List:    Patient Active Problem List   Diagnosis Code    Allergic rhinitis J30.9    Anxiety F41.9    Hyperplastic colon polyp K63.5    Gilliam's esophagus without dysplasia K22.70    GERD (gastroesophageal reflux disease) K21.9    Bleeding ulcer K28.4    Environmental allergies     Tubular adenoma of colon D12.6    History of tobacco abuse Z87.891    Essential hypertension I10    Pure hypercholesterolemia E78.00    Chronic obstructive pulmonary disease (HCC) J44.9    Bilateral hearing loss H91.93    Tinnitus aurium H93.19    Acute pain of right shoulder M25.511    Basal cell carcinoma of left upper arm C44.619    Seasonal allergic rhinitis due to pollen J30.1    Cold sore B00.1    Ventral hernia without obstruction or gangrene K43.9    Pulmonary nodules R91.8    Mass of right thigh R22.41    Osteopenia of right lower leg M85.861    Chronic pain of right knee M25.561, G89.29    Hyperglycemia R73.9    Localized swelling of lower extremity M79.89    Class 2 drug-induced obesity with serious comorbidity and body mass index (BMI) of 38.0 to 38.9 in adult E66.1, Z68.38    Class 2 severe obesity due to excess calories with serious comorbidity and body mass index (BMI) of 38.0 to 38.9 in adult (HCC) E66.01, Z68.38    Right hand pain M79.641    Basal cell carcinoma (BCC) of left upper arm C44.619       Past Medical History:        Diagnosis Date    Allergic rhinitis     Anxiety     Arthritis     Gilliam esophagus     RESOLVED    Basal cell carcinoma of left upper arm 02/09/2018    bladder    Bladder mass     Bleeding ulcer     Also states hx of \"perforated stomach\" in 1985    Chest tightness     Chronic pain of right knee     Colon polyps     COPD (chronic obstructive [facility] disease) [facility] [address]     COVID-19 09/23/2022    sore throat x 1 day; received Paxlovid    Duodenal hemorrhage     Environmental allergies     GERD (gastroesophageal reflux disease)     Gout     RT HAND    H. pylori infection     History of blood transfusion 1985    DUODENAL BLEED/ no reaction    Hyperlipidemia     Hyperplastic colon polyp     Hypertension     Insomnia     Lung nodule     has yearly follow up CT scans    Mild sleep apnea     no machine    Palpitations     Perforated stomach ([facility] [address] 1985    Pure hypercholesterolemia     Tubular adenoma of colon 01/2012     Dr. Fidelia Wick colonoscopy    Under care of team 06/21/2022    UROLOGY - DR. SÁNCHEZ - LAST VISIT 6/2022    Under care of team 06/21/2022    PULMONOLOGY- DR. Melissa Morrison - LAST VISIT 10/2022    Under care of team 06/21/2022    ORTHO - DR. BROWER - LAST VISIT 4/2022    Under care of team 06/21/2022    GI - DR. DELGADILLO    Under care of team     CARDIOLOGY - DR. JUAREZ/ last seen 8-2022    Wears glasses     Wears partial dentures     Bottom    Wellness examination 06/21/2022    PCP - Polly Hollis CNP - LAST VISIT - 5/2022       Past Surgical History:        Procedure Laterality Date    APPENDECTOMY      CARDIAC CATHETERIZATION  08/17/2022    NEGATIVE    COLONOSCOPY  01/06/2012    COLONOSCOPY N/A 04/29/2019    COLONOSCOPY POLYPECTOMY SNARE/COLD BIOPSY performed by Marcus Lundy MD at [facility] 09/13/2022    CYSTOSCOPY, TUR BLADDER TUMOR (GYRUS), RIGHT URETERAL STENT PLACEMENT performed by Mushtaq Boswell MD at [address] [place] [address] ARTHROSCOPY Right 04/07/2022    KNEE ARTHROSCOPIC PARTIAL LATERAL AND PARTIAL MEDIAL MENISCECTOMY performed by Lora Mendiola MD at [address]  10/05/2018    SKIN CANCER EXCISION      STOMACH SURGERY  1985    Perforated Stomach repair    TONSILLECTOMY      TURP Right 2022    CYSTOSCOPY, TUR BLADDER TUMOR (GYRUS), RIGHT URETERAL STENT PLACEMENT    UMBILICAL HERNIA REPAIR  2011    UPPER GASTROINTESTINAL ENDOSCOPY N/A 2019    EGD BIOPSY performed by Marc [facility] at [facility] History:    Social History     Tobacco Use    Smoking status: Former     Packs/day: 1.00     Years: 30.00     Pack years: 30.00     Types: Cigarettes     Start date: 5     Quit date: 2014     Years since quittin.8     Passive exposure: Never    Smokeless tobacco: Never   Substance Use Topics    Alcohol use: Yes     Alcohol/week: 6.0 standard drinks     Types: 6 Cans of beer per week     Comment: 2-6 cans beer/day                                Counseling given: Not Answered      Vital Signs (Current): There were no vitals filed for this visit.                                            BP Readings from Last 3 Encounters:   10/28/22 (!) 148/80   10/11/22 (!) 151/84   22 130/78       NPO Status:                                                                                 BMI:   Wt Readings from Last 3 Encounters:   10/28/22 274 lb (124.3 kg)   10/11/22 275 lb (124.7 kg)   22 277 lb (125.6 kg)     There is no height or weight on file to calculate BMI.    CBC:   Lab Results   Component Value Date/Time    WBC 10.6 10/11/2022 10:04 AM    RBC 4.78 10/11/2022 10:04 AM    RBC 4.83 2011 09:33 AM    HGB 14.8 10/11/2022 10:04 AM    HCT 44.8 10/11/2022 10:04 AM    MCV 93.7 10/11/2022 10:04 AM    RDW 12.2 10/11/2022 10:04 AM     10/11/2022 10:04 AM     2011 09:33 AM       CMP:   Lab Results   Component Value Date/Time     10/11/2022 10:04 AM    K 5.1 10/11/2022 10:04 AM     10/11/2022 10:04 AM    CO2 26 10/11/2022 10:04 AM    BUN 15 10/11/2022 10:04 AM    CREATININE 1.02 10/11/2022 10:04 AM    GFRAA >60 06/20/2022 04:48 PM    LABGLOM >60 10/11/2022 10:04 AM    GLUCOSE 100 10/11/2022 10:04 AM    GLUCOSE 93 01/23/2012 08:57 AM    PROT 7.0 10/11/2022 10:04 AM    CALCIUM 9.3 10/11/2022 10:04 AM    BILITOT 0.8 10/11/2022 10:04 AM    ALKPHOS 82 10/11/2022 10:04 AM    AST 16 10/11/2022 10:04 AM    ALT 19 10/11/2022 10:04 AM       POC Tests: No results for input(s): POCGLU, POCNA, POCK, POCCL, POCBUN, POCHEMO, POCHCT in the last 72 hours. Coags: No results found for: PROTIME, INR, APTT    HCG (If Applicable): No results found for: PREGTESTUR, PREGSERUM, HCG, HCGQUANT     ABGs: No results found for: PHART, PO2ART, BQT2YRP, OLC0TQM, BEART, G2LNCSIX     Type & Screen (If Applicable):  No results found for: LABABO, LABRH    Drug/Infectious Status (If Applicable):  No results found for: HIV, HEPCAB    COVID-19 Screening (If Applicable): No results found for: COVID19        Anesthesia Evaluation  Patient summary reviewed and Nursing notes reviewed no history of anesthetic complications:   Airway: Mallampati: III  TM distance: >3 FB   Neck ROM: full  Mouth opening: > = 3 FB   Dental:    (+) partials      Pulmonary:normal exam    (+) COPD:  sleep apnea:                            ROS comment: 30 pack year smoker quit 2014   Cardiovascular:    (+) hypertension:, hyperlipidemia                  Neuro/Psych:               GI/Hepatic/Renal:   (+) GERD:,           Endo/Other:                      ROS comment: Bladder mass  6 drinks per week Abdominal:             Vascular: Other Findings:             Anesthesia Plan      general     ASA 3       Induction: intravenous. MIPS: Postoperative opioids intended and Prophylactic antiemetics administered. Anesthetic plan and risks discussed with patient. Plan discussed with CRNA.                     Ag Vaughn MD   11/4/2022

## 2022-11-04 NOTE — DISCHARGE INSTRUCTIONS
OK to dc home when recovered. Can restart ASA 81 mg in 48 hours if no blood in urine  Medications in chart  Pt will Follow up with Dr. Giorgio Cortes as scheduled      Call your doctor for the following:   Chills   Temperature greater than 101   Pain that is not tolerable despite taking pain medicine as ordered   There is increased swelling, redness or warmth at surgical site   There is increased drainage or bleeding from surgical site   Do not remove surgical dressing unless instructed to do so by your surgeon         No alcoholic beverages, no driving or operating machinery, no making important decisions for 24 hours. You may have a normal diet but should eat lightly day of surgery.   Drink plenty of fluids  Urinate within 8 hours after surgery, if unable to urinate call your doctor

## 2022-11-04 NOTE — ANESTHESIA POSTPROCEDURE EVALUATION
Department of Anesthesiology  Postprocedure Note    Patient: Eleno Velasco  MRN: [id number]  YOB: 1955  Date of evaluation: 11/4/2022      Procedure Summary     Date: 11/04/22 Room / Location: [address]    Anesthesia Start: 1009 Anesthesia Stop: 2517    Procedure: CYSTOSCOPY TRANSURETHRAL RESECTION BLADDER TUMOR, GYRUS, RIGHT STENT REMOVAL Diagnosis:       Bladder mass      (BLADDER MASS)    Surgeons: Ange Steve MD Responsible Provider: Niyah Marrufo MD    Anesthesia Type: general ASA Status: 3          Anesthesia Type: No value filed.     Ann Marie Phase I: Ann Marie Score: 10    Ann Marie Phase II: Ann Marie Score: 10      Anesthesia Post Evaluation    Patient location during evaluation: PACU  Patient participation: complete - patient participated  Level of consciousness: awake and alert  Pain score: 1  Airway patency: patent  Nausea & Vomiting: no nausea and no vomiting  Complications: no  Cardiovascular status: hemodynamically stable  Respiratory status: acceptable  Hydration status: euvolemic

## 2022-11-04 NOTE — OP NOTE
Operative Note      Patient: Valentina Gaona  YOB: 1955  MRN: [id number]    Date of Procedure: 11/4/2022    Pre-Op Diagnosis: BLADDER MASS    Post-Op Diagnosis: BLADDER LESIONS       Procedure(s):  Cystoscopy  Fulguration of bladder lesions  Removal of right ureteral stent    Surgeon(s):  Oksana Manzo MD    Assistant:   Sara Sam MD    Anesthesia: General    Estimated Blood Loss (mL): Minimal    Complications: None    Specimens:   * No specimens in log *    Implants:  * No implants in log *      Drains:   [REMOVED] Urinary Catheter 09/13/22 3 Way (Removed)       Findings:   Cystoscopy: prior resection site on the right lateral wall around the ureteral orifice, small bladder lesion on the left lateral wall    INDICATIONS FOR PROCEDURE:  The patient is a 69 y.o. male who presents with a bladder tumor. He is here today for resection of bladder tumor. The risks and benefits of the procedure as well as possible alternatives and complications were discussed and he consented. DETAILS OF THE PROCEDURE:  The patient was correctly identified in the preoperative holding area. he was brought back to the operating room and placed in the dorsal lithotomy position. EPC cuffs were on, in place, and fully functional. General endotracheal anesthesia was administered. He was given Ancef 2gm IV  for antibiotic prophylaxis. The patient was then prepped and draped in the usual sterile fashion. After appropriate time-out was performed with all parties agreeing, the visual obturator was inserted into the bladder. A thorough and complete cystoscopy was then performed which showed the ureteral orifices were patent in the orthotopic locations. It also demonstrated prior resection site near right ureteral orifice. There were also two small papillary lesions seen on the left lateral wall close to the left ureteral orifice. The resectoscope was then inserted and used to to fulgurate the left lateral wall lesions. Fulguration was also done to the area of prior resection. All bleeding areas were fulgurated and hemostasis was visualized. A stent grasper was used to grab the right ureteral stent and remove it. The bladder was then drained and the cystoscope was removed. A sylvester catheter was not inserted. The patient tolerated the procedure well and was sent to PACU for postoperative monitoring. DISPOSITION:  The patient was discharged home in stable condition.       Electronically signed by Lyndsey Serrato MD on 11/4/2022 at 10:51 AM

## 2022-11-04 NOTE — H&P
Oswaldo Jay, iMchael [facility]yon, Joel[facility]   Urology H&P      Patient:  Dacia Redman  MRN: [id number]  YOB: 1955    CHIEF COMPLAINT:  Bladder tumor    HISTORY OF PRESENT ILLNESS:   The patient is a 69 y.o. male who presents with bladder tumor. He was diagnosed with  bladder mass R lateral wall and base next to UO. The largest tumor was >5cm in size. The ureteral orifices were patent. He presents for re-resection, right stent removal.     Patient's old records, notes and chart reviewed and summarized above. Past Medical History:    Past Medical History:   Diagnosis Date    Allergic rhinitis     Anxiety     Arthritis     Gilliam esophagus     RESOLVED    Basal cell carcinoma of left upper arm 02/09/2018    bladder    Bladder mass     Bleeding ulcer     Also states hx of \"perforated stomach\" in 1985    Chest tightness     Chronic pain of right knee     Colon polyps     COPD (chronic obstructive pulmonary disease) ([facility] [address]     COVID-19 09/23/2022    sore throat x 1 day; received Paxlovid    Duodenal hemorrhage     Environmental allergies     GERD (gastroesophageal reflux disease)     Gout     RT HAND    H. pylori infection     History of blood transfusion 1985    DUODENAL BLEED/ no reaction    Hyperlipidemia     Hyperplastic colon polyp     Hypertension     Insomnia     Lung nodule     has yearly follow up CT scans    Mild sleep apnea     no machine    Palpitations     Perforated stomach ([address] 1985    Pure hypercholesterolemia     Tubular adenoma of colon 01/2012     Dr. Macy Guy colonoscopy    Under care of team 06/21/2022    UROLOGY - DR. Aguilar 192 - LAST VISIT 6/2022    Under care of team 06/21/2022    PULMONOLOGY- DR. Isidro Andrews - LAST VISIT 10/2022    Under care of team 06/21/2022    ORTHO - DR. BROWER - LAST VISIT 4/2022    Under care of team 06/21/2022    GI - DR. DELGADILLO    Under care of team     CARDIOLOGY - DR. JUAREZ/ last seen 8-2022    Wears glasses     Wears partial dentures Bottom    Wellness examination 06/21/2022    PCP - Saba Chavis CNP - LAST VISIT - 5/2022       Past Surgical History:    Past Surgical History:   Procedure Laterality Date    APPENDECTOMY      CARDIAC CATHETERIZATION  08/17/2022    NEGATIVE    COLONOSCOPY  01/06/2012    COLONOSCOPY N/A 04/29/2019    COLONOSCOPY POLYPECTOMY SNARE/COLD BIOPSY performed by Marc Marie MD at [facility] 09/13/2022    CYSTOSCOPY, TUR BLADDER TUMOR (GYRUS), RIGHT URETERAL STENT PLACEMENT performed by Vanessa Grullon MD at [address] ARTHROSCOPY Right 04/07/2022    KNEE ARTHROSCOPIC PARTIAL LATERAL AND PARTIAL MEDIAL MENISCECTOMY performed by Lora Mendiola MD at [address]  10/05/2018    SKIN CANCER [address]    Perforated Stomach repair    TONSILLECTOMY      TURP Right 09/13/2022    CYSTOSCOPY, TUR BLADDER TUMOR (GYRUS), RIGHT URETERAL STENT PLACEMENT    UMBILICAL HERNIA REPAIR  07/2011    UPPER GASTROINTESTINAL ENDOSCOPY N/A 04/29/2019    [facility]SY [facility] [address]       Medications:      Current Facility-Administered Medications:     triamcinolone acetonide (KENALOG-40) injection 40 mg, 40 mg, IntraMUSCular, Once, Jordon Lester, VIKY - CNP    Current Outpatient Medications:     simvastatin (ZOCOR) 20 MG tablet, Take 1 tablet by mouth nightly, Disp: 90 tablet, Rfl: 1    allopurinol (ZYLOPRIM) 300 MG tablet, Take 1 tablet by mouth daily, Disp: 90 tablet, Rfl: 1    aspirin 81 MG tablet, Take 1 tablet by mouth daily Hold for 7 days after surgery (Patient taking differently: Take 81 mg by mouth daily Pt.  Stopped 10/28/22), Disp: 30 tablet, Rfl: 3    Hyoscyamine Sulfate SL (LEVSIN/SL) 0.125 MG SUBL, Place 1 tablet under the tongue every 8 hours as needed (bladder spasm) (Patient not taking: Reported on 10/28/2022), Disp: 6 each, Rfl: 0    ALPRAZolam (XANAX) 0.25 MG tablet, Take 0.25 mg by mouth nightly as needed for Sleep., Disp: , Rfl:     fluticasone (FLONASE) 50 MCG/ACT nasal spray, 2 sprays by Nasal route daily, Disp: 3 each, Rfl: 3    hydroCHLOROthiazide (HYDRODIURIL) 25 MG tablet, Take 1 tablet by mouth every morning (Patient not taking: No sig reported), Disp: 90 tablet, Rfl: 1    SPIRIVA RESPIMAT 2.5 MCG/ACT AERS inhaler, USE 2 INHALATIONS DAILY, Disp: 12 g, Rfl: 3    amLODIPine (NORVASC) 10 MG tablet, Take 10 mg by mouth at bedtime , Disp: , Rfl:     TART CHERRY PO, Take 1 capsule by mouth daily, Disp: , Rfl:     lansoprazole (PREVACID) 30 MG delayed release capsule, TAKE 1 CAPSULE DAILY, Disp: 90 capsule, Rfl: 3    vitamin D3 (CHOLECALCIFEROL) 25 MCG (1000 UT) TABS tablet, Take 1 tablet by mouth daily, Disp: 90 tablet, Rfl: 3    Allergies: Allergies   Allergen Reactions    Compazine Spansule [Prochlorperazine]      MUSCULAR DYSTONIA    Metoclopramide      MUSCULAR DYSTONIA    Seasonal        Social History:   Social History     Socioeconomic History    Marital status:      Spouse name: Not on file    Number of children: Not on file    Years of education: Not on file    Highest education level: Not on file   Occupational History    Not on file   Tobacco Use    Smoking status: Former     Packs/day: 1.00     Years: 30.00     Pack years: 30.00     Types: Cigarettes     Start date: 5     Quit date: 2014     Years since quittin.8     Passive exposure: Never    Smokeless tobacco: Never   Vaping Use    Vaping Use: Never used   Substance and Sexual Activity    Alcohol use:  Yes     Alcohol/week: 6.0 standard drinks     Types: 6 Cans of beer per week     Comment: 2-6 cans beer/day    Drug use: No    Sexual activity: Not on file   Other Topics Concern    Not on file   Social History Narrative    Not on file     Social Determinants of Health     Financial Resource Strain: Low Risk     Difficulty of Paying Living Expenses: Not hard at all   Food Insecurity: No Food Insecurity    Worried About [address] in the Last Year: Never true    Ran Out of Food in the Last Year: Never true   Transportation Needs: Not on file   Physical Activity: Not on file   Stress: Not on file   Social Connections: Not on file   Intimate Partner Violence: Not on file   Housing Stability: Not on file       Family History:    Family History   Problem Relation Age of Onset    Cancer Mother         Thyroid CA-  age 39    Cancer Sister         Thyroid CA    Thyroid Disease Sister     Heart Attack Paternal Grandfather     Emphysema Paternal Grandfather         Smoker       REVIEW OF SYSTEMS:  A comprehensive 14 point review of systems was obtained. Constitutional: No fatigue  Eyes: No blurry vision  Ears, nose, mouth, throat, face: No ringing in the ears; no facial droop. Respiratory: No cough or cold. Cardiovascular: No palpitations  Gastrointestinal: No diarrhea or constipation. Genitourinary: No burning with urination  Integument/Skin: No rashes  Hematologic/Lymphatic: No easy bruising  Musculoskeletal: No muscle pains  Neurologic: No weakness in the extremities. Psychiatric: No depression or suicidal thoughts. Endocrine: No heat or cold intolerances. Allergic/Immunologic: No current seasonal allergies; no skin hives. Physical Exam:      This a 69 y.o. male   There were no vitals filed for this visit. Constitutional: Patient in no acute distress. Neuro: alert and oriented to person place and time. Head: Atraumatic and normocephalic. Neck: Trachea midline. Ext: 2+ radial pulses bilaterally. Psych: Mood and affect normal.  Skin: No rashes or bruising present. Lungs: Respiratory effort normal.  Cardiovascular:  Regular rhythm. Abdomen: Soft, non-tender, non-distended. Neither side has CVA tenderness on exam.  Bladder non-tender and not distended. Lymphatics: no palpable lymphadenopathy  Pelvic exam: deferred. Rectal exam not indicated. Labs:  No results for input(s): WBC, HGB, HCT, MCV, PLT in the last 72 hours.   No results for input(s): NA, K, CL, CO2, PHOS, BUN, CREATININE, CA in the last 72 hours. No results for input(s): COLORU, PHUR, LABCAST, WBCUA, RBCUA, MUCUS, TRICHOMONAS, YEAST, BACTERIA, CLARITYU, SPECGRAV, LEUKOCYTESUR, UROBILINOGEN, Marchia Michael in the last 72 hours. Invalid input(s): NITRATE, GLUCOSEUKETONESUAMORPHOUS        -----------------------------------------------------------------  Imaging Results:  No results found.     Assessment and Plan   Impression:   Problem List:  Bladder Mass    Plan:  OR for cystoscopy, TURBT, Right stent removal        Dante Levy MD  10:33 PM 11/3/2022

## 2022-11-07 PROBLEM — J30.1 SEASONAL ALLERGIC RHINITIS DUE TO POLLEN: Status: RESOLVED | Noted: 2018-08-14 | Resolved: 2022-11-07

## 2022-11-07 PROBLEM — E66.01 CLASS 2 SEVERE OBESITY DUE TO EXCESS CALORIES WITH SERIOUS COMORBIDITY AND BODY MASS INDEX (BMI) OF 38.0 TO 38.9 IN ADULT (HCC): Status: RESOLVED | Noted: 2021-04-30 | Resolved: 2022-11-07

## 2022-11-07 PROBLEM — K43.9 VENTRAL HERNIA WITHOUT OBSTRUCTION OR GANGRENE: Status: RESOLVED | Noted: 2018-08-14 | Resolved: 2022-11-07

## 2022-11-07 PROBLEM — C67.9 MALIGNANT NEOPLASM OF URINARY BLADDER (HCC): Status: ACTIVE | Noted: 2022-11-07

## 2022-11-07 PROBLEM — C44.619 BASAL CELL CARCINOMA OF LEFT UPPER ARM: Status: RESOLVED | Noted: 2018-02-09 | Resolved: 2022-11-07

## 2022-11-07 PROBLEM — E66.812 CLASS 2 SEVERE OBESITY DUE TO EXCESS CALORIES WITH SERIOUS COMORBIDITY AND BODY MASS INDEX (BMI) OF 38.0 TO 38.9 IN ADULT: Status: RESOLVED | Noted: 2021-04-30 | Resolved: 2022-11-07

## 2022-11-07 PROBLEM — H93.19 TINNITUS AURIUM: Status: RESOLVED | Noted: 2017-08-09 | Resolved: 2022-11-07

## 2022-11-07 PROBLEM — K22.70 BARRETT'S ESOPHAGUS WITHOUT DYSPLASIA: Status: ACTIVE | Noted: 2018-08-14

## 2022-11-07 PROBLEM — B00.1 COLD SORE: Status: RESOLVED | Noted: 2018-08-14 | Resolved: 2022-11-07

## 2022-11-10 ENCOUNTER — OFFICE VISIT (OUTPATIENT)
Dept: UROLOGY | Age: 67
End: 2022-11-10
Payer: MEDICARE

## 2022-11-10 VITALS
BODY MASS INDEX: 38.64 KG/M2 | WEIGHT: 276 LBS | SYSTOLIC BLOOD PRESSURE: 133 MMHG | HEIGHT: 71 IN | DIASTOLIC BLOOD PRESSURE: 85 MMHG | HEART RATE: 86 BPM

## 2022-11-10 DIAGNOSIS — C67.8 MALIGNANT NEOPLASM OF OVERLAPPING SITES OF BLADDER (HCC): Primary | ICD-10-CM

## 2022-11-10 PROCEDURE — 3074F SYST BP LT 130 MM HG: CPT | Performed by: UROLOGY

## 2022-11-10 PROCEDURE — 1123F ACP DISCUSS/DSCN MKR DOCD: CPT | Performed by: UROLOGY

## 2022-11-10 PROCEDURE — 3078F DIAST BP <80 MM HG: CPT | Performed by: UROLOGY

## 2022-11-10 PROCEDURE — 99213 OFFICE O/P EST LOW 20 MIN: CPT | Performed by: UROLOGY

## 2022-11-10 ASSESSMENT — ENCOUNTER SYMPTOMS
ABDOMINAL PAIN: 0
CONSTIPATION: 0
DIARRHEA: 0
RESPIRATORY NEGATIVE: 1
NAUSEA: 0
SHORTNESS OF BREATH: 0
GASTROINTESTINAL NEGATIVE: 1
WHEEZING: 0
EYES NEGATIVE: 1
VOMITING: 0
BACK PAIN: 0
EYE REDNESS: 0
COUGH: 0
EYE PAIN: 0

## 2022-11-10 NOTE — PROGRESS NOTES
[address] [facility] [id number]  Dept: [address] [facility] Urology Office Note - Established    Patient:  Christi Yap  YOB: 1955  Date: 11/10/2022    The patient is a 69 y.o. male who presents todayfor evaluation of the following problems:   Chief Complaint   Patient presents with    Follow-up     S/p TURBT       HPI  Here after bladder fulg. Urinating well has bladder cancer    Summary of old records: N/A    Additional History: N/A    Procedures Today: N/A    Urinalysis today:  No results found for this visit on 11/10/22. Last several PSA's:  Lab Results   Component Value Date    PSA 1.22 10/11/2022    PSA 1.27 06/08/2022    PSA 1.76 11/04/2021     Last total testosterone:  No results found for: TESTOSTERONE    AUA Symptom Score (11/10/2022):                                Last BUN and creatinine:  Lab Results   Component Value Date    BUN 15 10/11/2022     Lab Results   Component Value Date    CREATININE 1.02 10/11/2022       Additional Lab/Culture results: none    Imaging Reviewed during this Office Visit: none  (results were independently reviewed by physician and radiology report verified)    PAST MEDICAL, FAMILY AND SOCIAL HISTORY UPDATE:  Past Medical History:   Diagnosis Date    Allergic rhinitis     Anxiety     Arthritis     Gilliam esophagus     RESOLVED    Basal cell carcinoma of left upper arm 02/09/2018    bladder    Bladder mass     Bleeding ulcer     Also states hx of \"perforated stomach\" in 1985    Chest tightness     Chronic pain of right knee     Colon polyps     COPD (chronic obstructive pulmonary disease) ([facility] [address]     COVID-19 09/23/2022    sore throat x 1 day; received Paxlovid    Duodenal hemorrhage     Environmental allergies     GERD (gastroesophageal reflux disease)     Gout     RT HAND    H. pylori infection     History of blood transfusion 1985    DUODENAL BLEED/ no reaction    Hyperlipidemia     Hyperplastic colon polyp     Hypertension     Insomnia     Lung nodule     has yearly follow up CT scans    Mild sleep apnea     no machine    Palpitations     Perforated stomach ([address] 1985    Pure hypercholesterolemia     Tubular adenoma of colon 01/2012     Dr. Demetrius Purcell colonoscopy    Under care of team 06/21/2022    UROLOGY - DR. Aguilar 192 - LAST VISIT 6/2022    Under care of team 06/21/2022    PULMONOLOGY- DR. Adrianna Wooetn - LAST VISIT 10/2022    Under care of team 06/21/2022    ORTHO - DR. BROWER - LAST VISIT 4/2022    Under care of team 06/21/2022    GI - DR. DELGADILLO    Under care of team     CARDIOLOGY - DR. JUAREZ/ last seen 8-2022    Wears glasses     Wears partial dentures     Bottom    Wellness examination 06/21/2022    PCP - Ramona Prabhakar CNP - LAST VISIT - 5/2022     Past Surgical History:   Procedure Laterality Date    APPENDECTOMY      CARDIAC CATHETERIZATION  08/17/2022    NEGATIVE    COLONOSCOPY  01/06/2012    COLONOSCOPY N/A 04/29/2019    COLONOSCOPY POLYPECTOMY SNARE/COLD BIOPSY performed by Randall Ruiz MD at [address] N/A 09/13/2022    CYSTOSCOPY, TUR BLADDER TUMOR (GYRUS), RIGHT URETERAL STENT PLACEMENT performed by Martinez Centeno MD at [address]  11/04/2022    CYSTOSCOPY TRANSURETHRAL RESECTION BLADDER TUMOR, GYRUS, RIGHT STENT REMOVAL    CYSTOSCOPY N/A 11/4/2022    CYSTOSCOPY TRANSURETHRAL RESECTION BLADDER TUMOR, GYRUS, RIGHT STENT REMOVAL performed by Martinez Centeno MD at [address] ARTHROSCOPY Right 04/07/2022    KNEE ARTHROSCOPIC PARTIAL LATERAL AND PARTIAL MEDIAL MENISCECTOMY performed by Myriam Galaviz MD at [address] [address]  10/05/2018    SKIN CANCER [address]    Perforated Stomach repair    TONSILLECTOMY      UMBILICAL HERNIA REPAIR  07/2011    UPPER GASTROINTESTINAL ENDOSCOPY N/A 04/29/2019    EGD BIOPSY performed by Randall Ruiz MD at [address] [facility] History   Problem Relation Age of Onset    Cancer Mother         Thyroid CA-  age 39    Cancer Sister         Thyroid CA    Thyroid Disease Sister     Heart Attack Paternal Grandfather     Emphysema Paternal Grandfather         Smoker     Outpatient Medications Marked as Taking for the 11/10/22 encounter (Office Visit) with Shanon Chew MD   Medication Sig Dispense Refill    simvastatin (ZOCOR) 20 MG tablet Take 1 tablet by mouth nightly 90 tablet 1    allopurinol (ZYLOPRIM) 300 MG tablet Take 1 tablet by mouth daily 90 tablet 1    aspirin 81 MG tablet Take 1 tablet by mouth daily Hold for 7 days after surgery (Patient taking differently: Take 81 mg by mouth daily Pt. Stopped 10/28/22) 30 tablet 3    SPIRIVA RESPIMAT 2.5 MCG/ACT AERS inhaler USE 2 INHALATIONS DAILY 12 g 3    amLODIPine (NORVASC) 10 MG tablet Take 10 mg by mouth at bedtime       TART CHERRY PO Take 1 capsule by mouth daily      lansoprazole (PREVACID) 30 MG delayed release capsule TAKE 1 CAPSULE DAILY 90 capsule 3    vitamin D3 (CHOLECALCIFEROL) 25 MCG (1000 UT) TABS tablet Take 1 tablet by mouth daily 90 tablet 3       Compazine spansule [prochlorperazine], Metoclopramide, and Seasonal  Social History     Tobacco Use   Smoking Status Former    Packs/day: 1.00    Years: 30.00    Pack years: 30.00    Types: Cigarettes    Start date: 5    Quit date: 2014    Years since quittin.8    Passive exposure: Never   Smokeless Tobacco Never     (Ifpatient a smoker, smoking cessation counseling offered)    Social History     Substance and Sexual Activity   Alcohol Use Yes    Alcohol/week: 6.0 standard drinks    Types: 6 Cans of beer per week    Comment: 2-6 cans beer/day       REVIEW OF SYSTEMS:  Review of Systems    Physical Exam:      Vitals:    11/10/22 1201   BP: 133/85   Pulse: 86     Body mass index is 38.49 kg/m². Patient is a 69 y.o. male in no acute distress and alert and oriented to person, place and time.   Physical Exam  Constitutional: Patient in no acute distress. Neuro: Alert and oriented to person, place and time. Psych: Mood normal, affect normal  Skin: No rash noted  HEENT: Head: Normocephalic andatraumatic  Conjunctivae and EOM are normal. Pupils are equal, round  Nose:Normal  Right External Ear: Normal; Left External Ear: Normal  Mouth: Mucosa Moist  Neck: Supple  Lungs: Respiratory effort is normal  Cardiovascular: Warm & Pink  Abdomen: Soft, non-tender, non-distended with no CVA,  No flank tenderness,  Or hepatosplenomegaly     Assessment and Plan      1. Malignant neoplasm of overlapping sites of bladder [facility])           Plan:       Return in about 3 months (around 2/10/2023) for Cystoscopy. Prescriptions Ordered:  No orders of the defined types were placed in this encounter. Orders Placed:  No orders of the defined types were placed in this encounter. Edinson Villalobos MD    Agree with the ROS entered by the MA.

## 2022-11-28 NOTE — H&P
HISTORY and [address]       NAME:  Bethel Lino  MRN: [id number]   YOB: 1955   Date: 4/17/2019   Age: 70 y.o. Gender: male       COMPLAINT AND PRESENT HISTORY:   Kinga Camacho is a 70 yr old male who is here for Pre Admission Testing, He is to have an EGD and colonoscopy. He had had an tubular adenoma of the colon, Has no change in bowel habits, No blood in stool. No change in diameter of stool   In 2000 had some duodenal bleeding from H Pylori   He has GERD but now controlled He had some Barretts in the esophagus but that healed, He has no difficulty swallowing, No problems with elimination. Has no [address] colon cancer.     PAST MEDICAL HISTORY     Past Medical History:   Diagnosis Date    Allergic rhinitis     Anxiety     Arthritis     Gilliam esophagus     Bleeding ulcer     COPD (chronic obstructive pulmonary disease) ([address]     Environmental allergies     GERD (gastroesophageal reflux disease)     Hyperlipidemia     Hyperplastic colon polyp     Hypertension     Lung nodule     has follow up CT scans    Perforated stomach ([address] 1985    Pure hypercholesterolemia     Tubular adenoma of colon 1/2012     Dr. Charlotte Edwards colonoscopy         SURGICAL HISTORY       Past Surgical History:   Procedure Laterality Date    APPENDECTOMY      COLONOSCOPY      COLONOSCOPY  01/06/2012    HERNIA REPAIR      NASAL SINUS SURGERY  10/05/2018    STOMACH SURGERY      Perforated Stomach repair    TONSILLECTOMY      UMBILICAL HERNIA REPAIR  7/2011    UPPER GASTROINTESTINAL ENDOSCOPY           FAMILY HISTORY     Family History   Problem Relation Age of Onset    Cancer Mother         thyroid    Thyroid Disease Sister     Heart Attack Paternal Grandfather          SOCIAL HISTORY       Social History     Socioeconomic History    Marital status:      Spouse name: None    Number of children: None    Years of education: None    Highest education level: None Has The Growth Been Previously Biopsied?: has been previously biopsied Occupational History    None   Social Needs    Financial resource strain: None    Food insecurity:     Worry: None     Inability: None    Transportation needs:     Medical: None     Non-medical: None   Tobacco Use    Smoking status: Former Smoker     Packs/day: 1.00     Years: 30.00     Pack years: 30.00     Start date:      Last attempt to quit: 2014     Years since quittin.3    Smokeless tobacco: Never Used   Substance and Sexual Activity    Alcohol use: Never     Frequency: Never    Drug use: No    Sexual activity: None   Lifestyle    Physical activity:     Days per week: None     Minutes per session: None    Stress: None   Relationships    Social connections:     Talks on phone: None     Gets together: None     Attends Gnosticism service: None     Active member of club or organization: None     Attends meetings of clubs or organizations: None     Relationship status: None    Intimate partner violence:     Fear of current or ex partner: None     Emotionally abused: None     Physically abused: None     Forced sexual activity: None   Other Topics Concern    None   Social History Narrative    None           REVIEW OF SYSTEMS      Allergies   Allergen Reactions    Prochlorperazine Edisylate      seizure    Reglan [Metoclopramide Hcl]      seizure    Seasonal        Current Outpatient Medications on File Prior to Encounter   Medication Sig Dispense Refill    lansoprazole (PREVACID) 30 MG delayed release capsule Take 1 capsule by mouth daily 90 capsule 3    fluticasone (FLONASE) 50 MCG/ACT nasal spray 2 sprays by Nasal route daily 3 Bottle 11    SPIRIVA RESPIMAT 2.5 MCG/ACT AERS inhaler USE 2 INHALATIONS DAILY 3 Inhaler 0    Calcium Carb-Cholecalciferol (CALCIUM + D3) 600-200 MG-UNIT TABS tablet Take 2 tablets by mouth daily 60 tablet     metroNIDAZOLE (METROGEL) 0.75 % gel apply to the affected area once daily for 30 days  2    simvastatin (ZOCOR) 20 MG tablet TAKE 1 TABLET NIGHTLY 90 tablet 3    amLODIPine (NORVASC) 10 MG tablet TAKE 1 TABLET DAILY (Patient taking differently: TAKE 1 TABLET DAILY, nightly) 90 tablet 3    acyclovir (ZOVIRAX) 800 MG tablet Si tablet in the am and 1 in the pm for each cold sore, may repeat as needed. (total of 2 tablets for each outbreak) 20 tablet 0    aspirin 81 MG tablet Take 81 mg by mouth daily.  bisacodyl (DULCOLAX) 5 MG EC tablet TAKE 4 TABS AT 10 AM DAY PRIOR TO COLONOSCOPY 4 tablet 0    polyethylene glycol (GLYCOLAX) powder Use as directed by following your patient instructions given by office. 255 g 0    polyethylene glycol (GLYCOLAX) powder Use as directed by following your patient instructions given by office. 255 g 0    ALPRAZolam (XANAX) 0.25 MG tablet Take 1 tablet by mouth every 12 hours as needed for Anxiety for up to 30 days. . 60 tablet 0     Current Facility-Administered Medications on File Prior to Encounter   Medication Dose Route Frequency Provider Last Rate Last Dose    triamcinolone acetonide (KENALOG-40) injection 40 mg  40 mg Intramuscular Once Merl Been, APRN - CNP           Negative except for what is mentioned in the HPI. GENERAL PHYSICAL EXAM     Vitals: BP (!) 158/81   Pulse 71   Temp 98.4 °F (36.9 °C) (Oral)   Resp 16   Ht 5' 10\" (1.778 m)   Wt 264 lb (119.7 kg)   BMI 37.88 kg/m²  Body mass index is 37.88 kg/m². GENERAL APPEARANCE:   Lizbeth Mcnair is 70 y.o.,  male, mildly obese,  He has no pain at this time                            SKIN:  Warm, dry,No skin lesion               HEAD:  Normocephalic, Face symmetrical.                  EYES:  MYRA EOMI and wears glasses . EARS:  No  hearing loss. NOSE:  Nares are patent                   THROAT:  No erythema of pharynx. No loose dentition                  NECK:   Has no adenopathy, Good ROM of neck                  CHEST:  Symmetrical and equal on expansion.                  HEART:  HT regular  No murmer LUNGS:   Good chest excursion,  Breath sounds are clear, No wheezes           ABDOMEN:   Abdomen is obese and not tender,  Is soft on palpation. . No palpable organomegaly. LYMPHATICS:  No palpable cervical lymphadenopathy. LOCOMOTOR, BACK AND SPINE:  No tenderness or deformities. EXTREMITIES:  Good range of motion of upper and lower extremities, No pedal edema. Grasp strength is 5/5  Strong peripheral pulses     NEUROLOGIC:  Alert and color good, Speech clear The patient is conscious, alert, oriented, No apparent focal sensory or motor deficits.                       PROVISIONAL DIAGNOSES / SURGERY:      For EGD and colonoscopy  Patient Active Problem List    Diagnosis Date Noted    Chronic pain of right knee 02/13/2019    Osteopenia of right lower leg 01/29/2019    Mass of right thigh 01/22/2019    Seasonal allergic rhinitis due to pollen 08/14/2018    Cold sore 08/14/2018    Ventral hernia without obstruction or gangrene 08/14/2018    Pulmonary nodules 08/14/2018    Acute pain of right shoulder 02/09/2018    Basal cell carcinoma of left upper arm 02/09/2018    Essential hypertension 08/09/2017    Pure hypercholesterolemia 08/09/2017    Chronic obstructive pulmonary disease ([address] 08/09/2017    Bilateral hearing loss 08/09/2017    Tinnitus aurium 08/09/2017    History of smoking 06/12/2015    Tubular adenoma of colon 06/24/2013    Allergic rhinitis     Anxiety     Hyperplastic colon polyp     Gilliam's esophagus without dysplasia     GERD (gastroesophageal reflux disease)     Bleeding ulcer     Environmental allergies            Roberto Doctor, APRN - CNP on 4/17/2019 at 3:21 PM

## 2023-02-08 ENCOUNTER — OFFICE VISIT (OUTPATIENT)
Dept: GASTROENTEROLOGY | Age: 68
End: 2023-02-08
Payer: MEDICARE

## 2023-02-08 VITALS
BODY MASS INDEX: 39.76 KG/M2 | DIASTOLIC BLOOD PRESSURE: 80 MMHG | WEIGHT: 284 LBS | HEIGHT: 71 IN | SYSTOLIC BLOOD PRESSURE: 170 MMHG

## 2023-02-08 DIAGNOSIS — K63.5 HYPERPLASTIC COLONIC POLYP, UNSPECIFIED PART OF COLON: Primary | ICD-10-CM

## 2023-02-08 DIAGNOSIS — Z86.010 H/O ADENOMATOUS POLYP OF COLON: ICD-10-CM

## 2023-02-08 DIAGNOSIS — K62.5 HEMORRHAGE OF RECTUM AND ANUS: Primary | ICD-10-CM

## 2023-02-08 DIAGNOSIS — E66.01 SEVERE OBESITY (BMI 35.0-39.9) WITH COMORBIDITY (HCC): ICD-10-CM

## 2023-02-08 DIAGNOSIS — K21.9 CHRONIC GERD: ICD-10-CM

## 2023-02-08 PROCEDURE — 1123F ACP DISCUSS/DSCN MKR DOCD: CPT | Performed by: INTERNAL MEDICINE

## 2023-02-08 PROCEDURE — 3077F SYST BP >= 140 MM HG: CPT | Performed by: INTERNAL MEDICINE

## 2023-02-08 PROCEDURE — 99204 OFFICE O/P NEW MOD 45 MIN: CPT | Performed by: INTERNAL MEDICINE

## 2023-02-08 PROCEDURE — 3078F DIAST BP <80 MM HG: CPT | Performed by: INTERNAL MEDICINE

## 2023-02-08 ASSESSMENT — ENCOUNTER SYMPTOMS
GASTROINTESTINAL NEGATIVE: 1
ALLERGIC/IMMUNOLOGIC NEGATIVE: 1
RESPIRATORY NEGATIVE: 1

## 2023-02-08 NOTE — PROGRESS NOTES
Reason for Referral:   No referring provider defined for this encounter. Chief Complaint   Patient presents with    Other     Pt here for colon recall. Pts last colon was 2019. Pt is on aspirin daily. Pt denies any symptoms at this time. 1. Hemorrhage of rectum and anus    2. H/O adenomatous polyp of colon    3. Severe obesity (BMI 35.0-39. 9) with comorbidity ([address]    4. Chronic GERD            HISTORY OF PRESENT ILLNESS:   Patient seen with a history of intermittent hematochezia. Patient is known to have intermittent hematochezia in the last several months and he felt that this may be secondary to hemorrhoids. In the last couple of weeks did not notice hematochezia. He does have mild constipation. No diarrhea. Does not strain at bowel movements. No history of anticoagulation therapy. Patient has chronic heartburns. Has been taking lansoprazole. In the past he was diagnosed to have Gilliam's however this diagnosis was in question. Previous records reviewed. This patient had a colonoscopy done in 2019 and at that time was found to have a couple of polyps that were removed, histology revealed tubular adenoma. He had prostatectomy for malignancy in September 2022. He has a good appetite. No dysphagia. No weight loss. No prior history of known liver disease. No cardiopulmonary issues. Past Medical,Family, and Social History reviewed and does contribute to the patient presentingcondition. Patient's PMH/PSH,SH,PSYCH Hx, MEDs, ALLERGIES, and ROS were all reviewed and updated in the appropriate sections.     PAST MEDICAL HISTORY:  Past Medical History:   Diagnosis Date    Allergic rhinitis     Anxiety     Arthritis     Gilliam esophagus     RESOLVED    Basal cell carcinoma of left upper arm 02/09/2018    bladder    Bladder mass     Bleeding ulcer     Also states hx of \"perforated stomach\" in 1985    Chest tightness     Chronic pain of right knee     Colon polyps     COPD (chronic obstructive pulmonary disease) ([facility]     COVID-19 09/23/2022    sore throat x 1 day; received Paxlovid    Duodenal hemorrhage     Environmental allergies     GERD (gastroesophageal reflux disease)     Gout     RT HAND    H. pylori infection     History of blood transfusion 1985    DUODENAL BLEED/ no reaction    Hyperlipidemia     Hyperplastic colon polyp     Hypertension     Insomnia     Lung nodule     has yearly follow up CT scans    Mild sleep apnea     no machine    Palpitations     Perforated stomach ([facility] 1985    Pure hypercholesterolemia     Tubular adenoma of colon 01/2012     Dr. Madelyn Mora colonoscopy    Under care of team 06/21/2022    UROLOGY - DR. Jeff Ragland - LAST VISIT 6/2022    Under care of team 06/21/2022    PULMONOLOGY- DR. Dianna Stevenson - LAST VISIT 10/2022    Under care of team 06/21/2022    ORTHO - DR. BROWER - LAST VISIT 4/2022    Under care of team 06/21/2022    GI - DR. DELGADILLO    Under care of team     CARDIOLOGY - DR. JUAREZ/ last seen 8-2022    Wears glasses     Wears partial dentures     Bottom    Wellness examination 06/21/2022    PCP - Zack Monroe CNP - LAST VISIT - 5/2022       Past Surgical History:   Procedure Laterality Date    APPENDECTOMY      CARDIAC CATHETERIZATION  08/17/2022    NEGATIVE    COLONOSCOPY  01/06/2012    COLONOSCOPY N/A 04/29/2019    COLONOSCOPY POLYPECTOMY SNARE/COLD BIOPSY performed by Kristal Díaz MD at [address] N/A 09/13/2022    CYSTOSCOPY, TUR BLADDER TUMOR (GYRUS), RIGHT URETERAL STENT PLACEMENT performed by Bethel Moreno MD at [address]  11/04/2022    CYSTOSCOPY TRANSURETHRAL RESECTION BLADDER TUMOR, GYRUS, RIGHT STENT REMOVAL    CYSTOSCOPY N/A 11/4/2022    CYSTOSCOPY TRANSURETHRAL RESECTION BLADDER TUMOR, GYRUS, RIGHT STENT REMOVAL performed by Bethel Moreno MD at [facility] ARTHROSCOPY Right 04/07/2022    KNEE ARTHROSCOPIC PARTIAL LATERAL AND PARTIAL MEDIAL MENISCECTOMY performed by Petey Chisholm MD at [address] [place] [address] SINUS SURGERY  10/05/2018    SKIN CANCER EXCISION      STOMACH SURGERY  1985    Perforated Stomach repair    TONSILLECTOMY      UMBILICAL HERNIA REPAIR  2011    UPPER GASTROINTESTINAL ENDOSCOPY N/A 2019    EGD BIOPSY performed by Boby Rodriguez MD at [facility]:    Current Outpatient Medications:     lansoprazole (PREVACID) 30 MG delayed release capsule, TAKE 1 CAPSULE DAILY, Disp: 90 capsule, Rfl: 3    amLODIPine (NORVASC) 10 MG tablet, TAKE 1 TABLET DAILY, Disp: 90 tablet, Rfl: 3    allopurinol (ZYLOPRIM) 300 MG tablet, Take 1 tablet by mouth daily, Disp: 90 tablet, Rfl: 1    acyclovir (ZOVIRAX) 800 MG tablet, Si tablet in the am and 1 in the pm for each cold sore, may repeat as needed. (total of 2 tablets for each outbreak), Disp: 20 tablet, Rfl: 0    aspirin 81 MG tablet, Take 1 tablet by mouth daily Hold for 7 days after surgery (Patient taking differently: Take 81 mg by mouth daily Pt.  Stopped 10/28/22), Disp: 30 tablet, Rfl: 3    SPIRIVA RESPIMAT 2.5 MCG/ACT AERS inhaler, USE 2 INHALATIONS DAILY, Disp: 12 g, Rfl: 3    TART CHERRY PO, Take 1 capsule by mouth daily, Disp: , Rfl:     vitamin D3 (CHOLECALCIFEROL) 25 MCG (1000 UT) TABS tablet, Take 1 tablet by mouth daily, Disp: 90 tablet, Rfl: 3    simvastatin (ZOCOR) 20 MG tablet, Take 1 tablet by mouth nightly, Disp: 90 tablet, Rfl: 1    fluticasone (FLONASE) 50 MCG/ACT nasal spray, 2 sprays by Nasal route daily, Disp: 3 each, Rfl: 3    ALLERGIES:   Allergies   Allergen Reactions    Compazine Spansule [Prochlorperazine]      MUSCULAR DYSTONIA    Metoclopramide      MUSCULAR DYSTONIA    Seasonal        FAMILY HISTORY:       Problem Relation Age of Onset    Cancer Mother         Thyroid CA-  age 39    Cancer Sister         Thyroid CA    Thyroid Disease Sister     Heart Attack Paternal Grandfather     Emphysema Paternal Grandfather         Smoker         SOCIAL HISTORY:   Social History     Socioeconomic History Marital status:      Spouse name: Not on file    Number of children: Not on file    Years of education: Not on file    Highest education level: Not on file   Occupational History    Not on file   Tobacco Use    Smoking status: Former     Packs/day: 1.00     Years: 30.00     Pack years: 30.00     Types: Cigarettes     Start date: 5     Quit date: 2014     Years since quittin.1     Passive exposure: Never    Smokeless tobacco: Never   Vaping Use    Vaping Use: Never used   Substance and Sexual Activity    Alcohol use: Yes     Alcohol/week: 6.0 standard drinks     Types: 6 Cans of beer per week     Comment: 2-6 cans beer/day    Drug use: No    Sexual activity: Not on file   Other Topics Concern    Not on file   Social History Narrative    Not on file     Social Determinants of Health     Financial Resource Strain: Low Risk     Difficulty of Paying Living Expenses: Not hard at all   Food Insecurity: No Food Insecurity    Worried About Running Out of Food in the Last Year: Never true    Ran Out of Food in the Last Year: Never true   Transportation Needs: Not on file   Physical Activity: Insufficiently Active    Days of Exercise per Week: 2 days    Minutes of Exercise per Session: 10 min   Stress: Not on file   Social Connections: Not on file   Intimate Partner Violence: Not on file   Housing Stability: Not on file       REVIEW OF SYSTEMS:       Review of Systems   Constitutional: Negative. HENT: Negative. Eyes:  Positive for visual disturbance (glasses). Respiratory: Negative. Cardiovascular: Negative. Gastrointestinal: Negative. Endocrine: Negative. Genitourinary: Negative. Musculoskeletal: Negative. Skin: Negative. Allergic/Immunologic: Negative. Neurological: Negative. Hematological: Negative. Psychiatric/Behavioral: Negative. PHYSICAL EXAMINATION: Vital signs reviewed per the nursing documentation.      BP (!) 170/80   Ht 5' 11\" (1.803 m)   Wt 284 lb (128.8 kg)   BMI 39.61 kg/m²   Body mass index is 39.61 kg/m². Physical Exam      LABORATORY DATA: Reviewed  Lab Results   Component Value Date    WBC 10.6 10/11/2022    HGB 14.8 10/11/2022    HCT 44.8 10/11/2022    MCV 93.7 10/11/2022     10/11/2022     10/11/2022    K 5.1 10/11/2022     10/11/2022    CO2 26 10/11/2022    BUN 15 10/11/2022    CREATININE 1.02 10/11/2022    LABPROT 7.3 01/19/2013    LABALBU 3.9 10/11/2022    BILITOT 0.8 10/11/2022    ALKPHOS 82 10/11/2022    AST 16 10/11/2022    ALT 19 10/11/2022         Lab Results   Component Value Date    RBC 4.78 10/11/2022    HGB 14.8 10/11/2022    MCV 93.7 10/11/2022    MCH 31.0 10/11/2022    MCHC 33.0 10/11/2022    RDW 12.2 10/11/2022    MPV 10.4 10/11/2022    BASOPCT 1 05/29/2022    LYMPHSABS 1.70 05/29/2022    MONOSABS 0.90 05/29/2022    NEUTROABS 6.80 05/29/2022    EOSABS 0.10 05/29/2022    BASOSABS 0.10 05/29/2022         DIAGNOSTIC TESTING:     No results found. IMPRESSION: Mr. Kasandra Armstrong is a 69 y.o. male with     Assessment:  1. Hemorrhage of rectum and anus    2. H/O adenomatous polyp of colon    3. Severe obesity (BMI 35.0-39. 9) with comorbidity ([address]    4. Chronic GERD        Plan:  Patient looks stable. He is moderately overweight. Given his past history of colon polyps and also having rectal bleeding, he needs colonoscopy to evaluate recurrent polyps or residual polyps. Also at that time he needs EGD to evaluate chronic GERD and questionable Gilliam's. After discussion, patient understood and verbalized consent. Explained regarding adequate colon preparation risks benefits. Spent 30 minutes providing patient education and counseling. Thank you for allowing me to participate in the care of Mr. Kasandra Armstrong. For any further questions please do not hesitate to contact me. Note is dictated utilizing voice recognition software. Unfortunately this leads to occasional typographical errors.  Please contact our office if you have any questions. I have reviewed and agree with the MA/LPN ROS.      Ridge Becerra MD, [facility]  Board Certified in Gastroenterology and [address] [place] [address] Gastroenterology  Office #: [phone]

## 2023-02-09 ENCOUNTER — HOSPITAL ENCOUNTER (OUTPATIENT)
Age: 68
Setting detail: SPECIMEN
Discharge: HOME | End: 2023-02-09

## 2023-02-09 ENCOUNTER — PROCEDURE VISIT (OUTPATIENT)
Dept: UROLOGY | Age: 68
End: 2023-02-09
Payer: MEDICARE

## 2023-02-09 VITALS
BODY MASS INDEX: 39.76 KG/M2 | SYSTOLIC BLOOD PRESSURE: 138 MMHG | HEIGHT: 71 IN | HEART RATE: 82 BPM | WEIGHT: 284 LBS | DIASTOLIC BLOOD PRESSURE: 94 MMHG

## 2023-02-09 DIAGNOSIS — C67.8 MALIGNANT NEOPLASM OF OVERLAPPING SITES OF BLADDER (HCC): Primary | ICD-10-CM

## 2023-02-09 PROCEDURE — 52000 CYSTOURETHROSCOPY: CPT | Performed by: UROLOGY

## 2023-02-09 RX ORDER — POLYETHYLENE GLYCOL 3350 17 G/17G
POWDER, FOR SOLUTION ORAL
Qty: 238 G | Refills: 0 | Status: SHIPPED | OUTPATIENT
Start: 2023-02-09

## 2023-02-09 RX ORDER — BISACODYL 5 MG
TABLET, DELAYED RELEASE (ENTERIC COATED) ORAL
Qty: 4 TABLET | Refills: 0 | Status: SHIPPED | OUTPATIENT
Start: 2023-02-09

## 2023-02-09 NOTE — PROGRESS NOTES
Cystoscopy Operative Note (2/9/23)  Surgeon: Alexa Reynoso MD  Anesthesia: Urethral 2% Xylocaine   Indications: Bladder Cancer  Position: Dorsal Lithotomy    Findings:   Risks and Benefits discussed with patient prior to procedure. The patient was prepped and draped in the usual sterile fashion. The flexible cystoscope was advanced through the urethra and into the bladder. The bladder was thoroughly inspected and the following was noted:    Residual Urine: none  Urethra: normal appearing urethra with no masses, tenderness or lesions  Prostate: partially obstructing lateral lobes of prostate; median lobe present? no.   Bladder: small tumor right lateral wall and left lateral wall  Ureters: Clear efflux from both ureters. Orifices with normal configuration and location. The cystoscope was removed. The patient tolerated the procedure well. CYSTO BLADDER BIOPSY AND FULG AT [address] with the ROS entered by the MA.

## 2023-02-10 DIAGNOSIS — R31.0 GROSS HEMATURIA: Primary | ICD-10-CM

## 2023-02-10 DIAGNOSIS — R31.0 GROSS HEMATURIA: ICD-10-CM

## 2023-02-11 LAB
MICROORGANISM SPEC CULT: NO GROWTH
SPECIMEN DESCRIPTION: NORMAL

## 2023-02-14 ENCOUNTER — TELEPHONE (OUTPATIENT)
Dept: UROLOGY | Age: 68
End: 2023-02-14

## 2023-02-15 ENCOUNTER — HOSPITAL ENCOUNTER (OUTPATIENT)
Dept: PREADMISSION TESTING | Age: 68
Discharge: HOME | End: 2023-02-19

## 2023-02-15 VITALS — BODY MASS INDEX: 38.5 KG/M2 | HEIGHT: 71 IN | WEIGHT: 275 LBS

## 2023-02-15 NOTE — PROGRESS NOTES

## 2023-02-17 ENCOUNTER — TELEPHONE (OUTPATIENT)
Dept: UROLOGY | Age: 68
End: 2023-02-17

## 2023-02-17 NOTE — TELEPHONE ENCOUNTER
Cysto, bladder bx and fulguration @ ST 3/7/23 2:15pm **STOP BLOOD THINNERS 2/28/23**   PAT same day         Spoke with patient procedure info emailed.

## 2023-02-27 NOTE — PRE-PROCEDURE INSTRUCTIONS
No answer, left message ? Unable to leave message ? yes    When were you told to arrive at hospital ?  [address] 49    Do you have a  ? Are you on any blood thinners ? If yes when did you stop taking ? Do you have your prep Rx filled and instruction ? Nothing to eat the day before , only clear liquids. Are you experiencing any covid symptoms ? Do you have any infections or rash we should be aware of ? Do you have the Hibiclens soap to use the night before and the morning of surgery ? Nothing to eat or drink after midnight, only a sip of water to take any medication instructed to take the night before. Wear comfortable clothing, leave any valuables at home, remove any jewelry and body piercing .

## 2023-02-28 ENCOUNTER — ANESTHESIA EVENT (OUTPATIENT)
Dept: ENDOSCOPY | Age: 68
End: 2023-02-28
Payer: MEDICARE

## 2023-03-01 ENCOUNTER — ANESTHESIA (OUTPATIENT)
Dept: ENDOSCOPY | Age: 68
End: 2023-03-01
Payer: MEDICARE

## 2023-03-01 ENCOUNTER — HOSPITAL ENCOUNTER (OUTPATIENT)
Age: 68
Setting detail: OUTPATIENT SURGERY
Discharge: HOME | End: 2023-03-01
Attending: INTERNAL MEDICINE | Admitting: INTERNAL MEDICINE
Payer: MEDICARE

## 2023-03-01 VITALS
HEIGHT: 71 IN | SYSTOLIC BLOOD PRESSURE: 148 MMHG | OXYGEN SATURATION: 98 % | RESPIRATION RATE: 15 BRPM | TEMPERATURE: 97.7 F | DIASTOLIC BLOOD PRESSURE: 84 MMHG | HEART RATE: 62 BPM | WEIGHT: 275 LBS | BODY MASS INDEX: 38.5 KG/M2

## 2023-03-01 DIAGNOSIS — K21.9 GASTROESOPHAGEAL REFLUX DISEASE WITHOUT ESOPHAGITIS: ICD-10-CM

## 2023-03-01 DIAGNOSIS — K62.5 HEMORRHAGE OF RECTUM AND ANUS: ICD-10-CM

## 2023-03-01 PROCEDURE — 88305 TISSUE EXAM BY PATHOLOGIST: CPT

## 2023-03-01 PROCEDURE — 3700000001 HC ADD 15 MINUTES (ANESTHESIA): Performed by: INTERNAL MEDICINE

## 2023-03-01 PROCEDURE — 3609010600 HC COLONOSCOPY POLYPECTOMY SNARE/COLD BIOPSY: Performed by: INTERNAL MEDICINE

## 2023-03-01 PROCEDURE — 3700000000 HC ANESTHESIA ATTENDED CARE: Performed by: INTERNAL MEDICINE

## 2023-03-01 PROCEDURE — 2500000003 HC RX 250 WO HCPCS: Performed by: NURSE ANESTHETIST, CERTIFIED REGISTERED

## 2023-03-01 PROCEDURE — 2709999900 HC NON-CHARGEABLE SUPPLY: Performed by: INTERNAL MEDICINE

## 2023-03-01 PROCEDURE — 6360000002 HC RX W HCPCS: Performed by: NURSE ANESTHETIST, CERTIFIED REGISTERED

## 2023-03-01 PROCEDURE — 3609012400 HC EGD TRANSORAL BIOPSY SINGLE/MULTIPLE: Performed by: INTERNAL MEDICINE

## 2023-03-01 PROCEDURE — 2580000003 HC RX 258: Performed by: ANESTHESIOLOGY

## 2023-03-01 PROCEDURE — 7100000010 HC PHASE II RECOVERY - FIRST 15 MIN: Performed by: INTERNAL MEDICINE

## 2023-03-01 PROCEDURE — 7100000011 HC PHASE II RECOVERY - ADDTL 15 MIN: Performed by: INTERNAL MEDICINE

## 2023-03-01 RX ORDER — SODIUM CHLORIDE 9 MG/ML
INJECTION, SOLUTION INTRAVENOUS PRN
Status: CANCELLED | OUTPATIENT
Start: 2023-03-01

## 2023-03-01 RX ORDER — LIDOCAINE HYDROCHLORIDE 10 MG/ML
1 INJECTION, SOLUTION EPIDURAL; INFILTRATION; INTRACAUDAL; PERINEURAL
Status: DISCONTINUED | OUTPATIENT
Start: 2023-03-01 | End: 2023-03-01 | Stop reason: HOSPADM

## 2023-03-01 RX ORDER — SODIUM CHLORIDE 0.9 % (FLUSH) 0.9 %
5-40 SYRINGE (ML) INJECTION PRN
Status: CANCELLED | OUTPATIENT
Start: 2023-03-01

## 2023-03-01 RX ORDER — SODIUM CHLORIDE 9 MG/ML
INJECTION, SOLUTION INTRAVENOUS PRN
Status: DISCONTINUED | OUTPATIENT
Start: 2023-03-01 | End: 2023-03-01 | Stop reason: HOSPADM

## 2023-03-01 RX ORDER — SODIUM CHLORIDE 0.9 % (FLUSH) 0.9 %
5-40 SYRINGE (ML) INJECTION PRN
Status: DISCONTINUED | OUTPATIENT
Start: 2023-03-01 | End: 2023-03-01 | Stop reason: HOSPADM

## 2023-03-01 RX ORDER — HYDRALAZINE HYDROCHLORIDE 20 MG/ML
10 INJECTION INTRAMUSCULAR; INTRAVENOUS
Status: CANCELLED | OUTPATIENT
Start: 2023-03-01

## 2023-03-01 RX ORDER — MEPERIDINE HYDROCHLORIDE 25 MG/ML
12.5 INJECTION INTRAMUSCULAR; INTRAVENOUS; SUBCUTANEOUS EVERY 5 MIN PRN
Status: CANCELLED | OUTPATIENT
Start: 2023-03-01

## 2023-03-01 RX ORDER — PROPOFOL 10 MG/ML
INJECTION, EMULSION INTRAVENOUS CONTINUOUS PRN
Status: DISCONTINUED | OUTPATIENT
Start: 2023-03-01 | End: 2023-03-01 | Stop reason: SDUPTHER

## 2023-03-01 RX ORDER — ONDANSETRON 2 MG/ML
4 INJECTION INTRAMUSCULAR; INTRAVENOUS
Status: CANCELLED | OUTPATIENT
Start: 2023-03-01 | End: 2023-03-02

## 2023-03-01 RX ORDER — SODIUM CHLORIDE 0.9 % (FLUSH) 0.9 %
5-40 SYRINGE (ML) INJECTION EVERY 12 HOURS SCHEDULED
Status: DISCONTINUED | OUTPATIENT
Start: 2023-03-01 | End: 2023-03-01 | Stop reason: HOSPADM

## 2023-03-01 RX ORDER — ACETAMINOPHEN 325 MG/1
650 TABLET ORAL
Status: CANCELLED | OUTPATIENT
Start: 2023-03-01 | End: 2023-03-02

## 2023-03-01 RX ORDER — LABETALOL HYDROCHLORIDE 5 MG/ML
10 INJECTION, SOLUTION INTRAVENOUS
Status: CANCELLED | OUTPATIENT
Start: 2023-03-01

## 2023-03-01 RX ORDER — LIDOCAINE HYDROCHLORIDE 20 MG/ML
INJECTION, SOLUTION INFILTRATION; PERINEURAL PRN
Status: DISCONTINUED | OUTPATIENT
Start: 2023-03-01 | End: 2023-03-01 | Stop reason: SDUPTHER

## 2023-03-01 RX ORDER — PROPOFOL 10 MG/ML
INJECTION, EMULSION INTRAVENOUS PRN
Status: DISCONTINUED | OUTPATIENT
Start: 2023-03-01 | End: 2023-03-01 | Stop reason: SDUPTHER

## 2023-03-01 RX ORDER — METOCLOPRAMIDE HYDROCHLORIDE 5 MG/ML
10 INJECTION INTRAMUSCULAR; INTRAVENOUS
Status: CANCELLED | OUTPATIENT
Start: 2023-03-01 | End: 2023-03-02

## 2023-03-01 RX ORDER — SODIUM CHLORIDE, SODIUM LACTATE, POTASSIUM CHLORIDE, CALCIUM CHLORIDE 600; 310; 30; 20 MG/100ML; MG/100ML; MG/100ML; MG/100ML
INJECTION, SOLUTION INTRAVENOUS CONTINUOUS
Status: DISCONTINUED | OUTPATIENT
Start: 2023-03-01 | End: 2023-03-01 | Stop reason: HOSPADM

## 2023-03-01 RX ORDER — FENTANYL CITRATE 0.05 MG/ML
25 INJECTION, SOLUTION INTRAMUSCULAR; INTRAVENOUS EVERY 5 MIN PRN
Status: CANCELLED | OUTPATIENT
Start: 2023-03-01

## 2023-03-01 RX ORDER — SODIUM CHLORIDE 0.9 % (FLUSH) 0.9 %
5-40 SYRINGE (ML) INJECTION EVERY 12 HOURS SCHEDULED
Status: CANCELLED | OUTPATIENT
Start: 2023-03-01

## 2023-03-01 RX ORDER — DIPHENHYDRAMINE HYDROCHLORIDE 50 MG/ML
12.5 INJECTION INTRAMUSCULAR; INTRAVENOUS
Status: CANCELLED | OUTPATIENT
Start: 2023-03-01 | End: 2023-03-02

## 2023-03-01 RX ADMIN — PROPOFOL 125 MCG/KG/MIN: 10 INJECTION, EMULSION INTRAVENOUS at 09:49

## 2023-03-01 RX ADMIN — SODIUM CHLORIDE, POTASSIUM CHLORIDE, SODIUM LACTATE AND CALCIUM CHLORIDE: 600; 310; 30; 20 INJECTION, SOLUTION INTRAVENOUS at 09:27

## 2023-03-01 RX ADMIN — PROPOFOL 30 MG: 10 INJECTION, EMULSION INTRAVENOUS at 09:53

## 2023-03-01 RX ADMIN — LIDOCAINE HYDROCHLORIDE 80 MG: 20 INJECTION, SOLUTION INFILTRATION; PERINEURAL at 09:49

## 2023-03-01 RX ADMIN — PROPOFOL 30 MG: 10 INJECTION, EMULSION INTRAVENOUS at 09:51

## 2023-03-01 RX ADMIN — PROPOFOL 70 MG: 10 INJECTION, EMULSION INTRAVENOUS at 09:49

## 2023-03-01 ASSESSMENT — ENCOUNTER SYMPTOMS
SHORTNESS OF BREATH: 0
STRIDOR: 0

## 2023-03-01 ASSESSMENT — PAIN - FUNCTIONAL ASSESSMENT: PAIN_FUNCTIONAL_ASSESSMENT: 0-10

## 2023-03-01 ASSESSMENT — COPD QUESTIONNAIRES: CAT_SEVERITY: NO INTERVAL CHANGE

## 2023-03-01 ASSESSMENT — LIFESTYLE VARIABLES: SMOKING_STATUS: 0

## 2023-03-01 NOTE — OP NOTE
ESOPHAGOGASTRODUODENOSCOPY   ( EGD )  DATE OF PROCEDURE: 3/1/2023     SURGEON: Sabrina Limon MD    ASSISTANT: None    PREOPERATIVE DIAGNOSIS: Patient has a chronic GERD. Has been taking PPI therapy for several years. Procedure performed to evaluate esophageal pathology    POSTOPERATIVE DIAGNOSIS: No endoscopic evidence of peptic esophagitis or Gilliam's mucosa seen. No hiatal hernia. OPERATION: Upper GI endoscopy with Biopsy    ANESTHESIA: MAC    ESTIMATED BLOOD LOSS: None    COMPLICATIONS: None. SPECIMENS:  Was Obtained: Multiple random biopsies from the body of the esophagus to evaluate for microscopic esophagitis    HISTORY: The patient is a 69y.o. year old male with history of above preop diagnosis. I recommended esophagogastroduodenoscopy with possible biopsy and I explained the risk, benefits, expected outcome, and alternatives to the procedure. Risks included but are not limited to bleeding, infection, respiratory distress, hypotension, and perforation of the esophagus, stomach, or duodenum. Patient understands and is in agreement. PROCEDURE: The patient was given IV conscious sedation. The patient's SPO2 remained above 90% throughout the procedure. Cetacaine spray given. Patient placed in left lateral position. Olympus  videogastroscope was inserted orally under vision into the esophagus without difficulty and advanced into the stomach then through the pylorus up to the second part of duodenum. Findings:    Retropharyngeal area was grossly normal appearing    Esophagus: normal.  Squamocolumnar junction is at about 40 cm and lower esophageal sphincter opens normally no hiatal hernia. No peptic esophagitis seen. No signs of Gilliam's mucosa.     Multiple random biopsies taken from the esophagus to check    Microscopic esophagitis    Stomach:    Fundus and Cardia Examined in Retroflexed View: normal    Body: normal    Antrum: normal    Duodenum:     Descending: normal    Bulb: normal    While withdrawing the scope the above findings were verified and the scope was removed. The patient has tolerated the procedure without unusual events.          Recommendations/Plan:   F/U Biopsies  F/U In Office as instructed  Discussed with the family                   Electronically signed by Manuel Khan MD  on 3/1/2023 at 9:56 AM

## 2023-03-01 NOTE — DISCHARGE INSTRUCTIONS
If patient is on aspirin, hold it for 1 week    To see me in the office in the next 2 weeks    Colonoscopy: What to Expect at [address] [place] [address]  After you have a colonoscopy, you will stay at the clinic for 1 to 2 hours until the medicines wear off. Then you can go home, but you will need to arrange for a ride. Your doctor will tell you when you can eat and do your other usual activities. Your doctor will talk to you about when you will need your next colonoscopy. The results of your test and your risk for colorectal cancer will help your doctor decide how often you need to be checked. After the test, you may be bloated or have gas pains. You may need to pass gas. If a biopsy was done or a polyp was removed, you may have streaks of blood in your stool (feces) for a few days. This care sheet gives you a general idea about how long it will take for you to recover. But each person recovers at a different pace. Follow the steps below to get better as quickly as possible. How can you care for yourself at home? Activity  Rest as much as you need to after you go home. You should be able to go back to your usual activities the day after the test.  Diet  SOFT & LIGHT 1ST MEAL-AVOID GASSY/GREASY FOODS TODAY  Follow your doctors directions for eating. Drink plenty of fluids (unless your doctor has told you not to) to replace the fluids that were lost during the colon prep. Do not drink alcohol. Medicines  If polyps were removed or a biopsy was done during the test, your doctor may tell you not to take aspirin or other anti-inflammatory medicines, such as ibuprofen (Advil, Motrin) and naproxen (Aleve), for a few days. Other instructions  For your safety, you should not drive or operate machinery until the medicine effects are gone and you can think clearly.  Your doctor may tell you not to drive or operate machinery until the day after your test.  Do not sign legal documents or make major decisions until the medicine effects are gone and you can think clearly. The anesthesia medicine can make it hard for you to fully understand what you are agreeing to. Follow-up care is a key part of your treatment and safety. Be sure to make and go to all appointments, and call your doctor if you are having problems. It's also a good idea to know your test results and keep a list of the medicines you take. Call your Doctor if you have any of the following:             Passing blood rectally or vomiting blood (it may be red or black). Persistent nausea or vomiting. Severe abdominal or chest pain, not relieved by passing gas. Fever of 100 or more, chills or excessive sweating. Redness or swelling at the IV site. If you experience shortness of breath or severe chest pain, call 911. Where can you learn more? Go to https://Silver CurvepeZiploop.Eventful. org and sign in to your [employer] account. Enter E264 in the Hadapt box to learn more about Colonoscopy: What to Expect at Home.     If you do not have an account, please click on the Sign Up Now link. © 8280-7918 Healthwise, Incorporated. Care instructions adapted under license by [facility]. This care instruction is for use with your licensed healthcare professional. If you have questions about a medical condition or this instruction, always ask your healthcare professional. [address] any warranty or liability for your use of this information. Content Version: 0.2.912706; Last Revised: February 20, 2013       EGD DISCHARGE INSTRUCTIONS    Activity:  Rest today. No driving, operating machinery, or making any important decisions today. May resume normal activity tomorrow. Diet:  Following EGD eat slowly, chew food well, cut food into small pieces-Avoid greasy, spicy, \"crunchy\" foods X 48 hours.      Call your Doctor if you have any of the following:  -Passing blood rectally or vomiting blood (it may be red or black). -Persistent nausea/vomiting  -Severe abdominal or chest pain not relieved with passing gas  -Fever of 100 degrees or more  -Redness or swelling at the IV site  -Severe sore throat or neck pain     High-Fiber Diet     What Is Fiber? Dietary fiber is a form of carbohydrate found in plants that cannot be digested by humans. All plants contain fiber, including fruits, vegetables, grains, and legumes. Fiber is often classified into two categories: soluble and insoluble. Soluble fiber draws water into the bowel and can help slow digestion. Examples of foods that are high in soluble fiber include oatmeal, oat bran, barley, legumes (eg, beans and peas), apples, and strawberries. Insoluble fiber speeds digestion and can add bulk to the stool. Examples of foods that are high in insoluble fiber include whole-wheat products, wheat bran, cauliflower, green beans, and potatoes. Why Follow a High-Fiber Diet? A high-fiber diet is often recommended to prevent and treat constipation , hemorrhoids , diverticulitis , and irritable bowel syndrome . Eating a high-fiber diet can also help improve your cholesterol levels, lower your risk of coronary heart disease , reduce your risk of type 2 diabetes , and lower your weight. For people with type 1 or 2 diabetes, a high-fiber diet can also help stabilize blood sugar levels. How Much Fiber Should I Eat? A high-fiber diet should contain  20-35 grams  of fiber a day. This is actually the amount recommended for the general adult population; however, most Americans eat only 15 grams of fiber per day. Digestion of Fiber   Eating a higher fiber diet than usual can take some getting used to by your body's digestive system. To avoid the side effects of sudden increases in dietary fiber (eg, gas, cramping, bloating, and diarrhea), increase fiber gradually and be sure to drink plenty of fluids every day.    Tips for Increasing Fiber Intake   Whenever possible, choose whole grains over refined grains (eg, brown rice instead of white rice, whole-wheat bread instead of white bread). Include a variety of grains in your diet, such as wheat, rye, barley, oats, quinoa, and bulgur. Eat more vegetarian-based meals. Here are some ideas: black bean burgers, eggplant lasagna, and veggie tofu stir-boyce. Choose high-fiber snacks, such as fruits, popcorn, whole-grain crackers, and nuts. Make whole-grain cereal or whole-grain toast part of your daily breakfast regime. When eating out, whether ordering a sandwich or dinner, ask for extra vegetables. When baking, replace part of the white flour with whole-wheat flour. Whole-wheat flour is particularly easy to incorporate into a recipe. High-Fiber Diet Eating Guide   Food Category   Foods Recommended   Notes   Grains   Whole-grain breads, muffins, bagels, or kevyn bread Rye bread Whole-wheat crackers or crisp breads Whole-grain or bran cereals Oatmeal, oat bran, or grits Wheat germ Whole-wheat pasta and brown rice   Read the ingredients list on food labels. Look for products that list \"whole\" as the first ingredient (eg, whole-wheat, whole oats). Choose cereals with at least 2 grams of fiber per serving. Vegetables   All vegetables, especially asparagus, bean sprouts, broccoli, [place] sprouts, cabbage, carrots, cauliflower, celery, corn, greens, green beans, green pepper, onions, peas, potatoes (with skin), snow peas, spinach, squash, sweet potatoes, tomatoes, zucchini   For maximum fiber intake, eat the peels of fruits and vegetablesjust be sure to wash them well first.   Fruits   All fruits, especially apples, berries, grapefruits, mangoes, nectarines, oranges, peaches, pears, dried fruits (figs, dates, prunes, raisins)   Choose raw fruits and vegetables over juice, cooked, or cannedraw fruit has more fiber. Dried fruit is also a good source of fiber.    Milk   With the exception of yogurt containing inulin (a type of fiber), dairy foods provide little fiber. Add more fiber by topping your yogurt or cottage cheese with fresh fruit, whole grain or bran cereals, nuts, or seeds. Meats and Beans   All beans and peas, especially Garbanzo beans, kidney beans, lentils, lima beans, split peas, and alfaro beans All nuts and seeds, especially almonds, peanuts, [place] nuts, cashews, peanut butter, walnuts, sesame and sunflower seeds All meat, poultry, fish, and eggs   Increase fiber in meat dishes by adding alfaro beans, kidney beans, black-eyed peas, bran, or oatmeal. If you are following a low-fat diet, use nuts and seeds only in moderation. Fats and Oils   All in moderation   Fats and oils do not provide fiber   Snacks, Sweets, and Condiments   Fruit Nuts Popcorn, whole-wheat pretzels, or trail mix made with dried fruits, nuts, and seeds Cakes, breads, and cookies made with oatmeal or whole-wheat flour   Most snack foods do not provide much fiber. Choose snacks with at least 2 grams of fiber per serving. Last Reviewed: March 2011 Nancy Ramirez MS, MPH, RD   Updated: 3/29/2011      Colon Polypectomy   (Colon Polyp Removal)       Definition   A colon polypectomy is the removal of polyps from the inside lining of the colon (large intestine). A polyp is a mass of tissue. Some types of polyps have the potential to develop into cancer. Most polyps can be removed during a colonoscopy or sigmoidoscopy . A Colon Polyp        2011 [address] [place] [address].   Reasons for Procedure   The purpose of the surgery is to remove a polyp. It is done for cancer prevention. In rare cases, larger polyps can cause troublesome symptoms, such as rectal bleeding, abdominal pain, and bowel irregularities. A polyp removal will relieve these symptoms. Possible Complications   Complications are rare, but no procedure is completely free of risk.  If you are planning to have a polypectomy, your doctor will review a list of possible complications, which may include:   Damage to the colon wall   Bleeding   Infection   Adverse reaction to the sedative   Factors that may increase the risk of complications include:   Type, size, and location of the polyp   Patient factors, such as blood-clotting disorders, substance abuse, or other diseases (eg, obesity , diabetes )   What to Expect   Prior to Procedure    Your doctor will likely do the following:   Physical exam and health history   Review of medicines   Test your stool for hidden blood (called \"occult blood\")   X-rays an exam that uses small amounts of radiation to make a picture of the inside of the body   Barium enema x-ray exam that uses contrast to help better see the colon   Diagnostic colonoscopy or sigmoidoscopyexamination of the inside of the intestine with an endoscope   Your colon must be completely cleaned before the procedure. Any stool left in the intestine will block the view. This preparation may start several days before the procedure. Follow your doctor's instructions, which may include any of the following cleansing methods:   Enemas fluid introduced into the rectum to stimulate a bowel movement   Laxativesmedicines that cause you to have soft bowel movements   A clear-liquid diet   Oral cathartic medicinesa large container of fluid to drink, which stimulates a bowel movement   Leading up to your procedure:   Talk to your doctor about your medicines. You may be asked to stop taking some medicines up to one week before the procedure, like:   Anti-inflammatory drugs (eg, aspirin)   Blood thinners, like clopidogrel (Plavix) or warfarin (Coumadin)   Iron supplements or vitamins containing iron. The night before, eat a light meal. Do not eat or drink anything after midnight. Wear comfortable clothing. If you have diabetes, ask your doctor if you need to adjust your insulin dose. Arrange for a ride home after the procedure. Anesthesia    You will receive a sedative.  This will help you relax. You will be drowsy but awake. Description of the Procedure    You will be asked to lie on your side or on your back. A scope, a long flexible tube with a camera on the end, will be inserted through the anus. It will be slowly pushed through the rectum to the colon. The scope will also add air to open the colon. Using the scope, the doctor will locate the polyp. The polyp will be snipped off with a wire snare from the scope. In some cases, the polyp may be destroyed with an electric current. The electric current is also used to close the wound and stop bleeding. The polyps will then be removed for lab testing. When the doctor is finished, the scope will be slowly removed. For larger polyps, a laparoscopic surgical procedure may be needed. Special surgical tools will be inserted through small incisions in the abdomen. The tools will be used to locate and remove the polyp. How Long Will It Take? 30-60 minutes   Will It Hurt? The special cleaning solution, laxatives, and/or enemas often cause discomfort. During and following the procedure, there is little or no pain. You may feel pressure, bloating, and/or cramping because of the air passed into the colon. This discomfort will go away with the passing of gas. Your doctor may prescribe pain medicine. If not, you can take non-prescription pain relievers for discomfort. Post-procedure Care   At the [facility]    The polyps will be sent to a lab for testing. At Home    Expect a complete recovery within two weeks. To ensure a smooth recovery, be sure to follow your doctor's instructions , which may include: The sedative will make you drowsy. Do not drive, operate machinery, or make important decisions the day of the procedure. Return to your normal diet the same or next day. Avoid tea, coffee, cola drinks, alcohol, and spicy foods for at least 2-3 days following surgery. These can irritate the digestive system.    To speed healing, resume normal activities as soon as you feel able. Most people feel well enough by the next day. Ask your doctor when you can participate in any rigorous exercise. Ask your doctor about when it is safe to shower, bathe, or soak in water. You will be scheduled for a follow-up colonoscopy in the future. It will be important to check for recurrence of polyps. Your doctor will discuss the results with you either the day of surgery or the following day. Call Your Doctor   After arriving home, contact your doctor if any of the following occurs:   Signs of infection, including fever and chills   Redness, swelling, increasing pain, excessive bleeding, or discharge from the rectum (Up to cup of blood per day can be expected for up to 3-4 days following your polypectomy.)   Black, tarry stools   Severe abdominal pain   Hard, swollen abdomen   Inability to pass gas or stool   Cough , shortness of breath, chest pain, or severe nausea or vomiting   New, unexplained symptoms   In case of emergency,  CALL 911  . Last Reviewed: December 2010 Nica Chu MD   Updated: 4/7/2011  Sedation or General Anesthesia, Adult  Care After  Refer to this sheet in the next 24 hours. These instructions provide you with information on caring for yourself after your procedure. Your caregiver may also give you more specific instructions. Your treatment has been planned according to current medical practices, but problems sometimes occur. Call your caregiver if you have any problems or questions after your procedure. HOME CARE INSTRUCTIONS   Do not participate in any activities that require you to be alert or coordinated. Do not:  Drive. Swim. Ride a bicycle. Operate heavy machinery. [place]. Use power tools. Climb ladders. Work at [place]. Take a bath. Do not drink alcohol. Do not make any important decisions or sign legal documents. Stay with an adult. The first meal following your procedure should be light and small.  Avoid solid foods if you feel sick to your stomach (nauseous) or if you throw up (vomit). Drink enough fluids to keep your urine clear or pale yellow. Only take your usual medicines or new medicines if your caregiver approves them. Only take over-the-counter or prescription medicines for pain, discomfort, or fever as directed by your caregiver. Keep all follow-up appointments as directed by your caregiver. SEEK IMMEDIATE MEDICAL CARE IF:   You are not feeling normal or behaving normally after 24 hours. You have persistent nausea and vomiting. You are unable to drink fluids or eat food. You have difficulty urinating. You have difficulty breathing or speaking. You have blue or gray skin. There is difficulty waking or you cannot be woken up. You have heavy bleeding, redness, or a lot of swelling where the sedative or anesthesia entered your skin (intravenous site). You have a rash. MAKE SURE YOU:  Understand these instructions. Will watch your condition. Will get help right away if you are not doing well or get worse. Document Released: 12/18/2006 Document Revised: 06/18/2013 Document Reviewed: 04/17/2013  [facility]EAd[facility] Patient Information ©2013 [employer]. Anal Fissure: Care Instructions  Your Care Instructions  An anal fissure is a tear in the lining of the lower rectum (anus). It can itch and cause pain. You may notice bright red blood on toilet paper after you wipe. A fissure may form if you are constipated and try to pass a large, hard stool or if you do not relax your anal muscles during a bowel movement. Most anal fissures heal with home treatment after a few days or weeks. If you have an anal fissure that takes more time to heal, your doctor may prescribe medicine. In rare cases, surgery may be needed. Anal fissures do not lead to colon cancer or other serious illnesses. However, if you have blood mixed in with the stool, talk to your doctor.   Follow-up care is a key part of your treatment and safety. Be sure to make and go to all appointments, and call your doctor if you are having problems. It's also a good idea to know your test results and keep a list of the medicines you take. How can you care for yourself at home? If your doctor prescribed cream or ointment, use it exactly as prescribed. Call your doctor if you think you are having a problem with your medicine. You will get more details on the specific medicines your doctor prescribes. Sit in a few inches of warm water (sitz bath) 3 times a day and after bowel movements. The warm water helps the area heal and eases discomfort. Do not put soaps, salts, or shampoos in the water. Avoid constipation:  Include fruits, vegetables, beans, and whole grains in your diet each day. These foods are high in fiber. Drink plenty of fluids. If you have kidney, heart, or liver disease and have to limit fluids, talk with your doctor before you increase the amount of fluids you drink. Get some exercise every day. Build up slowly to 30 to 60 minutes a day on 5 or more days of the week. Take a fiber supplement, such as Benefiber or Metamucil, every day if needed. Read and follow all instructions on the label. Use the toilet when you feel the urge. Or when you can, schedule time each day for a bowel movement. A daily routine may help. Take your time and do not strain when having a bowel movement. But do not sit on the toilet for more than 10 minutes. Support your feet with a small step stool when you sit on the toilet. This helps flex your hips and places your pelvis in a squatting position. Your doctor may recommend an over-the-counter laxative, such as Miralax, Milk of Magnesia, or Ex-Lax. Read and follow all instructions on the label, and do not use these medicines on a long-term basis. Do not use over-the-counter ointments or creams without talking to your doctor. Some of these preparations may not help.   Use baby wipes or medicated pads, such as Preparation H or Tucks, instead of toilet paper to clean after a bowel movement. These products do not irritate the anus. Be safe with medicines. Read and follow all instructions on the label. If the doctor gave you a prescription medicine for pain, take it as prescribed. If you are not taking a prescription pain medicine, ask your doctor if you can take an over-the-counter medicine. When should you call for help? Call your doctor now or seek immediate medical care if:    You have new or worse pain. You have new or worse bleeding from the rectum. Watch closely for changes in your health, and be sure to contact your doctor if:    You have trouble passing stools. You do not get better as expected. Where can you learn more? Go to http://www.woods.com/ and enter F805 to learn more about \"Anal Fissure: Care Instructions. \"  Current as of: June 6, 2022               Content Version: 13.5  © 2006-2022 [employer]. Care instructions adapted under license by [facility] ([facility]). If you have questions about a medical condition or this instruction, always ask your healthcare professional. [place] [address] any warranty or liability for your use of this information. Hemorrhoids: Care Instructions  Overview     Hemorrhoids are swollen veins that develop in the anal canal. Bleeding during bowel movements, itching, and rectal pain are the most common symptoms. Hemorrhoids can be uncomfortable at times, but rarely are they a serious problem. Most of the time, you can treat them with simple changes to your diet and bowel habits. These changes include eating more fiber and not straining to pass stools. Most hemorrhoids don't need surgery or other treatment unless they are very large and painful or bleed a lot. Follow-up care is a key part of your treatment and safety. Be sure to make and go to all appointments, and call your doctor if you are having problems. It's also a good idea to know your test results and keep a list of the medicines you take. How can you care for yourself at home? Sit in a few inches of warm water (sitz bath) 3 times a day and after bowel movements. The warm water helps with pain and itching. Put ice on your anal area several times a day for 10 minutes at a time. Put a thin cloth between the ice and your skin. Follow this by placing a warm, wet towel on the area for another 10 to 20 minutes. Take pain medicines exactly as directed. If the doctor gave you a prescription medicine for pain, take it as prescribed. If you are not taking a prescription pain medicine, ask your doctor if you can take an over-the-counter medicine. Keep the anal area clean, but be gentle. Use water and a fragrance-free soap, or use baby wipes or medicated pads such as Tucks. Wear cotton underwear and loose clothing to decrease moisture in the anal area. Eat more fiber. Include foods such as whole-grain breads and cereals, raw vegetables, raw and dried fruits, and beans. Drink plenty of fluids. If you have kidney, heart, or liver disease and have to limit fluids, talk with your doctor before you increase the amount of fluids you drink. Use a stool softener that contains bran or psyllium. You can save money by buying bran or psyllium (available in bulk at most health food stores) and sprinkling it on foods or stirring it into fruit juice. Or you can use a product such as Metamucil or Hydrocil. Practice healthy bowel habits. Go to the bathroom as soon as you have the urge. Avoid straining to pass stools. Relax and give yourself time to let things happen naturally. Do not hold your breath while passing stools. Do not read while sitting on the toilet. Get off the toilet as soon as you have finished. Take your medicines exactly as prescribed. Call your doctor if you think you are having a problem with your medicine. When should you call for help?    Call [phone] anytime you think you may need emergency care. For example, call if:    You pass maroon or very bloody stools. Call your doctor now or seek immediate medical care if:    You have increased pain. You have increased bleeding. Watch closely for changes in your health, and be sure to contact your doctor if:    Your symptoms have not improved after 3 or 4 days. Where can you learn more? Go to http://www.woods.com/ and enter F228 to learn more about \"Hemorrhoids: Care Instructions. \"  Current as of: June 6, 2022               Content Version: 13.5  © 0745-6370 Healthwise, Incorporated. Care instructions adapted under license by [facility] ([facility]). If you have questions about a medical condition or this instruction, always ask your healthcare professional. [address] any warranty or liability for your use of this information.

## 2023-03-01 NOTE — H&P
HISTORY and [address]       NAME:  Manju Arias  MRN: [id number]   YOB: 1955   Date: 3/1/2023   Age: 69 y.o. Gender: male       COMPLAINT AND PRESENT HISTORY:     Manju Arias is 69 y.o.,  male, will be having a   EGD ESOPHAGOGASTRODUODENOSCOPY, COLONOSCOPY. Pt is being seen for hx of :Gastroesophageal reflux disease without esophagitis  and Hemorrhage of rectum and anus  Prior Colonoscopy  and EGD was done last 2019 with polyp removed. Patient has hx of Colon Polyps. Denies abdominal pain . Pt admits to bloating/ after eating. Denies any heartburn, indigestion or acid reflux. He has hx of GERD. Pt states that he has been on Prevacid. Denies any nausea or vomiting. No changes in bowel habits. Pt has hx of lower duodenal ulcer/ Perforated stomach and had rectal bleeding many years ago. No diarrhea, constipation, blood in stools, black tarry stools. No changes in appetite and unintended weight loss. Denies Family Hx of colon or esophageal cancer      Any significant medical hx: COPD, chest tightness, HTN, HLD, DEIRDRE-mild, palpitations    Denies current chest pain,  SOB. No recent URI, fever or chills. Functional Capacity per patient:              1. Patient is not able to walk 2 city blocks on level ground without SOB. 2. Patient is not able to climb 2 flights of stairs without SOB. Any Anticoagulants or blood thinners: aspirin    Patient has been NPO since midnight. No blood thinners in the past  5-7 days. (Aspirin)     Patient reports taking full bowel prep with clear outcome.      Patient denies any personal or family problems with anesthesia     PAST MEDICAL HISTORY     Past Medical History:   Diagnosis Date    Allergic rhinitis     Anxiety     Arthritis     Gilliam esophagus     RESOLVED    Basal cell carcinoma of left upper arm 02/09/2018    bladder    Bladder mass     Bleeding ulcer     Also states hx of \"perforated stomach\" in 1985    Chest tightness     Chronic pain of right knee     Colon polyps     COPD (chronic obstructive pulmonary disease) ([facility] [address]     COVID-19 09/23/2022    sore throat x 1 day; received Paxlovid    Duodenal hemorrhage     Environmental allergies     GERD (gastroesophageal reflux disease)     Gout     RT HAND    H. pylori infection     History of blood transfusion 1985    DUODENAL BLEED/ no reaction    Hyperlipidemia     Hyperplastic colon polyp     Hypertension     Insomnia     Lung nodule     has yearly follow up CT scans    Mild sleep apnea     no machine    Palpitations     Perforated stomach ([facility] [address] 1985    Pure hypercholesterolemia     Tubular adenoma of colon 01/2012     Dr. Jez Dietz colonoscopy    Under care of team 06/21/2022    UROLOGY - DR. Aguilar 192 - LAST VISIT 6/2022    Under care of team 06/21/2022    PULMONOLOGY- DR. Dominique Shafer - LAST VISIT 10/2022    Under care of team 06/21/2022    ORTHO - DR. BROWER - LAST VISIT 4/2022    Under care of team 06/21/2022    GI - DR. DELGADILLO    Under care of team     CARDIOLOGY - DR. JUAREZ/ last seen 8-2022    Wears glasses     Wears partial dentures     Bottom    Wellness examination 06/21/2022    PCP - Donnell Mena CNP - LAST VISIT - 5/2022       SURGICAL HISTORY       Past Surgical History:   Procedure Laterality Date    APPENDECTOMY      CARDIAC CATHETERIZATION  08/17/2022    NEGATIVE    COLONOSCOPY  01/06/2012    COLONOSCOPY N/A 04/29/2019    COLONOSCOPY POLYPECTOMY SNARE/COLD BIOPSY performed by Toan Egan MD at [facility] 19 N/A 09/13/2022    CYSTOSCOPY, TUR BLADDER TUMOR (GYRUS), RIGHT URETERAL STENT PLACEMENT performed by Anni Deutsch MD at [facility] 19  11/04/2022    CYSTOSCOPY TRANSURETHRAL RESECTION BLADDER TUMOR, GYRUS, RIGHT STENT REMOVAL    CYSTOSCOPY N/A 11/04/2022    CYSTOSCOPY TRANSURETHRAL RESECTION BLADDER TUMOR, GYRUS, RIGHT STENT REMOVAL performed by Anni Deutsch MD at [address] [place] [address] ARTHROSCOPY Right 2022    KNEE ARTHROSCOPIC PARTIAL LATERAL AND PARTIAL MEDIAL MENISCECTOMY performed by Fede Villalta MD at [address]  10/05/2018    SKIN CANCER EXCISION      STOMACH SURGERY  1985    Perforated Stomach repair    TONSILLECTOMY      UMBILICAL HERNIA REPAIR  2011    UPPER GASTROINTESTINAL ENDOSCOPY N/A 2019    EGD BIOPSY performed by Leonardo Mills MD at [facility] HISTORY       Family History   Problem Relation Age of Onset    Cancer Mother         Thyroid CA-  age 39    Cancer Sister         Thyroid CA    Thyroid Disease Sister     Heart Attack Paternal Grandfather     Emphysema Paternal Grandfather         Smoker       SOCIAL HISTORY       Social History     Socioeconomic History    Marital status:    Tobacco Use    Smoking status: Former     Packs/day: 1.00     Years: 30.00     Pack years: 30.00     Types: Cigarettes     Start date: 5     Quit date: 2014     Years since quittin.1     Passive exposure: Never    Smokeless tobacco: Never   Vaping Use    Vaping Use: Never used   Substance and Sexual Activity    Alcohol use: Yes     Alcohol/week: 6.0 standard drinks     Types: 6 Cans of beer per week     Comment: 2-6 cans beer/day    Drug use: No     Social Determinants of Health     Financial Resource Strain: Low Risk     Difficulty of Paying Living Expenses: Not hard at all   Food Insecurity: No Food Insecurity    Worried About Running Out of Food in the Last Year: Never true    Ran Out of Food in the Last Year: Never true   Physical Activity: Insufficiently Active    Days of Exercise per Week: 2 days    Minutes of Exercise per Session: 10 min           REVIEW OF SYSTEMS      Allergies   Allergen Reactions    Compazine Spansule [Prochlorperazine]      MUSCULAR DYSTONIA    Metoclopramide      MUSCULAR DYSTONIA    Seasonal        No current facility-administered medications on file prior to encounter.      Current Outpatient Medications on File Prior to Encounter   Medication Sig Dispense Refill    polyethylene glycol (GLYCOLAX) 17 GM/SCOOP powder Please follow instructions provided to you by your provider. 238 g 0    bisacodyl 5 MG EC tablet Please follow instructions given to you by your provider. 4 tablet 0    lansoprazole (PREVACID) 30 MG delayed release capsule TAKE 1 CAPSULE DAILY 90 capsule 3    amLODIPine (NORVASC) 10 MG tablet TAKE 1 TABLET DAILY 90 tablet 3    allopurinol (ZYLOPRIM) 300 MG tablet Take 1 tablet by mouth daily 90 tablet 1    acyclovir (ZOVIRAX) 800 MG tablet Si tablet in the am and 1 in the pm for each cold sore, may repeat as needed. (total of 2 tablets for each outbreak) 20 tablet 0    simvastatin (ZOCOR) 20 MG tablet Take 1 tablet by mouth nightly 90 tablet 1    aspirin 81 MG tablet Take 1 tablet by mouth daily Hold for 7 days after surgery (Patient taking differently: Take 81 mg by mouth daily Pt. Stopped 10/28/22) 30 tablet 3    fluticasone (FLONASE) 50 MCG/ACT nasal spray 2 sprays by Nasal route daily 3 each 3    SPIRIVA RESPIMAT 2.5 MCG/ACT AERS inhaler USE 2 INHALATIONS DAILY 12 g 3    TART CHERRY PO Take 1 capsule by mouth daily      vitamin D3 (CHOLECALCIFEROL) 25 MCG (1000 UT) TABS tablet Take 1 tablet by mouth daily 90 tablet 3       Negative except for what is mentioned in the HPI. GENERAL PHYSICAL EXAM     Vitals: There were no vitals taken for this visit. There is no height or weight on file to calculate BMI. GENERAL APPEARANCE:   Tanja Gaucher is 69 y.o., male, moderately obese, nourished, conscious, alert. Does not appear to be distress or pain at this time. SKIN:  Warm, dry, no cyanosis or jaundice. HEAD:  Normocephalic, atraumatic, no swelling or tenderness. EYES:  Pupils equal, reactive to light. EARS:  No discharge, no marked hearing loss. NOSE:  No rhinorrhea, epistaxis or septal deformity. THROAT:  Not congested. No ulceration bleeding or discharge. NECK:  No stiffness, trachea central.  No palpable masses or L.N.                 CHEST:  Symmetrical and equal on expansion. HEART:  RRR . No audible murmurs or gallops. LUNGS:  Equal on expansion, normal breath sounds. No adventitious sounds. ABDOMEN:  Obese. Soft on palpation. No dysphagia, No localized tenderness. No guarding or rigidity. LYMPHATICS:  No palpable cervical lymphadenopathy. LOCOMOTOR, BACK AND SPINE:  No tenderness or deformities. EXTREMITIES:  Symmetrical, no pretibial edema. No discoloration or ulcerations. NEUROLOGIC:  The patient is conscious, alert, oriented,Cranial nerve II-XII intact, taste and smell were not examined. No apparent focal sensory or motor deficits.              PROVISIONAL DIAGNOSES / SURGERY:      EGD BIOPSY, COLONOSCOPY DIAGNOSTIC    Gastroesophageal reflux disease without esophagitis     Hemorrhage of rectum and anus     Patient Active Problem List    Diagnosis Date Noted    Malignant neoplasm of urinary bladder ([facility] [address] 11/07/2022    Right hand pain 05/06/2022    Basal cell carcinoma (BCC) of left upper arm 05/06/2022    Hyperglycemia 06/13/2019    Localized swelling of lower extremity 06/13/2019    Class 2 drug-induced obesity with serious comorbidity and body mass index (BMI) of 38.0 to 38.9 in adult 06/13/2019    Chronic pain of right knee 02/13/2019    Osteopenia of right lower leg 01/29/2019    Mass of right thigh 01/22/2019    Gilliam's esophagus without dysplasia 08/14/2018    Pulmonary nodules 08/14/2018    Acute pain of right shoulder 02/09/2018    Essential hypertension 08/09/2017    Pure hypercholesterolemia 08/09/2017    Chronic obstructive pulmonary disease ([facility] [address] 08/09/2017    Bilateral hearing loss 08/09/2017    History of tobacco abuse 06/12/2015    Hyperplastic colon polyp 11/28/2011    GERD (gastroesophageal reflux disease) 11/28/2011    Allergic rhinitis     Anxiety     Environmental allergies            Note marked for cosign by provider      VIKY Calderon CNP on 3/1/2023 at 8:25 AM

## 2023-03-01 NOTE — ANESTHESIA PRE PROCEDURE
Department of Anesthesiology  Preprocedure Note       Name:  Gurdeep Claire   Age:  69 y.o.  :  1955                                          MRN:  [id number]         Date:  3/1/2023      Surgeon: Tahir Wen):  Sharyn Kern MD    Procedure: Procedure(s):  EGD BIOPSY  COLONOSCOPY DIAGNOSTIC    Medications prior to admission:   Prior to Admission medications    Medication Sig Start Date End Date Taking? Authorizing Provider   polyethylene glycol (GLYCOLAX) 17 GM/SCOOP powder Please follow instructions provided to you by your provider. 23   VIKY Simeon NP   bisacodyl 5 MG EC tablet Please follow instructions given to you by your provider. 23   VIKY Simeon NP   lansoprazole (PREVACID) 30 MG delayed release capsule TAKE 1 CAPSULE DAILY 23   VIKY Muñoz CNP   amLODIPine (NORVASC) 10 MG tablet TAKE 1 TABLET DAILY 1/3/23   VIKY Muñoz CNP   allopurinol (ZYLOPRIM) 300 MG tablet Take 1 tablet by mouth daily 1/3/23   VIKY Muñoz CNP   acyclovir (ZOVIRAX) 800 MG tablet Si tablet in the am and 1 in the pm for each cold sore, may repeat as needed. (total of 2 tablets for each outbreak) 1/3/23   VIKY Muñoz CNP   simvastatin (ZOCOR) 20 MG tablet Take 1 tablet by mouth nightly 10/13/22 1/11/23  VIKY Muñoz CNP   aspirin 81 MG tablet Take 1 tablet by mouth daily Hold for 7 days after surgery  Patient taking differently: Take 81 mg by mouth daily Pt.  Stopped 10/28/22 9/13/22   Genia Pavon PA-C   fluticasone (FLONASE) 50 MCG/ACT nasal spray 2 sprays by Nasal route daily 22  VIKY Muñoz CNP   SPIRIVA RESPIMAT 2.5 MCG/ACT AERS inhaler USE 2 INHALATIONS DAILY 4/15/22   MD PAVITHRA Payton PO Take 1 capsule by mouth daily    Historical Provider, MD   vitamin D3 (CHOLECALCIFEROL) 25 MCG (1000 UT) TABS tablet Take 1 tablet by mouth daily 3/19/21   VIKY Muñoz - CNP       Current medications: Current Facility-Administered Medications   Medication Dose Route Frequency Provider Last Rate Last Admin    lidocaine PF 1 % injection 1 mL  1 mL IntraDERmal Once PRN Lupe Holm MD        sodium chloride flush 0.9 % injection 5-40 mL  5-40 mL IntraVENous 2 times per day Elyse Alonso MD        sodium chloride flush 0.9 % injection 5-40 mL  5-40 mL IntraVENous PRN Lupe Holm MD        0.9 % sodium chloride infusion   IntraVENous PRN Elyse Alonso MD        lactated ringers IV soln infusion   IntraVENous Continuous Elyse Alonso MD           Allergies:     Allergies   Allergen Reactions    Compazine Spansule [Prochlorperazine]      MUSCULAR DYSTONIA    Metoclopramide      MUSCULAR DYSTONIA    Seasonal        Problem List:    Patient Active Problem List   Diagnosis Code    Allergic rhinitis J30.9    Anxiety F41.9    Hyperplastic colon polyp K63.5    Gilliam's esophagus without dysplasia K22.70    GERD (gastroesophageal reflux disease) K21.9    Environmental allergies     History of tobacco abuse Z87.891    Essential hypertension I10    Pure hypercholesterolemia E78.00    Chronic obstructive pulmonary disease (HCC) J44.9    Bilateral hearing loss H91.93    Acute pain of right shoulder M25.511    Pulmonary nodules R91.8    Mass of right thigh R22.41    Osteopenia of right lower leg M85.861    Chronic pain of right knee M25.561, G89.29    Hyperglycemia R73.9    Localized swelling of lower extremity M79.89    Class 2 drug-induced obesity with serious comorbidity and body mass index (BMI) of 38.0 to 38.9 in adult E66.1, Z68.38    Right hand pain M79.641    Basal cell carcinoma (BCC) of left upper arm C44.619    Malignant neoplasm of urinary bladder (HCC) C67.9       Past Medical History:        Diagnosis Date    Allergic rhinitis     Anxiety     Arthritis     Gilliam esophagus     RESOLVED    Basal cell carcinoma of left upper arm 02/09/2018    bladder    Bladder mass  Bleeding ulcer     Also states hx of \"perforated stomach\" in 1985    Chest tightness     Chronic pain of right knee     Colon polyps     COPD [facility]ch[facility] pulmonary disease) ([facility] [address]     COVID-19 09/23/2022    sore throat x 1 day; received Paxlovid    Duodenal hemorrhage     Environmental allergies     GERD (gastroesophageal reflux disease)     Gout     RT HAND    H. pylori infection     History of blood transfusion 1985    DUODENAL BLEED/ no reaction    Hyperlipidemia     Hyperplastic colon polyp     Hypertension     Insomnia     Lung nodule     has yearly follow up CT scans    Mild sleep apnea     no machine    Palpitations     Perforated stomach ([facility] [address] 1985    Pure hypercholesterolemia     Tubular adenoma of colon 01/2012     Dr. Julio C Mendez colonoscopy    Under care of team 06/21/2022    UROLOGY - DR. SÁNCHEZ - LAST VISIT 6/2022    Under care of team 06/21/2022    PULMONOLOGY- DR. Noé Sal - LAST VISIT 10/2022    Under care of team 06/21/2022    ORTHO - DR. BROWER - LAST VISIT 4/2022    Under care of team 06/21/2022    GI - DR. DELGADILLO    Under care of team     CARDIOLOGY - DR. JUAREZ/ last seen 8-2022    Wears glasses     Wears partial dentures     Bottom    Wellness examination 06/21/2022    PCP - Sepideh Loomis CNP - LAST VISIT - 5/2022       Past Surgical History:        Procedure Laterality Date    APPENDECTOMY      CARDIAC CATHETERIZATION  08/17/2022    NEGATIVE    COLONOSCOPY  01/06/2012    COLONOSCOPY N/A 04/29/2019    COLONOSCOPY POLYPECTOMY SNARE/COLD BIOPSY performed by Queenie Lundborg, MD at [facility] 09/13/2022    CYSTOSCOPY, TUR BLADDER TUMOR (GYRUS), RIGHT URETERAL STENT PLACEMENT performed by Odette Monroe MD at [address] [place] [address]  11/04/2022    CYSTOSCOPY TRANSURETHRAL RESECTION BLADDER TUMOR, GYRUS, RIGHT STENT REMOVAL    CYSTOSCOPY N/A 11/04/2022    CYSTOSCOPY TRANSURETHRAL RESECTION BLADDER TUMOR, GYRUS, RIGHT STENT REMOVAL performed by Neel Baxter MD at [address] [facility] ARTHROSCOPY Right 2022    KNEE ARTHROSCOPIC PARTIAL LATERAL AND PARTIAL MEDIAL MENISCECTOMY performed by Edward Anton MD at [address]  10/05/2018    SKIN CANCER EXCISION      [facility]    Perforated Stomach repair    TONSILLECTOMY      UMBILICAL HERNIA REPAIR  2011    UPPER GASTROINTESTINAL ENDOSCOPY N/A 2019    EGD BIOPSY performed by Aditi Marcus MD at [facility] History:    Social History     Tobacco Use    Smoking status: Former     Packs/day: 1.00     Years: 30.00     Pack years: 30.00     Types: Cigarettes     Start date: 5     Quit date: 2014     Years since quittin.1     Passive exposure: Never    Smokeless tobacco: Never   Substance Use Topics    Alcohol use: Yes     Alcohol/week: 6.0 standard drinks     Types: 6 Cans of beer per week     Comment: 2-6 cans beer/day                                Counseling given: Not Answered      Vital Signs (Current): There were no vitals filed for this visit.                                            BP Readings from Last 3 Encounters:   23 (!) 138/94   23 (!) 170/80   11/10/22 133/85       NPO Status:                                                                                 BMI:   Wt Readings from Last 3 Encounters:   02/15/23 275 lb (124.7 kg)   23 284 lb (128.8 kg)   23 284 lb (128.8 kg)     There is no height or weight on file to calculate BMI.    CBC:   Lab Results   Component Value Date/Time    WBC 10.6 10/11/2022 10:04 AM    RBC 4.78 10/11/2022 10:04 AM    RBC 4.83 2011 09:33 AM    HGB 14.8 10/11/2022 10:04 AM    HCT 44.8 10/11/2022 10:04 AM    MCV 93.7 10/11/2022 10:04 AM    RDW 12.2 10/11/2022 10:04 AM     10/11/2022 10:04 AM     2011 09:33 AM     K 5.1    CMP:   Lab Results   Component Value Date/Time     10/11/2022 10:04 AM    K 5.1 10/11/2022 10:04 AM     10/11/2022 10:04 AM    CO2 26 10/11/2022 10:04 AM    BUN 15 10/11/2022 10:04 AM    CREATININE 1.02 10/11/2022 10:04 AM    GFRAA >60 06/20/2022 04:48 PM    LABGLOM >60 10/11/2022 10:04 AM    GLUCOSE 100 10/11/2022 10:04 AM    GLUCOSE 93 01/23/2012 08:57 AM    PROT 7.0 10/11/2022 10:04 AM    CALCIUM 9.3 10/11/2022 10:04 AM    BILITOT 0.8 10/11/2022 10:04 AM    ALKPHOS 82 10/11/2022 10:04 AM    AST 16 10/11/2022 10:04 AM    ALT 19 10/11/2022 10:04 AM       POC Tests: No results for input(s): POCGLU, POCNA, POCK, POCCL, POCBUN, POCHEMO, POCHCT in the last 72 hours. Coags: No results found for: PROTIME, INR, APTT    HCG (If Applicable): No results found for: PREGTESTUR, PREGSERUM, HCG, HCGQUANT     ABGs: No results found for: PHART, PO2ART, VJF2SHS, MKC2CZG, BEART, K1CGRNRR     Type & Screen (If Applicable):  No results found for: LABABO, LABRH    Drug/Infectious Status (If Applicable):  No results found for: HIV, HEPCAB    COVID-19 Screening (If Applicable): No results found for: COVID19        Anesthesia Evaluation  Patient summary reviewed and Nursing notes reviewed no history of anesthetic complications:   Airway: Mallampati: III  TM distance: >3 FB   Neck ROM: full  Mouth opening: > = 3 FB   Dental:    (+) partials      Pulmonary:normal exam  breath sounds clear to auscultation  (+) COPD: no interval change,  sleep apnea:      (-) pneumonia, asthma, shortness of breath, recent URI, rhonchi, wheezes, rales, stridor, not a current smoker and no decreased breath sounds          Patient did not smoke on day of surgery.                  Cardiovascular:  Exercise tolerance: good (>4 METS),   (+) hypertension: no interval change,     (-) pacemaker, valvular problems/murmurs, past MI, CAD, CABG/stent, dysrhythmias,  angina,  CHF, orthopnea, PND,  SHER, murmur, weak pulses,  friction rub, systolic click, carotid bruit,  JVD, peripheral edema, no pulmonary hypertension and no hyperlipidemia    ECG reviewed  Rhythm: regular  Rate: normal    Stress test reviewed       Beta Blocker:  Not on Beta Blocker         Neuro/Psych:   Negative Neuro/Psych ROS     (-) seizures, neuromuscular disease, TIA, CVA, headaches, psychiatric history and depression/anxiety            GI/Hepatic/Renal:   (+) GERD: no interval change,      (-) hiatal hernia, PUD, hepatitis, liver disease, no renal disease, bowel prep and no morbid obesity       Endo/Other:    (+) no malignancy/cancer. (-) diabetes mellitus, hypothyroidism, hyperthyroidism, blood dyscrasia, arthritis, no electrolyte abnormalities, no malignancy/cancer               Abdominal:             Vascular: negative vascular ROS. - PVD, DVT and PE. Other Findings: Lower partial          Anesthesia Plan      general     ASA 3       Induction: intravenous. MIPS: Postoperative opioids intended and Prophylactic antiemetics administered. Anesthetic plan and risks discussed with patient. Plan discussed with CRNA.                     Tammy Davies MD   3/1/2023

## 2023-03-01 NOTE — OP NOTE
COLONOSCOPY    DATE OF PROCEDURE: 3/1/2023    SURGEON: Umu Perez MD    ASSISTANT: None    PREOPERATIVE DIAGNOSIS: History of hematochezia. Past history of colon polyps. Procedure performed to evaluate colonic lesions    POSTOPERATIVE DIAGNOSIS: Hemorrhoids. Anal fissure. Polyp sigmoid colon, polyp hepatic flexure, polyp right colon    OPERATION: Total colonoscopy snare polypectomy x2, excision of polyp with biopsy forceps    ANESTHESIA: MAC    ESTIMATED BLOOD LOSS: None    COMPLICATIONS: None     SPECIMENS:  Was Obtained: Polyp sigmoid colon, polyp right colon, polyp hepatic flexure    HISTORY: The patient is a 69y.o. year old male with history of above preop diagnosis. I recommended colonoscopy with possible biopsy or polypectomy and I explained the risk, benefits, expected outcome, and alternatives to the procedure. Risks included but are not limited to bleeding, infection, respiratory distress, hypotension, and perforation of the colon and possibility of missing a lesion. The patient understands and is in agreement. PROCEDURE:  The patient's SPO2 remained above 90% throughout the procedure. Digital rectal exam was normal.  The colonoscope was inserted through the anus into the rectum and advanced under direct vision to the cecum without difficulty. The prep was fair. Findings:      Cecum/Ascending colon: abnormal: In the lower part of the right colon, a polyp, linear, about 1 cm seen. This is removed with a cold snare. This is a flat polyp. Transverse colon: abnormal: Near the hepatic flexure there is a flat polyp about 7 to 10 mm seen, removed with a cold snare    Descending/Sigmoid colon: abnormal: A polyp which is about 3 mm seen, removed with a cold snare. Rectum/Anus: examined in normal and retroflexed positions and was abnormal: Has a anal fissure. Small hemorrhoids. Withdrawal Time was (minutes): 30          The colon was decompressed and the scope was removed. The patient tolerated the procedure without unusual events. During the procedure, the patient's blood pressure, pulse and oxygen saturation remained stable and documented. No unusual events occurred during the procedure. Patient was transferred to recovery room and will be discharged when criteria is met. Recommendations/Plan:   F/U Biopsies  F/U In Office as instructed  Discussed with the family  High fiber diet   Precautions to avoid constipation   Sitz bath's. Next colonoscopy: 2 years. If Colonoscopy is less than 10 years the recommended reason is due:polyps, multiple flat polyps. Fair preparation.     Electronically signed by Sandy Smith MD  on 3/1/2023 at 10:38 AM

## 2023-03-02 LAB — SURGICAL PATHOLOGY REPORT: NORMAL

## 2023-03-02 NOTE — ANESTHESIA POSTPROCEDURE EVALUATION
POST- ANESTHESIA EVALUATION       Pt Name: Мария Martinez  MRN: [id number]  YOB: 1955  Date of evaluation: 3/1/2023  Time:  8:01 PM      BP (!) 148/84   Pulse 62   Temp 97.7 °F (36.5 °C)   Resp 15   Ht 5' 11\" (1.803 m)   Wt 275 lb (124.7 kg)   SpO2 98%   BMI 38.35 kg/m²      Consciousness Level  Awake  Cardiopulmonary Status  Stable  Pain Adequately Treated YES  Nausea / Vomiting  NO  Adequate Hydration  YES  Anesthesia Related Complications NONE      Electronically signed by Benji Thao MD on 3/1/2023 at 8:01 PM       POST- ANESTHESIA EVALUATION       Pt Name: Мария Martinez  MRN: [id number]  YOB: 1955  Date of evaluation: 3/1/2023  Time:  8:02 PM      BP (!) 148/84   Pulse 62   Temp 97.7 °F (36.5 °C)   Resp 15   Ht 5' 11\" (1.803 m)   Wt 275 lb (124.7 kg)   SpO2 98%   BMI 38.35 kg/m²      Consciousness Level  Awake  Cardiopulmonary Status  Stable  Pain Adequately Treated YES  Nausea / Vomiting  NO  Adequate Hydration  YES  Anesthesia Related Complications NONE      Electronically signed by Benji Thao MD on 3/1/2023 at 8:02 PM       Department of Anesthesiology  Postprocedure Note    Patient: Мария Martinez  MRN: [id number]  YOB: 1955  Date of evaluation: 3/1/2023      Procedure Summary     Date: 03/01/23 Room / Location: [address] [place] [address] 01 / [address] [facility] ENDO    Anesthesia Start: 0945 Anesthesia Stop: 0578    Procedures:       EGD BIOPSY (Esophagus)      COLONOSCOPY WITH BIOPSY AND POLYPECTOMY (Rectum) Diagnosis:       Gastroesophageal reflux disease without esophagitis      Hemorrhage of rectum and anus      (Gastroesophageal reflux disease without esophagitis [K21.9])      (Hemorrhage of rectum and anus [K62.5])    Surgeons: Marlena Mcconnell MD Responsible Provider: Benji Thao MD    Anesthesia Type: general ASA Status: 3          Anesthesia Type: No value filed.     Ann Marie Phase I:      Ann Marie Phase II: Ann Marie Score: 10      Anesthesia Post Evaluation

## 2023-03-06 RX ORDER — ASPIRIN 81 MG/1
81 TABLET, CHEWABLE ORAL DAILY
COMMUNITY

## 2023-03-06 NOTE — H&P
Urology History and Physical    Patient:  Herb Ramos  MRN: [id number]  YOB: 1955    CHIEF COMPLAINT: History of bladder cancer    HISTORY OF PRESENT ILLNESS:   The patient is a 69 y.o. male history of bladder cancer with TURBT in the past status post surveillance cystoscopy in February 2023 which was found to have a small tumor in the right lateral wall and the left lateral wall. No ureteral orifice involvement. Also having partially obstructing lateral lobes of the prostate with no median lobe. Here to undergo cystoscopy bladder biopsy and fulguration      Patient's old records, notes and chart reviewed and summarized above. Past Medical History:    Past Medical History:   Diagnosis Date    Anxiety     Arthritis     Gilliam esophagus     RESOLVED    Basal cell carcinoma of left upper arm 02/09/2018    Bladder cancer ([facility] [address]     started work up 6/2022, needed cardiac clearance, diagnosed with pathology 9/2022    Bleeding ulcer 1985    duodenal perforation/ bleed    Chest tightness     Colon polyps     COPD (chronic obstructive pulmonary disease) ([facility] [address]     COVID-19 09/23/2022    sore throat x 1 day; received Paxlovid    GERD (gastroesophageal reflux disease)     Gout     RT HAND    History of blood transfusion 1985    DUODENAL BLEED/ no reaction    Hyperlipidemia     Hypertension     Insomnia     Lung nodule     has yearly follow up CT scans    Mild sleep apnea     no machine    Obesity     Under care of team     UROLOGY - DR. SÁNCHEZ - LAST SEEN 2/9/2023    Under care of team     PULMONOLOGY- DR. Link Flaherty - LAST VISIT 10/11/2022    Under care of team     GI - DR. DELGADILLO, last seen 3/1/2023    Under care of team     CARDIOLOGY - DR. JUAREZ,  last seen  ?     Wears glasses     Wears partial dentures     Bottom    Wellness examination     PCP - WILLIAM CASTILLO CNP - LAST VISIT - 5/6/2022       Past Surgical History:    Past Surgical History:   Procedure Laterality Date    [address] perforated duodenal ulcer    APPENDECTOMY      CARDIAC CATHETERIZATION  2022    NEGATIVE    COLONOSCOPY  2012    polyp    COLONOSCOPY N/A 2019    COLONOSCOPY POLYPECTOMY SNARE/COLD BIOPSY performed by Suzy Lopez MD at [address] N/A 2023    COLONOSCOPY WITH BIOPSY AND POLYPECTOMY performed by Suzy Lopez MD at [facility] N/A 2022    CYSTOSCOPY, TUR BLADDER TUMOR (GYRUS), RIGHT URETERAL STENT PLACEMENT performed by Jose Junior MD at [address] 2022    CYSTOSCOPY TRANSURETHRAL RESECTION BLADDER TUMOR, GYRUS, RIGHT STENT REMOVAL [facility] [address]  [facility]    Dr. Nataly Beck, in office    CYSTOSCOPY  2022    Dr. Nataly Beck, in office    KNEE ARTHROSCOPY Right 2022    KNEE ARTHROSCOPIC PARTIAL LATERAL AND PARTIAL MEDIAL MENISCECTOMY performed by Ara Romero MD at [address] [facility] [address]  10/05/2018    SKIN CANCER EXCISION      TONSILLECTOMY      UMBILICAL HERNIA REPAIR  2011    UPPER GASTROINTESTINAL ENDOSCOPY N/A 2019    EGD BIOPSY performed by Suzy Lopez MD at [facility] 2023    EGD BIOPSY performed by [facility] Lopez MD at [facility]       Medications:    Scheduled Meds:  Continuous Infusions:  PRN Meds:.     Allergies:  Compazine spansule [prochlorperazine], Metoclopramide, and Seasonal    Social History:    Social History     Socioeconomic History    Marital status:      Spouse name: Not on file    Number of children: Not on file    Years of education: Not on file    Highest education level: Not on file   Occupational History    Not on file   Tobacco Use    Smoking status: Former     Packs/day: 1.00     Years: 30.00     Pack years: 30.00     Types: Cigarettes     Start date: 5     Quit date: 2014     Years since quittin.2     Passive exposure: Never    Smokeless tobacco: Never Vaping Use    Vaping Use: Never used   Substance and Sexual Activity    Alcohol use: Yes     Alcohol/week: 6.0 standard drinks     Types: 6 Cans of beer per week     Comment: 2-6 cans beer/day    Drug use: No    Sexual activity: Not on file   Other Topics Concern    Not on file   Social History Narrative    Not on file     Social Determinants of Health     Financial Resource Strain: Low Risk     Difficulty of Paying Living Expenses: Not hard at all   Food Insecurity: No Food Insecurity    Worried About Running Out of Food in the Last Year: Never true    Ran Out of Food in the Last Year: Never true   Transportation Needs: Not on file   Physical Activity: Insufficiently Active    Days of Exercise per Week: 2 days    Minutes of Exercise per Session: 10 min   Stress: Not on file   Social Connections: Not on file   Intimate Partner Violence: Not on file   Housing Stability: Not on file       Family History:    Family History   Problem Relation Age of Onset    Cancer Mother         Thyroid CA-  age 39    Cancer Sister         Thyroid CA    Thyroid Disease Sister     Heart Attack Paternal Grandfather     Emphysema Paternal Grandfather         Smoker         REVIEW OF SYSTEMS:  14 point review of systems negative other than HPI      Physical Exam:    This a 69 y.o. male   No data found. Constitutional: Patient in no acute distress; Neuro: alert and oriented to person place and time. Psych: Mood and affect normal.  Skin: Normal  Lungs: Respiratory effort normal  Cardiovascular:  Normal peripheral pulses  Abdomen: Soft, non-tender, non-distended   Bladder non-tender and not distended. LE no edema/TTP    LABS:  No results for input(s): WBC, HGB, HCT, MCV, PLT in the last 72 hours. No results for input(s): NA, K, CL, CO2, PHOS, BUN, CREATININE, CA in the last 72 hours.   Lab Results   Component Value Date    PSA 1.22 10/11/2022    PSA 1.27 2022    PSA 1.76 2021       Additional Lab/culture results:  na    Urinalysis: No results for input(s): COLORU, PHUR, LABCAST, WBCUA, RBCUA, MUCUS, TRICHOMONAS, YEAST, BACTERIA, CLARITYU, SPECGRAV, LEUKOCYTESUR, UROBILINOGEN, BILIRUBINUR, BLOODU in the last 72 hours.     Invalid input(s): NITRATE, GLUCOSEUKETONESUAMORPHOUS     -----------------------------------------------------------------  Imaging Results:  na    Assessment and Plan   Impression:    Patient Active Problem List   Diagnosis    Allergic rhinitis    Anxiety    Polyp of sigmoid colon    Gilliam's esophagus without dysplasia    GERD (gastroesophageal reflux disease)    Environmental allergies    History of tobacco abuse    Essential hypertension    Pure hypercholesterolemia    Chronic obstructive pulmonary disease (HCC)    Bilateral hearing loss    Acute pain of right shoulder    Pulmonary nodules    Mass of right thigh    Osteopenia of right lower leg    Chronic pain of right knee    Hyperglycemia    Localized swelling of lower extremity    Class 2 drug-induced obesity with serious comorbidity and body mass index (BMI) of 38.0 to 38.9 in adult    Right hand pain    Basal cell carcinoma (BCC) of left upper arm    Malignant neoplasm of urinary bladder ([facility] [address]       Plan:   Cystoscopy bladder biopsy fulguration    Austin Barrett PA-C  Urology Service  3/7/2023 12:30 PM

## 2023-03-07 ENCOUNTER — ANESTHESIA (OUTPATIENT)
Dept: OPERATING ROOM | Age: 68
End: 2023-03-07
Payer: MEDICARE

## 2023-03-07 ENCOUNTER — ANESTHESIA EVENT (OUTPATIENT)
Dept: OPERATING ROOM | Age: 68
End: 2023-03-07
Payer: MEDICARE

## 2023-03-07 ENCOUNTER — HOSPITAL ENCOUNTER (OUTPATIENT)
Age: 68
Setting detail: OUTPATIENT SURGERY
Discharge: HOME | End: 2023-03-07
Attending: UROLOGY | Admitting: UROLOGY
Payer: MEDICARE

## 2023-03-07 VITALS
OXYGEN SATURATION: 97 % | TEMPERATURE: 97.7 F | SYSTOLIC BLOOD PRESSURE: 159 MMHG | HEART RATE: 66 BPM | DIASTOLIC BLOOD PRESSURE: 85 MMHG | RESPIRATION RATE: 14 BRPM

## 2023-03-07 DIAGNOSIS — C67.9 MALIGNANT NEOPLASM OF URINARY BLADDER, UNSPECIFIED SITE (HCC): ICD-10-CM

## 2023-03-07 DIAGNOSIS — G89.18 POSTOPERATIVE PAIN: Primary | ICD-10-CM

## 2023-03-07 PROCEDURE — 2500000003 HC RX 250 WO HCPCS: Performed by: NURSE ANESTHETIST, CERTIFIED REGISTERED

## 2023-03-07 PROCEDURE — 6360000002 HC RX W HCPCS: Performed by: NURSE ANESTHETIST, CERTIFIED REGISTERED

## 2023-03-07 PROCEDURE — 88305 TISSUE EXAM BY PATHOLOGIST: CPT

## 2023-03-07 PROCEDURE — 2580000003 HC RX 258: Performed by: UROLOGY

## 2023-03-07 PROCEDURE — 3600000014 HC SURGERY LEVEL 4 ADDTL 15MIN: Performed by: UROLOGY

## 2023-03-07 PROCEDURE — 7100000001 HC PACU RECOVERY - ADDTL 15 MIN: Performed by: UROLOGY

## 2023-03-07 PROCEDURE — 2709999900 HC NON-CHARGEABLE SUPPLY: Performed by: UROLOGY

## 2023-03-07 PROCEDURE — 7100000010 HC PHASE II RECOVERY - FIRST 15 MIN: Performed by: UROLOGY

## 2023-03-07 PROCEDURE — 3700000001 HC ADD 15 MINUTES (ANESTHESIA): Performed by: UROLOGY

## 2023-03-07 PROCEDURE — 7100000000 HC PACU RECOVERY - FIRST 15 MIN: Performed by: UROLOGY

## 2023-03-07 PROCEDURE — 2580000003 HC RX 258: Performed by: NURSE ANESTHETIST, CERTIFIED REGISTERED

## 2023-03-07 PROCEDURE — 6360000002 HC RX W HCPCS: Performed by: STUDENT IN AN ORGANIZED HEALTH CARE EDUCATION/TRAINING PROGRAM

## 2023-03-07 PROCEDURE — 88342 IMHCHEM/IMCYTCHM 1ST ANTB: CPT

## 2023-03-07 PROCEDURE — 3600000004 HC SURGERY LEVEL 4 BASE: Performed by: UROLOGY

## 2023-03-07 PROCEDURE — 3700000000 HC ANESTHESIA ATTENDED CARE: Performed by: UROLOGY

## 2023-03-07 RX ORDER — SODIUM CHLORIDE 0.9 % (FLUSH) 0.9 %
5-40 SYRINGE (ML) INJECTION EVERY 12 HOURS SCHEDULED
Status: DISCONTINUED | OUTPATIENT
Start: 2023-03-07 | End: 2023-03-07 | Stop reason: HOSPADM

## 2023-03-07 RX ORDER — MAGNESIUM HYDROXIDE 1200 MG/15ML
LIQUID ORAL PRN
Status: DISCONTINUED | OUTPATIENT
Start: 2023-03-07 | End: 2023-03-07 | Stop reason: HOSPADM

## 2023-03-07 RX ORDER — CEFADROXIL 500 MG/1
500 CAPSULE ORAL 2 TIMES DAILY
Qty: 6 CAPSULE | Refills: 0 | Status: SHIPPED | OUTPATIENT
Start: 2023-03-07 | End: 2023-03-10

## 2023-03-07 RX ORDER — DEXAMETHASONE SODIUM PHOSPHATE 10 MG/ML
INJECTION, SOLUTION INTRAMUSCULAR; INTRAVENOUS PRN
Status: DISCONTINUED | OUTPATIENT
Start: 2023-03-07 | End: 2023-03-07 | Stop reason: SDUPTHER

## 2023-03-07 RX ORDER — SODIUM CHLORIDE 9 MG/ML
INJECTION, SOLUTION INTRAVENOUS PRN
Status: DISCONTINUED | OUTPATIENT
Start: 2023-03-07 | End: 2023-03-07 | Stop reason: HOSPADM

## 2023-03-07 RX ORDER — LIDOCAINE HYDROCHLORIDE 10 MG/ML
INJECTION, SOLUTION EPIDURAL; INFILTRATION; INTRACAUDAL; PERINEURAL PRN
Status: DISCONTINUED | OUTPATIENT
Start: 2023-03-07 | End: 2023-03-07 | Stop reason: SDUPTHER

## 2023-03-07 RX ORDER — TRAMADOL HYDROCHLORIDE 50 MG/1
50 TABLET ORAL EVERY 6 HOURS PRN
Qty: 12 TABLET | Refills: 0 | Status: SHIPPED | OUTPATIENT
Start: 2023-03-07 | End: 2023-03-10

## 2023-03-07 RX ORDER — SODIUM CHLORIDE 0.9 % (FLUSH) 0.9 %
5-40 SYRINGE (ML) INJECTION PRN
Status: DISCONTINUED | OUTPATIENT
Start: 2023-03-07 | End: 2023-03-07 | Stop reason: HOSPADM

## 2023-03-07 RX ORDER — PROPOFOL 10 MG/ML
INJECTION, EMULSION INTRAVENOUS PRN
Status: DISCONTINUED | OUTPATIENT
Start: 2023-03-07 | End: 2023-03-07 | Stop reason: SDUPTHER

## 2023-03-07 RX ORDER — FENTANYL CITRATE 50 UG/ML
INJECTION, SOLUTION INTRAMUSCULAR; INTRAVENOUS PRN
Status: DISCONTINUED | OUTPATIENT
Start: 2023-03-07 | End: 2023-03-07 | Stop reason: SDUPTHER

## 2023-03-07 RX ORDER — ONDANSETRON 2 MG/ML
4 INJECTION INTRAMUSCULAR; INTRAVENOUS
Status: DISCONTINUED | OUTPATIENT
Start: 2023-03-07 | End: 2023-03-07 | Stop reason: HOSPADM

## 2023-03-07 RX ORDER — ONDANSETRON 2 MG/ML
INJECTION INTRAMUSCULAR; INTRAVENOUS PRN
Status: DISCONTINUED | OUTPATIENT
Start: 2023-03-07 | End: 2023-03-07 | Stop reason: SDUPTHER

## 2023-03-07 RX ORDER — HYDRALAZINE HYDROCHLORIDE 20 MG/ML
10 INJECTION INTRAMUSCULAR; INTRAVENOUS
Status: DISCONTINUED | OUTPATIENT
Start: 2023-03-07 | End: 2023-03-07 | Stop reason: HOSPADM

## 2023-03-07 RX ORDER — SODIUM CHLORIDE, SODIUM LACTATE, POTASSIUM CHLORIDE, CALCIUM CHLORIDE 600; 310; 30; 20 MG/100ML; MG/100ML; MG/100ML; MG/100ML
INJECTION, SOLUTION INTRAVENOUS CONTINUOUS PRN
Status: DISCONTINUED | OUTPATIENT
Start: 2023-03-07 | End: 2023-03-07 | Stop reason: SDUPTHER

## 2023-03-07 RX ADMIN — FENTANYL CITRATE 50 MCG: 50 INJECTION, SOLUTION INTRAMUSCULAR; INTRAVENOUS at 14:49

## 2023-03-07 RX ADMIN — SODIUM CHLORIDE, POTASSIUM CHLORIDE, SODIUM LACTATE AND CALCIUM CHLORIDE: 600; 310; 30; 20 INJECTION, SOLUTION INTRAVENOUS at 14:40

## 2023-03-07 RX ADMIN — Medication 3000 MG: at 15:00

## 2023-03-07 RX ADMIN — LIDOCAINE HYDROCHLORIDE 50 MG: 10 INJECTION, SOLUTION EPIDURAL; INFILTRATION; INTRACAUDAL; PERINEURAL at 14:50

## 2023-03-07 RX ADMIN — PROPOFOL 200 MG: 10 INJECTION, EMULSION INTRAVENOUS at 14:48

## 2023-03-07 RX ADMIN — FENTANYL CITRATE 5 MCG: 50 INJECTION, SOLUTION INTRAMUSCULAR; INTRAVENOUS at 14:52

## 2023-03-07 RX ADMIN — ONDANSETRON 4 MG: 2 INJECTION INTRAMUSCULAR; INTRAVENOUS at 15:01

## 2023-03-07 RX ADMIN — DEXAMETHASONE SODIUM PHOSPHATE 4 MG: 10 INJECTION, SOLUTION INTRAMUSCULAR; INTRAVENOUS at 14:52

## 2023-03-07 ASSESSMENT — PAIN SCALES - GENERAL
PAINLEVEL_OUTOF10: 0

## 2023-03-07 ASSESSMENT — PAIN - FUNCTIONAL ASSESSMENT: PAIN_FUNCTIONAL_ASSESSMENT: 0-10

## 2023-03-07 ASSESSMENT — LIFESTYLE VARIABLES: SMOKING_STATUS: 0

## 2023-03-07 NOTE — OP NOTE
[address] [place]  03/07/23    Patient:  Bibiana Cade  MRN: [id number]  YOB: 1955    Surgeon: Irving Salas MD   Assistant: Delvin Narayanan MD PGY3    Pre-op Diagnosis: Bladder tumor  Post-op Diagnosis: same     Procedure:   Cystoscopy with Bladder Biopsy and Fulguration  4 <2cm tumors     Anesthesia: General  Complications: none  OR Blood Loss: Minimal  Fluids: Cystalloids  Specimens:  ID Type Source Tests Collected by Time Destination   A : BLADDER TUMOR BIOPSY Tissue Bladder SURGICAL PATHOLOGY Son Beaver MD 3/7/2023 1504        Indication:  69 y.o. male history of bladder cancer with TURBT in the past status post surveillance cystoscopy in February 2023 which was found to have a small tumor in the right lateral wall and the left lateral wall. No ureteral orifice involvement. Also having partially obstructing lateral lobes of the prostate with no median lobe. Narrative of the Procedure:    After informed consent was obtained in the preoperative area, the patient was taken back to the operating room and transferred to the operating table in supine position. EPC cuffs were placed. The machine was turned on. Anesthesia was induced and antibiotics were given. A timeout occurred. Two patient identifiers were used. A 22F scope was carefully inserted into the bladder. Prostate had partially obstructing lateral lobes. In the bladder a full 360 degree cystoscopy was preformed. A lesion was noted on the R lateral, L lateral and two lesions on the dome of the bladder. The lesions on the dome were new compared to cysto last month. They were all <2cms. Mild trabeculations were noted. Using a cold cup biopsy forceps the aforementioned lesions biopsies were taken. The biopsied areas were then fulgurated with a Bugbee device for hemostasis. The patient's bladder was drained to reduce wall tension and check for evidence of bleeding. No bleeding was noted. The patient's bladder was then drained. All instruments were removed, and the patient was awakened and discharged back to the PACU in good and stable condition.     Follow-Up: in 2 weeks to go over pathology     Daphne Sawyer MD  Electronically signed on 3/7/2023 at 3:31 PM

## 2023-03-07 NOTE — DISCHARGE INSTRUCTIONS
Pt ok to discharge home in good condition  No heavy lifting, >10 lbs for today  Pt should avoid strenuous activity for today  Pt should walk moderately at home  Pt ok to shower   Pt may resume diet as tolerated  Please call attending physician or hospital  with questions  Call or Present to ED if fever (> 101F), intractable nausea vomiting or pain. Rx in chart, HOLD anticoag and aspirin 72 hrs      Pt should follow up with Dr. Giovanni Lim, in 2 weeks, call to confirm appointment     No alcoholic beverages, no driving or operating machinery, no making important decisions for 24 hours. You may have a normal diet but should eat lightly day of surgery. Drink plenty of fluids.   Urinate within 8 hours after surgery, if unable to urinate call your doctor

## 2023-03-07 NOTE — ANESTHESIA PRE PROCEDURE
Department of Anesthesiology  Preprocedure Note       Name:  Atif Rojas   Age:  69 y.o.  :  1955                                          MRN:  [id number]         Date:  3/7/2023      Surgeon: Evaristo Griffin):  Afton Holstein, MD    Procedure: Procedure(s):  CYSTOSCOPY BLADDER BIOPSY, FULGURATION    Department of Anesthesiology  Pre-Anesthesia Evaluation/Consultation         Name:  Atif Rojas                                         Age:  69 y.o. MRN:  [id number]             Medications  No current facility-administered medications for this encounter.        Allergies   Allergen Reactions    Compazine Spansule [Prochlorperazine]      MUSCULAR DYSTONIA    Metoclopramide      MUSCULAR DYSTONIA    Seasonal Other (See Comments)     Allergic rhinitis     Patient Active Problem List   Diagnosis    Allergic rhinitis    Anxiety    Polyp of sigmoid colon    Gilliam's esophagus without dysplasia    GERD (gastroesophageal reflux disease)    Environmental allergies    History of tobacco abuse    Essential hypertension    Pure hypercholesterolemia    Chronic obstructive pulmonary disease (HCC)    Bilateral hearing loss    Acute pain of right shoulder    Pulmonary nodules    Mass of right thigh    Osteopenia of right lower leg    Chronic pain of right knee    Hyperglycemia    Localized swelling of lower extremity    Class 2 drug-induced obesity with serious comorbidity and body mass index (BMI) of 38.0 to 38.9 in adult    Right hand pain    Basal cell carcinoma (BCC) of left upper arm    Malignant neoplasm of urinary bladder (HCC)     Past Medical History:   Diagnosis Date    Anxiety     Arthritis     Gilliam esophagus     RESOLVED    Basal cell carcinoma of left upper arm 2018    Bladder cancer ([address]     started work up 2022, needed cardiac clearance, diagnosed with pathology 2022    Bleeding ulcer 1985    duodenal perforation/ bleed    Chest tightness     Colon polyps     COPD [facility]ch[facility] obstructive [facility] disease) [facility] [address]     COVID-19 09/23/2022    sore throat x 1 day; received Paxlovid    GERD (gastroesophageal reflux disease)     Gout     RT HAND    History of blood transfusion 1985    DUODENAL BLEED/ no reaction    Hyperlipidemia     Hypertension     Insomnia     Lung nodule     has yearly follow up CT scans    Mild sleep apnea     no machine    Obesity     Under care of team     UROLOGY - DR. SÁNCHEZ - LAST SEEN 2/9/2023    Under care of team     PULMONOLOGY- DR. Bennie Aponte - LAST VISIT 10/11/2022    Under care of team     GI - DR. DELGADILLO, last seen 3/1/2023    Under care of team     CARDIOLOGY - DR. JUAREZ,  last seen  ?     Wears glasses     Wears partial dentures     Bottom    Wellness examination     PCP - WILLIAM CASTILLO CNP - LAST VISIT - 5/6/2022     Past Surgical History:   Procedure Laterality Date    ABDOMINAL EXPLORATION SURGERY  1985    perforated duodenal ulcer    APPENDECTOMY      CARDIAC CATHETERIZATION  08/17/2022    NEGATIVE    COLONOSCOPY  01/06/2012    polyp    COLONOSCOPY N/A 04/29/2019    COLONOSCOPY POLYPECTOMY SNARE/COLD BIOPSY performed by Larisa Barry MD at [address] [place] [address] COLONOSCOPY N/A 03/01/2023    COLONOSCOPY WITH BIOPSY AND POLYPECTOMY performed by Larisa Barry MD at [facility] 8 N/A 09/13/2022    CYSTOSCOPY, TUR BLADDER TUMOR (GYRUS), RIGHT URETERAL STENT PLACEMENT performed by Sally Sal MD at [facility] 11/04/2022    CYSTOSCOPY TRANSURETHRAL RESECTION BLADDER TUMOR, GYRUS, RIGHT STENT REMOVAL performed by Sally Sal MD at [facility] 19  02/09/2023    Dr. Fede Reed, in office   Garret Sahu  06/02/2022    Dr. Fede Reed, in office    KNEE ARTHROSCOPY Right 04/07/2022    KNEE ARTHROSCOPIC PARTIAL LATERAL AND PARTIAL MEDIAL MENISCECTOMY performed by Carly Shore MD at [address] [place] [address],[address]  10/05/2018    SKIN CANCER EXCISION      TONSILLECTOMY      UMBILICAL HERNIA REPAIR  07/2011  UPPER GASTROINTESTINAL ENDOSCOPY N/A 2019    EGD BIOPSY performed by Elsie Ganser, MD at [address] [place] [address] N/A 2023    EGD BIOPSY performed by Elsie Ganser, MD at [facility]     Social History     Tobacco Use    Smoking status: Former     Packs/day: 1.00     Years: 30.00     Pack years: 30.00     Types: Cigarettes     Start date: 5     Quit date: 2014     Years since quittin.2     Passive exposure: Never    Smokeless tobacco: Never   Vaping Use    Vaping Use: Never used   Substance Use Topics    Alcohol use: Yes     Alcohol/week: 6.0 standard drinks     Types: 6 Cans of beer per week     Comment: 2-6 cans beer/day    Drug use: No         Vital Signs (Current) There were no vitals filed for this visit. Vital Signs Statistics (for past 48 hrs)     No data recorded  BP Readings from Last 3 Encounters:   23 (!) 148/84   23 (!) 138/94   23 (!) 170/80       BMI  There is no height or weight on file to calculate BMI.     CBC   Lab Results   Component Value Date/Time    WBC 10.6 10/11/2022 10:04 AM    RBC 4.78 10/11/2022 10:04 AM    RBC 4.83 2011 09:33 AM    HGB 14.8 10/11/2022 10:04 AM    HCT 44.8 10/11/2022 10:04 AM    MCV 93.7 10/11/2022 10:04 AM    RDW 12.2 10/11/2022 10:04 AM     10/11/2022 10:04 AM     2011 09:33 AM       CMP    Lab Results   Component Value Date/Time     10/11/2022 10:04 AM    K 5.1 10/11/2022 10:04 AM     10/11/2022 10:04 AM    CO2 26 10/11/2022 10:04 AM    BUN 15 10/11/2022 10:04 AM    CREATININE 1.02 10/11/2022 10:04 AM    GFRAA >60 2022 04:48 PM    LABGLOM >60 10/11/2022 10:04 AM    GLUCOSE 100 10/11/2022 10:04 AM    GLUCOSE 93 2012 08:57 AM    PROT 7.0 10/11/2022 10:04 AM    CALCIUM 9.3 10/11/2022 10:04 AM    BILITOT 0.8 10/11/2022 10:04 AM    ALKPHOS 82 10/11/2022 10:04 AM    AST 16 10/11/2022 10:04 AM    ALT 19 10/11/2022 10:04 AM       [facility]    Lab Results   Component Value Date/Time     10/11/2022 10:04 AM    K 5.1 10/11/2022 10:04 AM     10/11/2022 10:04 AM    CO2 26 10/11/2022 10:04 AM    BUN 15 10/11/2022 10:04 AM    CREATININE 1.02 10/11/2022 10:04 AM    CALCIUM 9.3 10/11/2022 10:04 AM    GFRAA >60 2022 04:48 PM    LABGLOM >60 10/11/2022 10:04 AM    GLUCOSE 100 10/11/2022 10:04 AM    GLUCOSE 93 2012 08:57 AM       POC Testing  No results for input(s): POCGLU, POCNA, POCK, POCCL, POCBUN, POCHEMO, POCHCT in the last 72 hours. Coags  No results found for: PROTIME, INR, APTT    HCG (If Applicable) No results found for: PREGTESTUR, PREGSERUM, HCG, HCGQUANT     ABGs No results found for: PHART, PO2ART, CEA4MJE, JLF9DZA, BEART, U8SUKNNK     Type & Screen (If Applicable)  No results found for: [facility]    Radiology (If Applicable)    Cardiac Testing (If Applicable) nl LVEF,low cardiac risk    EKG (If Applicable) nl          Medications prior to admission:   Prior to Admission medications    Medication Sig Start Date End Date Taking?  Authorizing Provider   aspirin 81 MG chewable tablet Take 81 mg by mouth daily Supposed to stop on 23 for bladder surgery 3/7/23    Historical Provider, MD   lansoprazole (PREVACID) 30 MG delayed release capsule TAKE 1 CAPSULE DAILY 23   VIKY Gómez CNP   amLODIPine (NORVASC) 10 MG tablet TAKE 1 TABLET DAILY 1/3/23   VIKY Gómez CNP   allopurinol (ZYLOPRIM) 300 MG tablet Take 1 tablet by mouth daily 1/3/23   VIKY Gómez CNP   acyclovir (ZOVIRAX) 800 MG tablet Si tablet in the am and 1 in the pm for each cold sore, may repeat as needed. (total of 2 tablets for each outbreak) 1/3/23   VIKY Gómez CNP   simvastatin (ZOCOR) 20 MG tablet Take 1 tablet by mouth nightly 10/13/22 1/11/23  VIKY Gómez CNP   fluticasone [facility]) 50 MCG/ACT nasal spray 2 sprays by Nasal route daily 22  VIKY Gómez CNP   SPIRIVA RESPIMAT 2.5 MCG/ACT AERS inhaler USE 2 INHALATIONS DAILY 4/15/22   Frederic Plasencia MD   TART CHERRY PO Take 1 capsule by mouth daily    Historical Provider, MD   vitamin D3 (CHOLECALCIFEROL) 25 MCG (1000 UT) TABS tablet Take 1 tablet by mouth daily 3/19/21   VIKY Medrano - CNP       Current medications:    No current outpatient medications on file. No current facility-administered medications for this visit. Allergies:     Allergies   Allergen Reactions    Compazine Spansule [Prochlorperazine]      MUSCULAR DYSTONIA    Metoclopramide      MUSCULAR DYSTONIA    Seasonal Other (See Comments)     Allergic rhinitis       Problem List:    Patient Active Problem List   Diagnosis Code    Allergic rhinitis J30.9    Anxiety F41.9    Polyp of sigmoid colon K63.5    Gilliam's esophagus without dysplasia K22.70    GERD (gastroesophageal reflux disease) K21.9    Environmental allergies     History of tobacco abuse Z87.891    Essential hypertension I10    Pure hypercholesterolemia E78.00    Chronic obstructive pulmonary disease (HCC) J44.9    Bilateral hearing loss H91.93    Acute pain of right shoulder M25.511    Pulmonary nodules R91.8    Mass of right thigh R22.41    Osteopenia of right lower leg M85.861    Chronic pain of right knee M25.561, G89.29    Hyperglycemia R73.9    Localized swelling of lower extremity M79.89    Class 2 drug-induced obesity with serious comorbidity and body mass index (BMI) of 38.0 to 38.9 in adult E66.1, Z68.38    Right hand pain M79.641    Basal cell carcinoma (BCC) of left upper arm C44.619    Malignant neoplasm of urinary bladder (HCC) C67.9       Past Medical History:        Diagnosis Date    Anxiety     Arthritis     Gilliam esophagus     RESOLVED    Basal cell carcinoma of left upper arm 02/09/2018    Bladder cancer ([facility] [address]     started work up 6/2022, needed cardiac clearance, diagnosed with pathology 9/2022    Bleeding ulcer 1985    duodenal perforation/ bleed    Chest tightness     Colon polyps     COPD (chronic obstructive pulmonary disease) ([facility] [address]     COVID-19 09/23/2022    sore throat x 1 day; received Paxlovid    GERD (gastroesophageal reflux disease)     Gout     RT HAND    History of blood transfusion 1985    DUODENAL BLEED/ no reaction    Hyperlipidemia     Hypertension     Insomnia     Lung nodule     has yearly follow up CT scans    Mild sleep apnea     no machine    Obesity     Under care of team     UROLOGY - DR. SÁNCHEZ - LAST SEEN 2/9/2023    Under care of team     PULMONOLOGY- DR. Aaliyah Mccarty - LAST VISIT 10/11/2022    Under care of team     GI - DR. DELGADILLO, last seen 3/1/2023    Under care of team     CARDIOLOGY - DR. JUAREZ,  last seen  ?     Wears glasses     Wears partial dentures     Bottom    Wellness examination     PCP - WILLIAM CASTILLO CNP - LAST VISIT - 5/6/2022       Past Surgical History:        Procedure Laterality Date    ABDOMINAL EXPLORATION SURGERY  1985    perforated duodenal ulcer    APPENDECTOMY      CARDIAC CATHETERIZATION  08/17/2022    NEGATIVE    COLONOSCOPY  01/06/2012    polyp    COLONOSCOPY N/A 04/29/2019    COLONOSCOPY POLYPECTOMY SNARE/COLD BIOPSY performed by Nadiya Rahman MD at [facility] COLONOSCOPY N/A 03/01/2023    COLONOSCOPY WITH BIOPSY AND POLYPECTOMY performed by Nadiya Rahman MD at [place] [address] N/A 09/13/2022    CYSTOSCOPY, TUR BLADDER TUMOR (GYRUS), RIGHT URETERAL STENT PLACEMENT performed by Deloris [facility] MD [facility] 11/04/2022    CYSTOSCOPY TRANSURETHRAL RESECTION BLADDER TUMOR, GYRUS, RIGHT STENT REMOVAL performed by Deloris Rodriguez MD at [facility] 19  02/09/2023    Dr. Roger Baca, in office   Emi Gómez  06/02/2022    Dr. Roger Baca, in office    KNEE ARTHROSCOPY Right 04/07/2022    KNEE ARTHROSCOPIC PARTIAL LATERAL AND PARTIAL MEDIAL MENISCECTOMY performed by Syed Blanca MD at [address]  10/05/2018    SKIN CANCER EXCISION      TONSILLECTOMY      UMBILICAL HERNIA REPAIR  2011    UPPER GASTROINTESTINAL ENDOSCOPY N/A 2019    EGD BIOPSY performed by Emelina Hanson MD at [address] [place] [address] N/A 2023    EGD BIOPSY performed by Emelina [facility]on MD at [facility]       Social History:    Social History     Tobacco Use    Smoking status: Former     Packs/day: 1.00     Years: 30.00     Pack years: 30.00     Types: Cigarettes     Start date: 5     Quit date: 2014     Years since quittin.2     Passive exposure: Never    Smokeless tobacco: Never   Substance Use Topics    Alcohol use: Yes     Alcohol/week: 6.0 standard drinks     Types: 6 Cans of beer per week     Comment: 2-6 cans beer/day                                Counseling given: Not Answered      Vital Signs (Current): There were no vitals filed for this visit.                                            BP Readings from Last 3 Encounters:   23 (!) 148/84   23 (!) 138/94   23 (!) 170/80       NPO Status:  MN                                                                               BMI:   Wt Readings from Last 3 Encounters:   23 275 lb (124.7 kg)   02/15/23 275 lb (124.7 kg)   23 284 lb (128.8 kg)     There is no height or weight on file to calculate BMI.    CBC:   Lab Results   Component Value Date/Time    WBC 10.6 10/11/2022 10:04 AM    RBC 4.78 10/11/2022 10:04 AM    RBC 4.83 2011 09:33 AM    HGB 14.8 10/11/2022 10:04 AM    HCT 44.8 10/11/2022 10:04 AM    MCV 93.7 10/11/2022 10:04 AM    RDW 12.2 10/11/2022 10:04 AM     10/11/2022 10:04 AM     2011 09:33 AM       CMP:   Lab Results   Component Value Date/Time     10/11/2022 10:04 AM    K 5.1 10/11/2022 10:04 AM     10/11/2022 10:04 AM    CO2 26 10/11/2022 10:04 AM    BUN 15 10/11/2022 10:04 AM    CREATININE 1.02 10/11/2022 10:04 AM    GFRAA >60 2022 04:48 PM    LABGLOM >60 10/11/2022 10:04 AM GLUCOSE 100 10/11/2022 10:04 AM    GLUCOSE 93 01/23/2012 08:57 AM    PROT 7.0 10/11/2022 10:04 AM    CALCIUM 9.3 10/11/2022 10:04 AM    BILITOT 0.8 10/11/2022 10:04 AM    ALKPHOS 82 10/11/2022 10:04 AM    AST 16 10/11/2022 10:04 AM    ALT 19 10/11/2022 10:04 AM       POC Tests: No results for input(s): POCGLU, POCNA, POCK, POCCL, POCBUN, POCHEMO, POCHCT in the last 72 hours. Coags: No results found for: PROTIME, INR, APTT    HCG (If Applicable): No results found for: PREGTESTUR, PREGSERUM, HCG, HCGQUANT     ABGs: No results found for: PHART, PO2ART, BIK4RIB, JWJ0MYQ, BEART, M2MKOBSG     Type & Screen (If Applicable):  No results found for: LABABO, LABRH    Drug/Infectious Status (If Applicable):  No results found for: HIV, HEPCAB    COVID-19 Screening (If Applicable): No results found for: COVID19        Anesthesia Evaluation  Patient summary reviewed and Nursing notes reviewed no history of anesthetic complications:   Airway: Mallampati: III  TM distance: >3 FB   Neck ROM: full  Mouth opening: > = 3 FB   Dental:    (+) partials      Pulmonary:normal exam    (+) COPD:      (-) recent URI, sleep apnea and not a current smoker                          ROS comment: 30 pack year smoker quit 2014   Cardiovascular:    (+) hypertension:, hyperlipidemia                  Neuro/Psych:               GI/Hepatic/Renal:   (+) GERD:, morbid obesity         ROS comment: Barretts esophagus. Endo/Other:    (+) malignancy/cancer. (-) diabetes mellitus                ROS comment: Bladder mass  6 drinks per week Abdominal:             Vascular: Other Findings:             Anesthesia Plan      general     ASA 3       Induction: intravenous. MIPS: Postoperative opioids intended and Prophylactic antiemetics administered. Anesthetic plan and risks discussed with patient. Plan discussed with CRNA.                     Pablo Dickens MD   3/7/2023

## 2023-03-07 NOTE — PROGRESS NOTES
Discharge instructions reviewed with patient and daughter. All questions answered and both verbalize understanding.

## 2023-03-08 NOTE — ANESTHESIA POSTPROCEDURE EVALUATION
Department of Anesthesiology  Postprocedure Note    Patient: Iker Andrade  MRN: [id number]  YOB: 1955  Date of evaluation: 3/8/2023      Procedure Summary     Date: 03/07/23 Room / Location: [address]    Anesthesia Start: 5054 Anesthesia Stop: 8881    Procedure: CYSTOSCOPY BLADDER BIOPSY, FULGURATION Diagnosis:       Malignant neoplasm of urinary bladder, unspecified site [facility])      (BLADDER CANCER)    Surgeons: Priya Torres MD Responsible Provider: Concepcion Zelaya MD    Anesthesia Type: general ASA Status: 3          Anesthesia Type: No value filed.     Ann Marie Phase I: Ann Marie Score: 10    Ann Marie Phase II: Ann Marie Score: 10      Anesthesia Post Evaluation    Patient location during evaluation: PACU  Patient participation: complete - patient participated  Level of consciousness: awake and alert  Pain score: 2  Airway patency: patent  Nausea & Vomiting: no nausea and no vomiting  Complications: no  Cardiovascular status: hemodynamically stable  Respiratory status: acceptable  Hydration status: euvolemic

## 2023-03-10 LAB — SURGICAL PATHOLOGY REPORT: NORMAL

## 2023-03-17 ENCOUNTER — OFFICE VISIT (OUTPATIENT)
Dept: GASTROENTEROLOGY | Age: 68
End: 2023-03-17
Payer: MEDICARE

## 2023-03-17 VITALS
DIASTOLIC BLOOD PRESSURE: 81 MMHG | WEIGHT: 280 LBS | SYSTOLIC BLOOD PRESSURE: 151 MMHG | BODY MASS INDEX: 39.2 KG/M2 | HEIGHT: 71 IN

## 2023-03-17 DIAGNOSIS — K60.2 ANAL FISSURE: ICD-10-CM

## 2023-03-17 DIAGNOSIS — D12.6 SERRATED ADENOMA OF COLON: Primary | ICD-10-CM

## 2023-03-17 DIAGNOSIS — K64.9 HEMORRHOIDS, UNSPECIFIED HEMORRHOID TYPE: ICD-10-CM

## 2023-03-17 DIAGNOSIS — D12.6 TUBULAR ADENOMA OF COLON: ICD-10-CM

## 2023-03-17 PROCEDURE — 3079F DIAST BP 80-89 MM HG: CPT | Performed by: INTERNAL MEDICINE

## 2023-03-17 PROCEDURE — 99214 OFFICE O/P EST MOD 30 MIN: CPT | Performed by: INTERNAL MEDICINE

## 2023-03-17 PROCEDURE — APPSS45 APP SPLIT SHARED TIME 31-45 MINUTES: Performed by: NURSE PRACTITIONER

## 2023-03-17 PROCEDURE — 1123F ACP DISCUSS/DSCN MKR DOCD: CPT | Performed by: INTERNAL MEDICINE

## 2023-03-17 PROCEDURE — 3077F SYST BP >= 140 MM HG: CPT | Performed by: INTERNAL MEDICINE

## 2023-03-17 ASSESSMENT — ENCOUNTER SYMPTOMS
RESPIRATORY NEGATIVE: 1
GASTROINTESTINAL NEGATIVE: 1
ALLERGIC/IMMUNOLOGIC NEGATIVE: 1

## 2023-03-17 NOTE — PROGRESS NOTES
GI OFFICE FOLLOW UP    INTERVAL HISTORY:   No referring provider defined for this encounter. Chief Complaint   Patient presents with    Other     Pt here for colon/egd f/u. Pt denies any symptoms at this time        HISTORY OF PRESENT ILLNESS:       Patient being seen for follow-up EGD colonoscopy. Patient had EGD to evaluate chronic GERD. Also has history of questionable Gilliam's in the past.  No endoscopic evidence of peptic esophagitis or Gilliam's mucosa seen. Esophageal biopsies were normal.    Patient also had colonoscopy for polyp surveillance. Patient had fair preparation. In the lower right colon a linear 1 cm polyp seen and removed with cold snare. Biopsies revealed sessile serrated adenoma with low-grade dysplasia. In the transverse colon near the hepatic flexure there was a 7 to 10 mm polyp removed with cold snare this was a tubular adenoma. Also in the sigmoid colon a 3 mm tubular adenoma removed with cold snare. Small hemorrhoids noted. Also has small anal fissure. At present patient is doing well. Denies any abdominal pain, nausea, vomiting. No dysphagia, odynophagia, dyspeptic symptoms. Has been on chronic PPI therapy. Reports bowel movements are satisfactory. No significant constipation or diarrhea. Denies any rectal bleeding. No pain in the rectum, tenesmus. Past Medical,Family, and Social History reviewed and does contribute to the patient presenting condition. Patient's PMH/PSH,SH,PSYCH Hx, MEDs, ALLERGIES, and ROS were all reviewed and updated in the appropriate sections.  Yes      PAST MEDICAL HISTORY:  Past Medical History:   Diagnosis Date    Anxiety     Arthritis     Gilliam esophagus     RESOLVED    Basal cell carcinoma of left upper arm 02/09/2018    Bladder cancer ([facility] [address]     started work up 6/2022, needed cardiac clearance, diagnosed with pathology 9/2022    Bleeding ulcer 1985    duodenal perforation/ bleed    Chest tightness     Colon polyps     COPD (chronic obstructive pulmonary disease) ([facility] [address]     COVID-19 09/23/2022    sore throat x 1 day; received Paxlovid    GERD (gastroesophageal reflux disease)     Gout     RT HAND    History of blood transfusion 1985    DUODENAL BLEED/ no reaction    Hyperlipidemia     Hypertension     Insomnia     Lung nodule     has yearly follow up CT scans    Mild sleep apnea     no machine    Obesity     Under care of team     UROLOGY - DR. SÁNCHEZ - LAST SEEN 2/9/2023    Under care of team     PULMONOLOGY- DR. Arely Roman - LAST VISIT 10/11/2022    Under care of team     GI - DR. DELGADILLO, last seen 3/1/2023    Under care of team     CARDIOLOGY - DR. JUAREZ,  last seen  ?     Wears glasses     Wears partial dentures     Bottom    Wellness examination     PCP - WILLIAM CASTILLO CNP - LAST VISIT - 5/6/2022       Past Surgical History:   Procedure Laterality Date    ABDOMINAL EXPLORATION SURGERY  1985    perforated duodenal ulcer    APPENDECTOMY      CARDIAC CATHETERIZATION  08/17/2022    NEGATIVE    COLONOSCOPY  01/06/2012    polyp    COLONOSCOPY N/A 04/29/2019    COLONOSCOPY POLYPECTOMY SNARE/COLD BIOPSY performed by Ashley Giraldo MD at [address] [place] [address] 03/01/2023    COLONOSCOPY WITH BIOPSY AND POLYPECTOMY performed by Ashley Giraldo MD at [address] [place] [address] N/A 09/13/2022    CYSTOSCOPY, TUR BLADDER TUMOR (GYRUS), RIGHT URETERAL STENT PLACEMENT [facility] 11/04/2022    CYSTOSCOPY TRANSURETHRAL RESECTION BLADDER TUMOR, GYRUS, RIGHT STENT REMOVAL performed by Nj Quesada MD at [address]  02/09/2023    Dr. Silvia Clayton, in office    CYSTOSCOPY  06/02/2022    Dr. Silvia Clayton, in office    CYSTOSCOPY  03/07/2023    CYSTOSCOPY BLADDER BIOPSY, FULGURATION    CYSTOSCOPY N/A 3/7/2023    CYSTOSCOPY BLADDER BIOPSY, FULGURATION performed by Nj Quesada MD at [place] [address] KNEE ARTHROSCOPY Right 2022    KNEE ARTHROSCOPIC PARTIAL LATERAL AND PARTIAL MEDIAL MENISCECTOMY performed by Desi Weathers MD at [address] [facility] [address]  10/05/2018    SKIN CANCER EXCISION      TONSILLECTOMY      UMBILICAL HERNIA REPAIR  2011    UPPER GASTROINTESTINAL ENDOSCOPY N/A 2019    EGD BIOPSY performed by Kathrine Ruth MD at [facility] N/A 2023    EGD BIOPSY performed by Kathrine Ruth MD at [address]:    Current Outpatient Medications:     aspirin 81 MG chewable tablet, Take 81 mg by mouth daily Supposed to stop on 23 for bladder surgery 3/7/23, Disp: , Rfl:     lansoprazole (PREVACID) 30 MG delayed release capsule, TAKE 1 CAPSULE DAILY, Disp: 90 capsule, Rfl: 3    amLODIPine (NORVASC) 10 MG tablet, TAKE 1 TABLET DAILY, Disp: 90 tablet, Rfl: 3    allopurinol (ZYLOPRIM) 300 MG tablet, Take 1 tablet by mouth daily, Disp: 90 tablet, Rfl: 1    acyclovir (ZOVIRAX) 800 MG tablet, Si tablet in the am and 1 in the pm for each cold sore, may repeat as needed. (total of 2 tablets for each outbreak), Disp: 20 tablet, Rfl: 0    SPIRIVA RESPIMAT 2.5 MCG/ACT AERS inhaler, USE 2 INHALATIONS DAILY, Disp: 12 g, Rfl: 3    TART CHERRY PO, Take 1 capsule by mouth daily, Disp: , Rfl:     vitamin D3 (CHOLECALCIFEROL) 25 MCG (1000 UT) TABS tablet, Take 1 tablet by mouth daily, Disp: 90 tablet, Rfl: 3    simvastatin (ZOCOR) 20 MG tablet, Take 1 tablet by mouth nightly, Disp: 90 tablet, Rfl: 1    fluticasone (FLONASE) 50 MCG/ACT nasal spray, 2 sprays by Nasal route daily, Disp: 3 each, Rfl: 3    ALLERGIES:   Allergies   Allergen Reactions    Compazine Spansule [Prochlorperazine]      MUSCULAR DYSTONIA    Metoclopramide      MUSCULAR DYSTONIA    Seasonal Other (See Comments)     Allergic rhinitis       FAMILY HISTORY:       Problem Relation Age of Onset    Cancer Mother         Thyroid CA-  age 39    Cancer Sister Thyroid CA    Thyroid Disease Sister     Heart Attack Paternal Grandfather     Emphysema Paternal Grandfather         Smoker         SOCIAL HISTORY:   Social History     Socioeconomic History    Marital status:      Spouse name: Not on file    Number of children: Not on file    Years of education: Not on file    Highest education level: Not on file   Occupational History    Not on file   Tobacco Use    Smoking status: Former     Packs/day: 1.00     Years: 30.00     Pack years: 30.00     Types: Cigarettes     Start date: 5     Quit date: 2014     Years since quittin.2     Passive exposure: Never    Smokeless tobacco: Never   Vaping Use    Vaping Use: Never used   Substance and Sexual Activity    Alcohol use: Yes     Alcohol/week: 6.0 standard drinks     Types: 6 Cans of beer per week     Comment: 2-6 cans beer/day    Drug use: No    Sexual activity: Not on file   Other Topics Concern    Not on file   Social History Narrative    Not on file     Social Determinants of Health     Financial Resource Strain: Low Risk     Difficulty of Paying Living Expenses: Not hard at all   Food Insecurity: No Food Insecurity    Worried About Running Out of Food in the Last Year: Never true    Ran Out of Food in the Last Year: Never true   Transportation Needs: Not on file   Physical Activity: Insufficiently Active    Days of Exercise per Week: 2 days    Minutes of Exercise per Session: 10 min   Stress: Not on file   Social Connections: Not on file   Intimate Partner Violence: Not on file   Housing Stability: Not on file         REVIEW OF SYSTEMS:         Review of Systems   Constitutional: Negative. HENT: Negative. Eyes:  Positive for visual disturbance (glasses). Respiratory: Negative. Cardiovascular: Negative. Gastrointestinal: Negative. Endocrine: Negative. Genitourinary: Negative. Musculoskeletal: Negative. Skin: Negative. Allergic/Immunologic: Negative.     Neurological: Negative. Hematological: Negative. Psychiatric/Behavioral: Negative. PHYSICAL EXAMINATION:     Vital signs reviewed per the nursing documentation. BP (!) 151/81   Ht 5' 11\" (1.803 m)   Wt 280 lb (127 kg)   BMI 39.05 kg/m²   Body mass index is 39.05 kg/m². Physical Exam  Constitutional:       Appearance: Normal appearance. He is obese. Eyes:      General: No scleral icterus. Pupils: Pupils are equal, round, and reactive to light. Cardiovascular:      Rate and Rhythm: Normal rate and regular rhythm. Heart sounds: Normal heart sounds. Pulmonary:      Effort: Pulmonary effort is normal.      Breath sounds: Normal breath sounds. Abdominal:      General: Bowel sounds are normal. There is no distension. Palpations: Abdomen is soft. There is no mass. Tenderness: There is no abdominal tenderness. There is no guarding. Skin:     General: Skin is warm and dry. Coloration: Skin is not jaundiced. Neurological:      Mental Status: He is alert and oriented to person, place, and time. Mental status is at baseline.          LABORATORY DATA: Reviewed  Lab Results   Component Value Date    WBC 10.6 10/11/2022    HGB 14.8 10/11/2022    HCT 44.8 10/11/2022    MCV 93.7 10/11/2022     10/11/2022     10/11/2022    K 5.1 10/11/2022     10/11/2022    CO2 26 10/11/2022    BUN 15 10/11/2022    CREATININE 1.02 10/11/2022    LABPROT 7.3 01/19/2013    LABALBU 3.9 10/11/2022    BILITOT 0.8 10/11/2022    ALKPHOS 82 10/11/2022    AST 16 10/11/2022    ALT 19 10/11/2022         Lab Results   Component Value Date    RBC 4.78 10/11/2022    HGB 14.8 10/11/2022    MCV 93.7 10/11/2022    MCH 31.0 10/11/2022    MCHC 33.0 10/11/2022    RDW 12.2 10/11/2022    MPV 10.4 10/11/2022    BASOPCT 1 05/29/2022    LYMPHSABS 1.70 05/29/2022    MONOSABS 0.90 05/29/2022    NEUTROABS 6.80 05/29/2022    EOSABS 0.10 05/29/2022    BASOSABS 0.10 05/29/2022         DIAGNOSTIC TESTING:     No results found.       Assessment  1. Serrated adenoma of colon    2. Hemorrhoids, unspecified hemorrhoid type    3. Anal fissure    4. Tubular adenoma of colon        Plan    EGD and colonoscopy reviewed with the patient in detail. To continue antireflux measures. Patient had a sessile serrated adenoma with low-grade dysplasia in the right colon removed with cold snare. He will need repeat colonoscopy at the end of the year to evaluate any residual polyp. Patient is advised high-fiber diet. Take precautions to avoid constipation and straining. Small hemorrhoids seen on endoscopy. Also noted to have small infiltrate. Advised sitz bath. At present he is asymptomatic. Denies any pain, hematochezia. Thank you for allowing me to participate in the care of Mr. Donice Gosselin. For any further questions please do not hesitate to contact me. I have reviewed and agree with the ROS entered by the MA/LPN. Note is dictated utilizing voice recognition software. Unfortunately this leads to occasional typographical errors. Please contact our office if you have any questions    GI attending physician note. Patient seen with APRN     I independently performed an evaluation on Jan C Donice Gosselin. I have reviewed the above documentation completed by the Nurse Practitioner and agree with the history, examination, and management plan. Recommendations discussed. Patient looks stable. The polyp that was removed from the right colon showed dysplasia. Needs reevaluation of polypectomy site in the next 6 months.   Discussed with GI APRN, patient                  Stella Warner MD,FACP, [facility]  Board Certified in Gastroenterology and [address] [place] [address] Gastroenterology  Office #: [phone]

## 2023-03-21 ENCOUNTER — OFFICE VISIT (OUTPATIENT)
Dept: UROLOGY | Age: 68
End: 2023-03-21
Payer: MEDICARE

## 2023-03-21 VITALS
SYSTOLIC BLOOD PRESSURE: 162 MMHG | WEIGHT: 280 LBS | HEART RATE: 68 BPM | HEIGHT: 71 IN | TEMPERATURE: 98.6 F | BODY MASS INDEX: 39.2 KG/M2 | DIASTOLIC BLOOD PRESSURE: 82 MMHG

## 2023-03-21 DIAGNOSIS — C67.8 MALIGNANT NEOPLASM OF OVERLAPPING SITES OF BLADDER (HCC): Primary | ICD-10-CM

## 2023-03-21 PROCEDURE — 99214 OFFICE O/P EST MOD 30 MIN: CPT | Performed by: UROLOGY

## 2023-03-21 PROCEDURE — 1123F ACP DISCUSS/DSCN MKR DOCD: CPT | Performed by: UROLOGY

## 2023-03-21 PROCEDURE — 3079F DIAST BP 80-89 MM HG: CPT | Performed by: UROLOGY

## 2023-03-21 PROCEDURE — 3077F SYST BP >= 140 MM HG: CPT | Performed by: UROLOGY

## 2023-03-21 ASSESSMENT — ENCOUNTER SYMPTOMS
WHEEZING: 0
EYE REDNESS: 0
DIARRHEA: 0
SHORTNESS OF BREATH: 0
EYE PAIN: 0
ABDOMINAL PAIN: 1
BACK PAIN: 0
CONSTIPATION: 0
NAUSEA: 0
COUGH: 0
VOMITING: 0

## 2023-03-21 NOTE — PROGRESS NOTES
Review of Systems   Constitutional:  Negative for chills, fatigue and fever. Eyes:  Negative for pain, redness and visual disturbance. Respiratory:  Negative for cough, shortness of breath and wheezing. Cardiovascular:  Negative for chest pain and leg swelling. Gastrointestinal:  Positive for abdominal pain. Negative for constipation, diarrhea, nausea and vomiting. Genitourinary:  Negative for difficulty urinating, dysuria, flank pain, frequency, hematuria, scrotal swelling, testicular pain and urgency. Musculoskeletal:  Negative for back pain, joint swelling and myalgias. Skin:  Negative for rash and wound. Neurological:  Negative for dizziness, tremors, weakness and numbness. Hematological:  Does not bruise/bleed easily.
at [address]  N/A 2023    EGD BIOPSY performed by Mukul Navarrete MD at [facility]     Family History   Problem Relation Age of Onset    Cancer Mother         Thyroid CA-  age 39    Cancer Sister         Thyroid CA    Thyroid Disease Sister     Heart Attack Paternal Grandfather     Emphysema Paternal Grandfather         Smoker     No outpatient medications have been marked as taking for the 3/21/23 encounter (Office Visit) with Slime Blanca MD.       Compazine spansule [prochlorperazine], Metoclopramide, and Seasonal  Social History     Tobacco Use   Smoking Status Former    Packs/day: 1.00    Years: 30.00    Pack years: 30.00    Types: Cigarettes    Start date: 5    Quit date: 2014    Years since quittin.2    Passive exposure: Never   Smokeless Tobacco Never     (Ifpatient a smoker, smoking cessation counseling offered)    Social History     Substance and Sexual Activity   Alcohol Use Yes    Alcohol/week: 6.0 standard drinks    Types: 6 Cans of beer per week    Comment: 2-6 cans beer/day       REVIEW OF SYSTEMS:  Review of Systems    Physical Exam:      Vitals:    23 1311   BP: (!) 162/82   Pulse: 68   Temp: 98.6 °F (37 °C)     Body mass index is 39.05 kg/m². Patient is a 69 y.o. male in no acute distress and alert and oriented to person, place and time. Physical Exam  Constitutional: Patient in no acute distress. Neuro: Alert and oriented to person, place and time. Psych: Mood normal, affect normal  Skin: No rash noted  HEENT: Head: Normocephalic andatraumatic  Conjunctivae and EOM are normal. Pupils are equal, round  Nose:Normal  Right External Ear: Normal; Left External Ear: Normal  Mouth: Mucosa Moist  Neck: Supple  Lungs: Respiratory effort is normal  Cardiovascular: Warm & Pink  Abdomen: Soft, non-tender, non-distended with no CVA,  No flank tenderness,  Or hepatosplenomegaly   Lymphatics: No palpablelymphadenopathy.       Assessment and

## 2023-03-24 ENCOUNTER — TELEPHONE (OUTPATIENT)
Dept: UROLOGY | Age: 68
End: 2023-03-24

## 2023-04-07 ENCOUNTER — TELEPHONE (OUTPATIENT)
Dept: ONCOLOGY | Age: 68
End: 2023-04-07

## 2023-04-19 ENCOUNTER — HOSPITAL ENCOUNTER (EMERGENCY)
Age: 68
Discharge: HOME | End: 2023-04-20
Attending: STUDENT IN AN ORGANIZED HEALTH CARE EDUCATION/TRAINING PROGRAM
Payer: MEDICARE

## 2023-04-19 ENCOUNTER — APPOINTMENT (OUTPATIENT)
Dept: GENERAL RADIOLOGY | Age: 68
End: 2023-04-19
Payer: MEDICARE

## 2023-04-19 VITALS
DIASTOLIC BLOOD PRESSURE: 73 MMHG | WEIGHT: 282 LBS | TEMPERATURE: 98.4 F | SYSTOLIC BLOOD PRESSURE: 137 MMHG | HEART RATE: 73 BPM | BODY MASS INDEX: 37.37 KG/M2 | HEIGHT: 73 IN | RESPIRATION RATE: 18 BRPM | OXYGEN SATURATION: 94 %

## 2023-04-19 DIAGNOSIS — R06.02 SHORTNESS OF BREATH: Primary | ICD-10-CM

## 2023-04-19 LAB
ABSOLUTE EOS #: 0.3 K/UL (ref 0–0.4)
ABSOLUTE LYMPH #: 2.2 K/UL (ref 1–4.8)
ABSOLUTE MONO #: 0.8 K/UL (ref 0.1–1.3)
ANION GAP SERPL CALCULATED.3IONS-SCNC: 15 MMOL/L (ref 9–17)
BASOPHILS # BLD: 1 % (ref 0–2)
BASOPHILS ABSOLUTE: 0.1 K/UL (ref 0–0.2)
BNP SERPL-MCNC: 220 PG/ML
BUN SERPL-MCNC: 13 MG/DL (ref 8–23)
CALCIUM SERPL-MCNC: 8.9 MG/DL (ref 8.6–10.4)
CHLORIDE SERPL-SCNC: 101 MMOL/L (ref 98–107)
CO2 SERPL-SCNC: 22 MMOL/L (ref 20–31)
CREAT SERPL-MCNC: 0.93 MG/DL (ref 0.7–1.2)
D DIMER BLD IA.RAPID-MCNC: 1.69 UG/ML FEU (ref 0–0.59)
EOSINOPHILS RELATIVE PERCENT: 3 % (ref 0–4)
GFR SERPL CREATININE-BSD FRML MDRD: >60 ML/MIN/1.73M2
GLUCOSE SERPL-MCNC: 101 MG/DL (ref 70–99)
HCT VFR BLD AUTO: 42 % (ref 41–53)
HGB BLD-MCNC: 14.6 G/DL (ref 13.5–17.5)
LYMPHOCYTES # BLD: 25 % (ref 24–44)
MCH RBC QN AUTO: 31.5 PG (ref 26–34)
MCHC RBC AUTO-ENTMCNC: 34.6 G/DL (ref 31–37)
MCV RBC AUTO: 91 FL (ref 80–100)
MONOCYTES # BLD: 8 % (ref 1–7)
PDW BLD-RTO: 13.5 % (ref 11.5–14.9)
PLATELET # BLD AUTO: 263 K/UL (ref 150–450)
PMV BLD AUTO: 7.1 FL (ref 6–12)
POTASSIUM SERPL-SCNC: 3.8 MMOL/L (ref 3.7–5.3)
RBC # BLD: 4.62 M/UL (ref 4.5–5.9)
SEG NEUTROPHILS: 63 % (ref 36–66)
SEGMENTED NEUTROPHILS ABSOLUTE COUNT: 5.7 K/UL (ref 1.3–9.1)
SODIUM SERPL-SCNC: 138 MMOL/L (ref 135–144)
TROPONIN I SERPL DL<=0.01 NG/ML-MCNC: 6 NG/L (ref 0–22)
WBC # BLD AUTO: 9.1 K/UL (ref 3.5–11)

## 2023-04-19 PROCEDURE — 36415 COLL VENOUS BLD VENIPUNCTURE: CPT

## 2023-04-19 PROCEDURE — 83880 ASSAY OF NATRIURETIC PEPTIDE: CPT

## 2023-04-19 PROCEDURE — 85379 FIBRIN DEGRADATION QUANT: CPT

## 2023-04-19 PROCEDURE — 80048 BASIC METABOLIC PNL TOTAL CA: CPT

## 2023-04-19 PROCEDURE — 99285 EMERGENCY DEPT VISIT HI MDM: CPT

## 2023-04-19 PROCEDURE — 71046 X-RAY EXAM CHEST 2 VIEWS: CPT

## 2023-04-19 PROCEDURE — 93005 ELECTROCARDIOGRAM TRACING: CPT | Performed by: STUDENT IN AN ORGANIZED HEALTH CARE EDUCATION/TRAINING PROGRAM

## 2023-04-19 PROCEDURE — 85025 COMPLETE CBC W/AUTO DIFF WBC: CPT

## 2023-04-19 PROCEDURE — 84484 ASSAY OF TROPONIN QUANT: CPT

## 2023-04-19 ASSESSMENT — PAIN DESCRIPTION - LOCATION: LOCATION: GENERALIZED

## 2023-04-19 ASSESSMENT — PAIN SCALES - GENERAL: PAINLEVEL_OUTOF10: 3

## 2023-04-19 ASSESSMENT — PAIN DESCRIPTION - DESCRIPTORS: DESCRIPTORS: DISCOMFORT

## 2023-04-19 ASSESSMENT — PAIN - FUNCTIONAL ASSESSMENT: PAIN_FUNCTIONAL_ASSESSMENT: 0-10

## 2023-04-20 ENCOUNTER — APPOINTMENT (OUTPATIENT)
Dept: CT IMAGING | Age: 68
End: 2023-04-20
Payer: MEDICARE

## 2023-04-20 LAB
EKG ATRIAL RATE: 86 BPM
EKG P AXIS: 70 DEGREES
EKG P-R INTERVAL: 168 MS
EKG Q-T INTERVAL: 394 MS
EKG QRS DURATION: 96 MS
EKG QTC CALCULATION (BAZETT): 471 MS
EKG R AXIS: 79 DEGREES
EKG T AXIS: 64 DEGREES
EKG VENTRICULAR RATE: 86 BPM

## 2023-04-20 PROCEDURE — 71260 CT THORAX DX C+: CPT

## 2023-04-20 PROCEDURE — 2580000003 HC RX 258: Performed by: STUDENT IN AN ORGANIZED HEALTH CARE EDUCATION/TRAINING PROGRAM

## 2023-04-20 PROCEDURE — 6360000004 HC RX CONTRAST MEDICATION: Performed by: STUDENT IN AN ORGANIZED HEALTH CARE EDUCATION/TRAINING PROGRAM

## 2023-04-20 RX ORDER — 0.9 % SODIUM CHLORIDE 0.9 %
100 INTRAVENOUS SOLUTION INTRAVENOUS ONCE
Status: COMPLETED | OUTPATIENT
Start: 2023-04-20 | End: 2023-04-20

## 2023-04-20 RX ORDER — SODIUM CHLORIDE 0.9 % (FLUSH) 0.9 %
10 SYRINGE (ML) INJECTION PRN
Status: COMPLETED | OUTPATIENT
Start: 2023-04-20 | End: 2023-04-20

## 2023-04-20 RX ADMIN — IOPAMIDOL 75 ML: 755 INJECTION, SOLUTION INTRAVENOUS at 00:23

## 2023-04-20 RX ADMIN — SODIUM CHLORIDE, PRESERVATIVE FREE 10 ML: 5 INJECTION INTRAVENOUS at 00:23

## 2023-04-20 RX ADMIN — SODIUM CHLORIDE 100 ML: 9 INJECTION, SOLUTION INTRAVENOUS at 00:23

## 2023-04-20 ASSESSMENT — ENCOUNTER SYMPTOMS
SHORTNESS OF BREATH: 1
BACK PAIN: 0
ABDOMINAL PAIN: 0
COUGH: 0

## 2023-04-20 ASSESSMENT — PAIN - FUNCTIONAL ASSESSMENT: PAIN_FUNCTIONAL_ASSESSMENT: NONE - DENIES PAIN

## 2023-04-20 NOTE — DISCHARGE INSTRUCTIONS
Your work-up here was unremarkable. You had a CT PE study performed which was unremarkable as well. Your lung nodule was stable. Please discuss this with your pulmonologist to see if they would like any further testing. Please follow with your primary care provider as soon as possible. Please return to the emergency room if you develop any worsening or concerning symptoms.

## 2023-04-20 NOTE — ED PROVIDER NOTES
EMERGENCY DEPARTMENT ENCOUNTER   ATTENDING ATTESTATION     Pt Name: Kymberly Nam  MRN: [id number]  [place] 1955  Date of evaluation: 4/19/23       Kymberly Nam is a 69 y.o. male who presents with Shortness of Breath (Sudden onset of shortness of breath that began 1 hour ago. )    Laid down tonight after eating felt like he couldn't breath    Cardiac workup/dyspnea workup including d dimer        MDM:     Labs negative, CT negative    Completely improved    May have had some issue with COPD but just had recent cardiac catheterization which was negative doubt cardiac etiology and negative CT PE doubt pulmonary embolism or other emergent pathology. Feel safe for discharge with outpatient follow up      Vitals:   Vitals:    04/19/23 2253 04/19/23 2307   BP: (!) 168/89 137/73   Pulse: 89 73   Resp: 18 18   Temp: 98.4 °F (36.9 °C)    TempSrc: Temporal    SpO2: 93% 94%   Weight: 282 lb (127.9 kg)    Height: 6' 1\" (1.854 m)          I personally saw and examined the patient. I have reviewed and agree with the resident's findings, including all diagnostic interpretations and treatment plan as written. I was present for the key portions of any procedures performed and the inclusive time noted for any critical care statement.     Fatoumata Carmona MD  Attending Emergency Physician          Fatoumata Carmona MD  04/20/23 0100
Feeling better on arrival to the emergency department. Started this evening when he laid down to go to bed. Never thing that this happened before. Patient appears well initial evaluation, afebrile, stable vital signs. Maintaining saturations on room air. Lungs clear to auscultation bilaterally. Will obtain basic work-up including labs, chest x-ray, EKG. Will obtain D-dimer as well to further evaluate for PE. Will reevaluate. Problems Addressed:  Shortness of breath: acute illness or injury    Amount and/or Complexity of Data Reviewed  Labs: ordered. Decision-making details documented in ED Course. Radiology: ordered. Decision-making details documented in ED Course. ECG/medicine tests: ordered. Risk  Prescription drug management. EKG  EKG Interpretation    Interpreted by me    Rhythm: Sinus rhythm with sinus arrhythmia  Rate: 86  Axis: normal  Ectopy: none  Conduction: normal  ST Segments: Nonspecific changes  T Waves: no acute change  Q Waves: none    Clinical Impression: Nonspecific EKG    All EKG's are interpreted by the Emergency Department Physician who either signs or Co-signs this chart in the absence of a cardiologist.    EMERGENCY DEPARTMENT COURSE:    ED Course as of 04/20/23 0109   Wed Apr 19, 2023 2321 WBC: 9.1 [AB]   2321 Hemoglobin Quant: 14.6 [AB]   1149 D-Dimer, Quant(!): 1.69  Elevated, will add on CT PE study [AB]   2352 Troponin, High Sensitivity: 6  We will trend [AB]   8551 Pro-BNP: 220 [AB]   2352 Creatinine: 0.93 [AB]   2352 XR CHEST (2 VW)  IMPRESSION:  No acute abnormality. [AB]   u Apr 20, 2023   0056 CT CHEST PULMONARY EMBOLISM W CONTRAST  IMPRESSION:  No pulmonary embolism or acute pulmonary abnormality. [AB]   0059 Work-up here unremarkable. Patient is maintaining saturations on off oxygen. Feeling improved. We will plan on discharge at this time with close follow-up with primary care provider.   Additionally advised to discuss CT PE study with pulmonologist

## 2023-04-25 ENCOUNTER — OFFICE VISIT (OUTPATIENT)
Dept: PULMONOLOGY | Age: 68
End: 2023-04-25
Payer: MEDICARE

## 2023-04-25 VITALS
BODY MASS INDEX: 37.37 KG/M2 | DIASTOLIC BLOOD PRESSURE: 89 MMHG | OXYGEN SATURATION: 98 % | HEART RATE: 68 BPM | WEIGHT: 282 LBS | HEIGHT: 73 IN | SYSTOLIC BLOOD PRESSURE: 153 MMHG | RESPIRATION RATE: 18 BRPM

## 2023-04-25 DIAGNOSIS — R91.1 LUNG NODULE: ICD-10-CM

## 2023-04-25 DIAGNOSIS — G47.33 OSA (OBSTRUCTIVE SLEEP APNEA): ICD-10-CM

## 2023-04-25 DIAGNOSIS — Z87.891 HISTORY OF SMOKING AT LEAST 1 PACK PER DAY FOR AT LEAST 30 YEARS: ICD-10-CM

## 2023-04-25 DIAGNOSIS — J44.9 CHRONIC OBSTRUCTIVE PULMONARY DISEASE, UNSPECIFIED COPD TYPE (HCC): Primary | ICD-10-CM

## 2023-04-25 DIAGNOSIS — E66.9 OBESITY (BMI 35.0-39.9 WITHOUT COMORBIDITY): ICD-10-CM

## 2023-04-25 DIAGNOSIS — E66.01 SEVERE OBESITY (BMI 35.0-39.9) WITH COMORBIDITY (HCC): ICD-10-CM

## 2023-04-25 PROCEDURE — 1123F ACP DISCUSS/DSCN MKR DOCD: CPT | Performed by: INTERNAL MEDICINE

## 2023-04-25 PROCEDURE — 3079F DIAST BP 80-89 MM HG: CPT | Performed by: INTERNAL MEDICINE

## 2023-04-25 PROCEDURE — 99214 OFFICE O/P EST MOD 30 MIN: CPT | Performed by: INTERNAL MEDICINE

## 2023-04-25 PROCEDURE — 3077F SYST BP >= 140 MM HG: CPT | Performed by: INTERNAL MEDICINE

## 2023-04-25 RX ORDER — ALBUTEROL SULFATE 90 UG/1
2 AEROSOL, METERED RESPIRATORY (INHALATION) EVERY 6 HOURS PRN
Qty: 18 G | Refills: 11 | Status: SHIPPED | OUTPATIENT
Start: 2023-04-25 | End: 2023-05-25

## 2023-04-25 ASSESSMENT — SLEEP AND FATIGUE QUESTIONNAIRES
HOW LIKELY ARE YOU TO NOD OFF OR FALL ASLEEP IN A CAR, WHILE STOPPED FOR A FEW MINUTES IN TRAFFIC: 0
ESS TOTAL SCORE: 0
HOW LIKELY ARE YOU TO NOD OFF OR FALL ASLEEP WHILE SITTING INACTIVE IN A PUBLIC PLACE: 0
HOW LIKELY ARE YOU TO NOD OFF OR FALL ASLEEP WHEN YOU ARE A PASSENGER IN A CAR FOR AN HOUR WITHOUT A BREAK: 0
HOW LIKELY ARE YOU TO NOD OFF OR FALL ASLEEP WHILE SITTING AND TALKING TO SOMEONE: 0
HOW LIKELY ARE YOU TO NOD OFF OR FALL ASLEEP WHILE LYING DOWN TO REST IN THE AFTERNOON WHEN CIRCUMSTANCES PERMIT: 0
HOW LIKELY ARE YOU TO NOD OFF OR FALL ASLEEP WHILE WATCHING TV: 0
HOW LIKELY ARE YOU TO NOD OFF OR FALL ASLEEP WHILE SITTING AND READING: 0
HOW LIKELY ARE YOU TO NOD OFF OR FALL ASLEEP WHILE SITTING QUIETLY AFTER LUNCH WITHOUT ALCOHOL: 0

## 2023-04-25 NOTE — PATIENT INSTRUCTIONS
[email or web address]@        YOU ARE BEING REFERRED TO:    FAUZIA MAYEN (a [facility])    [address] [place] [address] [address], [place]    Main Phone Number: [phone]    Phone number for scheduling sleep study: [phone]      Please contact the above numbers to schedule your sleep study. Once you have completed the FIRST NIGHT you may be asked to return for a SECOND NIGHT if necessary. After the SECOND NIGHT the sleep center will order a CPAP/BiPAP machine for you. An order for the machine will be sent to a durable medical equipment company ([employer]). The sleep center will provide you with the [employer] companies information. Once you have your machine please contact our office to schedule a follow up appointment. Your follow up will be scheduled for a date 30-90 days after you have received your machine. At that follow up visit we will need to have a compliance download from your DME company. Please contact your [employer] company to have the compliance download faxed to our office at   [phone] for your follow up appointment. IF YOU HAVE ANY QUESTIONS ABOUT YOUR SLEEP STUDY   PLEASE CONTACT THE SLEEP CENTER.
[FreeTextEntry1] : Doing okay. Denies chest pain or palpitations. Complains of shortness of breath at times. Has nasal congestion and history of nasal polyps.

## 2023-04-25 NOTE — PROGRESS NOTES
OUTPATIENT PULMONARY PROGRESS NOTE      Patient:  Mayelin Plata  MRN: [id number]    Consulting Physician: Dinesh St  Reason for Consult: COPD  Primacy Care Physician: VIKY Martinez CNP    HISTORY OF PRESENT ILLNESS:   The patient is a 69 y.o. male   He is therefore follow-up of COPD lung nodule, history of allergic rhinitis and obstructive sleep apnea    According to patient he was doing well until a week ago when he presented to emergency room with shortness of breath. According to patient he was doing well and all of a sudden he developed episode of suffocation he felt that his throat was closing he could not breathe and have neck and chest tightness. Per patient it happens after he ate veggie roll ups with soy sauce and think that that was causing. Patient did not take albuterol in the emergency room he had work-up done which was negative he was observed in the emergency room for 4 hours apparently he was starting feeling better in the emergency room on presentation. He had a CTA chest done which was negative for pulm embolism. He never had that episode before and does not usually take albuterol. He did not have any cough and congestion fever chills during that episode or before that and in the emergency room he was not given any steroids or bronchodilator treatment. He does not complain of reflux symptoms he did not have nausea or vomiting. He has chronic dyspnea on exertion activity and when he has to climb the stairs denies much change in that 1. He is not very active same. He has chronic pedal edema which she denies any change no increase. He has chronic mild orthopnea which has been the same and no PND. He does not complain of daily or persistent cough denies wheezing and denies sputum production denies any change in the color of the sputum if he has any. He is taking his Breo once daily and does not usually require any albuterol he does not have albuterol anymore.     He was scheduled

## 2023-05-11 ENCOUNTER — PROCEDURE VISIT (OUTPATIENT)
Dept: UROLOGY | Age: 68
End: 2023-05-11

## 2023-05-11 ENCOUNTER — HOSPITAL ENCOUNTER (OUTPATIENT)
Dept: PREADMISSION TESTING | Age: 68
Discharge: HOME | End: 2023-05-11
Attending: UROLOGY | Admitting: UROLOGY
Payer: MEDICARE

## 2023-05-11 ENCOUNTER — HOSPITAL ENCOUNTER (OUTPATIENT)
Age: 68
Setting detail: SPECIMEN
Discharge: HOME | End: 2023-05-11

## 2023-05-11 ENCOUNTER — TELEPHONE (OUTPATIENT)
Dept: UROLOGY | Age: 68
End: 2023-05-11

## 2023-05-11 VITALS
RESPIRATION RATE: 18 BRPM | TEMPERATURE: 98.2 F | DIASTOLIC BLOOD PRESSURE: 92 MMHG | SYSTOLIC BLOOD PRESSURE: 168 MMHG | OXYGEN SATURATION: 99 % | HEART RATE: 70 BPM

## 2023-05-11 DIAGNOSIS — C67.2 MALIGNANT NEOPLASM OF LATERAL WALL OF URINARY BLADDER (HCC): Primary | ICD-10-CM

## 2023-05-11 DIAGNOSIS — R31.0 GROSS HEMATURIA: ICD-10-CM

## 2023-05-11 PROCEDURE — 87086 URINE CULTURE/COLONY COUNT: CPT

## 2023-05-11 PROCEDURE — APPSS45 APP SPLIT SHARED TIME 31-45 MINUTES: Performed by: NURSE PRACTITIONER

## 2023-05-11 ASSESSMENT — ENCOUNTER SYMPTOMS
SORE THROAT: 0
SHORTNESS OF BREATH: 1
VOMITING: 0
COUGH: 0
ABDOMINAL PAIN: 0
ABDOMINAL DISTENTION: 1
CONSTIPATION: 1
NAUSEA: 0
WHEEZING: 0
BACK PAIN: 1

## 2023-05-11 NOTE — DISCHARGE INSTRUCTIONS
Pre-op Instructions For Out-Patient Surgery    Medication Instructions:  Please stop herbs and any supplements now (includes vitamins and minerals). Please contact your surgeon and prescribing physician for pre-op instructions for any blood thinners. Stop Aspirin as directed    If you have inhalers/aerosol treatments at home, please use them the morning of your surgery and bring the inhalers with you to the hospital.    Please take the following medications the morning of your surgery with a sip of water:   Lansoprazole, Inhaler    Surgery Instructions:  After midnight before surgery:  Do not eat or drink anything, including water, mints, gum, and hard candy. You may brush your teeth without swallowing. No smoking, chewing tobacco, or street drugs. Please shower or bathe before surgery. If you were given Surgical Scrub Chlorhexidine Gluconate Liquid (CHG), please shower the night before and the morning of your surgery following the detailed instructions you received during your pre-admission visit. Please do not wear any cologne, lotion, powder, deodorant, jewelry, piercings, perfume, makeup, nail polish, hair accessories, or hair spray on the day of surgery. Wear loose comfortable clothing. Leave your valuables at home. Bring a storage case for any glasses/contacts. An adult who is responsible for you MUST drive you home and should be with you for the first 24 hours after surgery. If having out-patient knee and foot surgeries, please arrange for planned crutches, walker, or wheelchair before arriving to the hospital.    The Day of Surgery:  Arrive at [address] [place] [address] Surgery Entrance at the time directed by your surgeon and check in at the desk. If you have a living will or healthcare power of , please bring a copy. You will be taken to the pre-op holding area where you will be prepared for surgery.   A physical assessment will be performed

## 2023-05-11 NOTE — PROGRESS NOTES
Cystoscopy Operative Note (5/11/23)  Surgeon: Taylor Syed   Anesthesia: Urethral 2% Xylocaine   Indications: Bladder Cancer   Position: Dorsal Lithotomy    Findings:   Risks and Benefits discussed with patient prior to procedure. The patient was prepped and draped in the usual sterile fashion. The flexible cystoscope was advanced through the urethra and into the bladder. The bladder was thoroughly inspected and the following was noted:    Residual Urine: none  Urethra: normal appearing urethra with no masses, tenderness or lesions  Prostate: partially obstructing lateral lobes of prostate; median lobe present? no.   Bladder: No tumors or CIS noted. No bladder diverticulum. There was small tumor left lateral wall. trabeculation noted. Ureters: Clear efflux from both ureters. Orifices with normal configuration and location. The cystoscope was removed. The patient tolerated the procedure well. DO BLADDER BIOPSY FULG  THEN SIX WEEKS LATER DO BCG  Agree with the ROS entered by the MA.

## 2023-05-11 NOTE — H&P
Radiology Results [address] at 1:28  pm on 2022 to be communicated to a licensed caregiver. Cardiac Stress TestGiovani Craig  Order: [id number]  Status: Final result     Visible to patient: Yes (seen)     Next appt: 2023 at 09:15 AM in Family Medicine (VIKY Vazquez CNP)     0 Result Notes     Procedure Note                      [address] [place], [place] [address]                               CARDIAC STRESS TEST     PATIENT NAME: Tanvi Syed                     :        1955  MED REC NO:   [id number]                              ROOM:  ACCOUNT NO:   [de-identified]                           ADMIT DATE: 2022  PROVIDER:     Hayden Bond DO     DATE OF STUDY:  2022     ORDERING PROVIDER:  CHARO Bergeron     PRIMARY CARE PROVIDER:  CHARO Bergeron     INTERPRETING PHYSICIAN:  FLASH PERSAUD DO     _____ STRESS TESTING     TEST TYPE: LEXISCAN CARDIOLYTE STRESS TEST  INDICATION: SHORTNESS OF BREATH, PRE-OP  REFERRING PHYSICIAN: CHARO SALAZAR     RESTING HEART RATE: 64 BEATS PER MINUTE  RESTING BLOOD PRESSURE: 139/73     MEDICATION(S) GIVEN: 0.4MG IV LEXISCAN  REASON FOR TERMINATION: MEDICATION INFUSION COMPLETE     RESTING EKG: ABNORMAL, NORMAL SINUS RHYTHM, MARGE  STRESS HEART RESPONSE: NORMAL RESPONSE  BLOOD PRESSURE RESPONSE: APPROPRIATE  STRESS EKGs: NORMAL  CHEST DISCOMFORT: NO PAIN DURING STRESS  ISCHEMIC EKG CHANGES: NONE     EKG IMPRESSION: ELECTROCARDIOGRAPHICALLY NEGATIVE LEXISCAN STRESS TEST. RADIOISOTOPE RESULTS TO FOLLOW FROM THE DEPARTMENT OF NUCLEAR MEDICINE.      COMMENTS:           Ambika Arias DO     D: 2022 9:26:05       T: 2022 9:27:18     /YISEL  Job#: [id number]     Doc#: Unknown     CC:    (Retain this field even if not dictated or not decipherable)          Last Resulted: 22 09:27 EDT             PAST MEDICAL HISTORY     Past Medical History:   Diagnosis Date    Anxiety

## 2023-05-11 NOTE — TELEPHONE ENCOUNTER
Cysto, bladder bx & fulguration @ [address] [place] [address] 5/16/23 **STOP BLOOD THINNERS 5/11/23**   PAT @ [address] [place] [address] 5/11/23 12:45pm         Spoke with patient in office, procedure info emailed.

## 2023-05-12 DIAGNOSIS — R31.0 GROSS HEMATURIA: ICD-10-CM

## 2023-05-12 DIAGNOSIS — C67.2 MALIGNANT NEOPLASM OF LATERAL WALL OF URINARY BLADDER (HCC): ICD-10-CM

## 2023-05-12 LAB
MICROORGANISM SPEC CULT: NO GROWTH
MICROORGANISM SPEC CULT: NO GROWTH
SPECIMEN DESCRIPTION: NORMAL
SPECIMEN DESCRIPTION: NORMAL

## 2023-05-15 ENCOUNTER — ANESTHESIA EVENT (OUTPATIENT)
Dept: OPERATING ROOM | Age: 68
End: 2023-05-15
Payer: MEDICARE

## 2023-05-15 NOTE — PRE-PROCEDURE INSTRUCTIONS
No answer, left message ? -YES                           Unable to leave message ? When were you told to arrive at hospital ?  -0815    Do you have a  ? Are you on any blood thinners ? If yes when did you stop taking ? Do you have your prep Rx filled and instruction ? Nothing to eat the day before , only clear liquids. Are you experiencing any covid symptoms ? Do you have any infections or rash we should be aware of ? Do you have the Hibiclens soap to use the night before and the morning of surgery ? Nothing to eat or drink after midnight, only a sip of water to take any medication instructed to take the night before. Wear comfortable clothing, leave any valuables at home, remove any jewelry and body piercing .

## 2023-05-16 ENCOUNTER — ANESTHESIA (OUTPATIENT)
Dept: OPERATING ROOM | Age: 68
End: 2023-05-16
Payer: MEDICARE

## 2023-05-16 ENCOUNTER — HOSPITAL ENCOUNTER (OUTPATIENT)
Age: 68
Setting detail: OUTPATIENT SURGERY
Discharge: HOME | End: 2023-05-16
Attending: UROLOGY | Admitting: UROLOGY
Payer: MEDICARE

## 2023-05-16 VITALS
DIASTOLIC BLOOD PRESSURE: 66 MMHG | TEMPERATURE: 96.9 F | RESPIRATION RATE: 16 BRPM | HEART RATE: 62 BPM | HEIGHT: 71 IN | OXYGEN SATURATION: 94 % | BODY MASS INDEX: 38.5 KG/M2 | WEIGHT: 275 LBS | SYSTOLIC BLOOD PRESSURE: 143 MMHG

## 2023-05-16 DIAGNOSIS — C67.9 MALIGNANT NEOPLASM OF URINARY BLADDER, UNSPECIFIED SITE (HCC): ICD-10-CM

## 2023-05-16 PROCEDURE — 3700000001 HC ADD 15 MINUTES (ANESTHESIA): Performed by: UROLOGY

## 2023-05-16 PROCEDURE — 2709999900 HC NON-CHARGEABLE SUPPLY: Performed by: UROLOGY

## 2023-05-16 PROCEDURE — 88305 TISSUE EXAM BY PATHOLOGIST: CPT

## 2023-05-16 PROCEDURE — 2580000003 HC RX 258: Performed by: ANESTHESIOLOGY

## 2023-05-16 PROCEDURE — 7100000031 HC ASPR PHASE II RECOVERY - ADDTL 15 MIN: Performed by: UROLOGY

## 2023-05-16 PROCEDURE — 7100000030 HC ASPR PHASE II RECOVERY - FIRST 15 MIN: Performed by: UROLOGY

## 2023-05-16 PROCEDURE — 3700000000 HC ANESTHESIA ATTENDED CARE: Performed by: UROLOGY

## 2023-05-16 PROCEDURE — 7100000010 HC PHASE II RECOVERY - FIRST 15 MIN: Performed by: UROLOGY

## 2023-05-16 PROCEDURE — 7100000001 HC PACU RECOVERY - ADDTL 15 MIN: Performed by: UROLOGY

## 2023-05-16 PROCEDURE — 2500000003 HC RX 250 WO HCPCS

## 2023-05-16 PROCEDURE — 3600000012 HC SURGERY LEVEL 2 ADDTL 15MIN: Performed by: UROLOGY

## 2023-05-16 PROCEDURE — 7100000000 HC PACU RECOVERY - FIRST 15 MIN: Performed by: UROLOGY

## 2023-05-16 PROCEDURE — 6360000002 HC RX W HCPCS

## 2023-05-16 PROCEDURE — 3600000002 HC SURGERY LEVEL 2 BASE: Performed by: UROLOGY

## 2023-05-16 PROCEDURE — 7100000011 HC PHASE II RECOVERY - ADDTL 15 MIN: Performed by: UROLOGY

## 2023-05-16 RX ORDER — CEFAZOLIN SODIUM 1 G/3ML
INJECTION, POWDER, FOR SOLUTION INTRAMUSCULAR; INTRAVENOUS PRN
Status: DISCONTINUED | OUTPATIENT
Start: 2023-05-16 | End: 2023-05-16 | Stop reason: SDUPTHER

## 2023-05-16 RX ORDER — SODIUM CHLORIDE 0.9 % (FLUSH) 0.9 %
5-40 SYRINGE (ML) INJECTION EVERY 12 HOURS SCHEDULED
Status: DISCONTINUED | OUTPATIENT
Start: 2023-05-16 | End: 2023-05-16 | Stop reason: HOSPADM

## 2023-05-16 RX ORDER — SODIUM CHLORIDE, SODIUM LACTATE, POTASSIUM CHLORIDE, CALCIUM CHLORIDE 600; 310; 30; 20 MG/100ML; MG/100ML; MG/100ML; MG/100ML
INJECTION, SOLUTION INTRAVENOUS CONTINUOUS
Status: DISCONTINUED | OUTPATIENT
Start: 2023-05-16 | End: 2023-05-16 | Stop reason: HOSPADM

## 2023-05-16 RX ORDER — LIDOCAINE HYDROCHLORIDE 10 MG/ML
1 INJECTION, SOLUTION EPIDURAL; INFILTRATION; INTRACAUDAL; PERINEURAL
Status: DISCONTINUED | OUTPATIENT
Start: 2023-05-16 | End: 2023-05-16 | Stop reason: HOSPADM

## 2023-05-16 RX ORDER — FENTANYL CITRATE 50 UG/ML
INJECTION, SOLUTION INTRAMUSCULAR; INTRAVENOUS PRN
Status: DISCONTINUED | OUTPATIENT
Start: 2023-05-16 | End: 2023-05-16 | Stop reason: SDUPTHER

## 2023-05-16 RX ORDER — DIPHENHYDRAMINE HYDROCHLORIDE 50 MG/ML
12.5 INJECTION INTRAMUSCULAR; INTRAVENOUS
Status: DISCONTINUED | OUTPATIENT
Start: 2023-05-16 | End: 2023-05-16 | Stop reason: HOSPADM

## 2023-05-16 RX ORDER — HYDROCODONE BITARTRATE AND ACETAMINOPHEN 5; 325 MG/1; MG/1
1 TABLET ORAL EVERY 6 HOURS PRN
Status: DISCONTINUED | OUTPATIENT
Start: 2023-05-16 | End: 2023-05-16 | Stop reason: HOSPADM

## 2023-05-16 RX ORDER — SODIUM CHLORIDE 0.9 % (FLUSH) 0.9 %
5-40 SYRINGE (ML) INJECTION PRN
Status: DISCONTINUED | OUTPATIENT
Start: 2023-05-16 | End: 2023-05-16 | Stop reason: HOSPADM

## 2023-05-16 RX ORDER — SODIUM CHLORIDE 9 MG/ML
INJECTION, SOLUTION INTRAVENOUS PRN
Status: DISCONTINUED | OUTPATIENT
Start: 2023-05-16 | End: 2023-05-16 | Stop reason: HOSPADM

## 2023-05-16 RX ORDER — PROPOFOL 10 MG/ML
INJECTION, EMULSION INTRAVENOUS PRN
Status: DISCONTINUED | OUTPATIENT
Start: 2023-05-16 | End: 2023-05-16 | Stop reason: SDUPTHER

## 2023-05-16 RX ORDER — SODIUM CHLORIDE 9 MG/ML
25 INJECTION, SOLUTION INTRAVENOUS PRN
Status: DISCONTINUED | OUTPATIENT
Start: 2023-05-16 | End: 2023-05-16 | Stop reason: HOSPADM

## 2023-05-16 RX ORDER — FENTANYL CITRATE 0.05 MG/ML
25 INJECTION, SOLUTION INTRAMUSCULAR; INTRAVENOUS EVERY 5 MIN PRN
Status: DISCONTINUED | OUTPATIENT
Start: 2023-05-16 | End: 2023-05-16 | Stop reason: HOSPADM

## 2023-05-16 RX ORDER — CEPHALEXIN 500 MG/1
500 CAPSULE ORAL 2 TIMES DAILY
Qty: 6 CAPSULE | Refills: 0 | Status: SHIPPED | OUTPATIENT
Start: 2023-05-16

## 2023-05-16 RX ORDER — LIDOCAINE HYDROCHLORIDE 20 MG/ML
INJECTION, SOLUTION EPIDURAL; INFILTRATION; INTRACAUDAL; PERINEURAL PRN
Status: DISCONTINUED | OUTPATIENT
Start: 2023-05-16 | End: 2023-05-16 | Stop reason: SDUPTHER

## 2023-05-16 RX ORDER — DEXAMETHASONE SODIUM PHOSPHATE 4 MG/ML
INJECTION, SOLUTION INTRA-ARTICULAR; INTRALESIONAL; INTRAMUSCULAR; INTRAVENOUS; SOFT TISSUE PRN
Status: DISCONTINUED | OUTPATIENT
Start: 2023-05-16 | End: 2023-05-16 | Stop reason: SDUPTHER

## 2023-05-16 RX ORDER — LABETALOL HYDROCHLORIDE 5 MG/ML
10 INJECTION, SOLUTION INTRAVENOUS
Status: DISCONTINUED | OUTPATIENT
Start: 2023-05-16 | End: 2023-05-16 | Stop reason: HOSPADM

## 2023-05-16 RX ORDER — ONDANSETRON 2 MG/ML
INJECTION INTRAMUSCULAR; INTRAVENOUS PRN
Status: DISCONTINUED | OUTPATIENT
Start: 2023-05-16 | End: 2023-05-16 | Stop reason: SDUPTHER

## 2023-05-16 RX ORDER — ACETAMINOPHEN 500 MG
1000 TABLET ORAL ONCE
Status: DISCONTINUED | OUTPATIENT
Start: 2023-05-16 | End: 2023-05-16 | Stop reason: HOSPADM

## 2023-05-16 RX ORDER — MIDAZOLAM HYDROCHLORIDE 1 MG/ML
INJECTION INTRAMUSCULAR; INTRAVENOUS PRN
Status: DISCONTINUED | OUTPATIENT
Start: 2023-05-16 | End: 2023-05-16 | Stop reason: SDUPTHER

## 2023-05-16 RX ORDER — GABAPENTIN 100 MG/1
100 CAPSULE ORAL ONCE
Status: DISCONTINUED | OUTPATIENT
Start: 2023-05-16 | End: 2023-05-16 | Stop reason: HOSPADM

## 2023-05-16 RX ORDER — ONDANSETRON 2 MG/ML
4 INJECTION INTRAMUSCULAR; INTRAVENOUS
Status: DISCONTINUED | OUTPATIENT
Start: 2023-05-16 | End: 2023-05-16 | Stop reason: HOSPADM

## 2023-05-16 RX ORDER — HYDRALAZINE HYDROCHLORIDE 20 MG/ML
10 INJECTION INTRAMUSCULAR; INTRAVENOUS
Status: DISCONTINUED | OUTPATIENT
Start: 2023-05-16 | End: 2023-05-16 | Stop reason: HOSPADM

## 2023-05-16 RX ADMIN — MIDAZOLAM 2 MG: 1 INJECTION INTRAMUSCULAR; INTRAVENOUS at 10:33

## 2023-05-16 RX ADMIN — FENTANYL CITRATE 25 MCG: 50 INJECTION, SOLUTION INTRAMUSCULAR; INTRAVENOUS at 10:33

## 2023-05-16 RX ADMIN — CEFAZOLIN 2 G: 1 INJECTION, POWDER, FOR SOLUTION INTRAMUSCULAR; INTRAVENOUS at 10:46

## 2023-05-16 RX ADMIN — SODIUM CHLORIDE, POTASSIUM CHLORIDE, SODIUM LACTATE AND CALCIUM CHLORIDE: 600; 310; 30; 20 INJECTION, SOLUTION INTRAVENOUS at 08:52

## 2023-05-16 RX ADMIN — DEXAMETHASONE SODIUM PHOSPHATE 8 MG: 4 INJECTION, SOLUTION INTRAMUSCULAR; INTRAVENOUS at 10:36

## 2023-05-16 RX ADMIN — FENTANYL CITRATE 25 MCG: 50 INJECTION, SOLUTION INTRAMUSCULAR; INTRAVENOUS at 10:47

## 2023-05-16 RX ADMIN — LIDOCAINE HYDROCHLORIDE 80 MG: 20 INJECTION, SOLUTION EPIDURAL; INFILTRATION; INTRACAUDAL; PERINEURAL at 10:33

## 2023-05-16 RX ADMIN — PROPOFOL 200 MG: 10 INJECTION, EMULSION INTRAVENOUS at 10:33

## 2023-05-16 RX ADMIN — FENTANYL CITRATE 25 MCG: 50 INJECTION, SOLUTION INTRAMUSCULAR; INTRAVENOUS at 10:50

## 2023-05-16 RX ADMIN — ONDANSETRON 4 MG: 2 INJECTION INTRAMUSCULAR; INTRAVENOUS at 10:36

## 2023-05-16 ASSESSMENT — PAIN - FUNCTIONAL ASSESSMENT: PAIN_FUNCTIONAL_ASSESSMENT: 0-10

## 2023-05-16 ASSESSMENT — PAIN SCALES - GENERAL
PAINLEVEL_OUTOF10: 0
PAINLEVEL_OUTOF10: 0

## 2023-05-16 ASSESSMENT — LIFESTYLE VARIABLES: SMOKING_STATUS: 0

## 2023-05-16 ASSESSMENT — ENCOUNTER SYMPTOMS: SHORTNESS OF BREATH: 1

## 2023-05-16 NOTE — BRIEF OP NOTE
Brief Postoperative Note      Patient: Dacia Redman  YOB: 1955  MRN: [id number]    Date of Procedure: 5/16/2023    Pre-Op Diagnosis Codes:     * Malignant neoplasm of urinary bladder, unspecified site ([facility] [C67.9]    Post-Op Diagnosis: Same       Procedure(s):  CYSTOSCOPY BLADDER BIOPSY & FULGURATION    Surgeon(s):  Leslie Kaur MD    Assistant:  * No surgical staff found *    Anesthesia: General    Estimated Blood Loss (mL): Minimal    Complications: None    Specimens:   * No specimens in log *    Implants:  * No implants in log *      Drains: * No LDAs found *    Findings: d      Electronically signed by Leslie Kaur MD on 5/16/2023 at 9:32 AM

## 2023-05-16 NOTE — DISCHARGE INSTRUCTIONS
DISCHARGE INSTRUCTIONS FOR CYSTOSCOPY    In order to continue your care at home, please follow the instructions below. For General Anesthesia  Do not drink any alcoholic beverages or make any legal or important decisions for 24 hours. No driving or operating machinery for 24 hours. Diet    Drink plenty of fluids after surgery, unless you are on a fluid restriction. After general anesthesia, start out eating lightly (broth, soup, crackers, toast, etc.) advancing as tolerated to your usual diet. Try to avoid spicy or greasy/fatty foods for 24 hours. Avoid milk products for several hours. Medications  Take medications as instructed by your surgeon. Please do not take prescribed pain medication with alcoholic beverages. Do not drive or operate machinery while taking any prescribed pain medication. Activities  As instructed by your surgeon. Limit your activities for 24 hours. Avoid heavy work or sports until surgeon approves. No lifting, pushing, pulling, straining until surgeon approves. You may shower. Surgery Area or Examination Site  After the cystoscopy, your urethra may be sore at first, and it may burn when you urinate for the first few days after the procedure. You may feel the need to urinate more often, and your urine may be pink. These symptoms should get better in 1 or 2 days. Call your surgeon for the following: You have pain that does not get better after you take pain medicine. For an oral temperature (by mouth) is 101 degrees or higher, chills, or excessive sweating. Persistent nausea or vomiting and cant keep fluids down. You have trouble passing urine or stool, especially if you have pain or swelling in your lower belly. If you are unable to urinate within 8 hours of surgery. Your urine is still red or you see blood clots after you have urinated several times. Your urine gets cloudy or smells bad. You have pain in your back just below your rib cage.  This is

## 2023-05-16 NOTE — ANESTHESIA PRE PROCEDURE
hyperlipidemia    (-)  angina    ECG reviewed  Rhythm: regular  Rate: normal                    Neuro/Psych:   Negative Neuro/Psych ROS              GI/Hepatic/Renal:   (+) GERD: well controlled,           Endo/Other:    (+) malignancy/cancer (bladder). Abdominal:   (+) obese,           Vascular: Other Findings:           Anesthesia Plan      general     ASA 3       Induction: intravenous. MIPS: Postoperative opioids intended and Prophylactic antiemetics administered. Anesthetic plan and risks discussed with patient. Plan discussed with CRNA.                     Liudmila Shepherd MD   5/16/2023

## 2023-05-16 NOTE — ANESTHESIA POSTPROCEDURE EVALUATION
Department of Anesthesiology  Postprocedure Note    Patient: Gwenetta Mcardle  MRN: [id number]  YOB: 1955  Date of evaluation: 5/16/2023      Procedure Summary     Date: 05/16/23 Room / Location: [address] [place] [address] / [facility]: CHEL ENRIQUEZ    Anesthesia Start: 1026 Anesthesia Stop: 1106    Procedure: CYSTOSCOPY BLADDER BIOPSY & FULGURATION (Bladder) Diagnosis:       Malignant neoplasm of urinary bladder, unspecified site ([facility]      (Malignant neoplasm of urinary bladder, unspecified site [facility]) [C67.9])    Surgeons: Miriam Styles MD Responsible Provider: Magy Rodriguez MD    Anesthesia Type: general ASA Status: 3          Anesthesia Type: No value filed.     Ann Marie Phase I: Ann Marie Score: 10    Ann Marie Phase II: Ann Marie Score: 10      Anesthesia Post Evaluation    Comments: POST- ANESTHESIA EVALUATION       Pt Name: Gwenetta Mcardle  MRN: [id number]  YOB: 1955  Date of evaluation: 5/16/2023  Time:  12:34 PM      BP (!) 143/66   Pulse 62   Temp 96.9 °F (36.1 °C) (Temporal)   Resp 16   Ht 5' 11\" (1.803 m)   Wt 275 lb (124.7 kg)   SpO2 94%   BMI 38.35 kg/m²      Consciousness Level  Awake  Cardiopulmonary Status  Stable  Pain Adequately Treated YES  Nausea / Vomiting  NO  Adequate Hydration  YES  Anesthesia Related Complications NONE      Electronically signed by Magy Rodriguez MD on 5/16/2023 at 12:34 PM

## 2023-05-16 NOTE — INTERVAL H&P NOTE
recent lab work reviewed:  Lab Results   Component Value Date     04/19/2023    K 3.8 04/19/2023     04/19/2023    CO2 22 04/19/2023    BUN 13 04/19/2023    CREATININE 0.93 04/19/2023    GLUCOSE 101 (H) 04/19/2023    CALCIUM 8.9 04/19/2023    PROT 7.0 10/11/2022    LABALBU 3.9 10/11/2022    BILITOT 0.8 10/11/2022    ALKPHOS 82 10/11/2022    AST 16 10/11/2022    ALT 19 10/11/2022    LABGLOM >60 04/19/2023    GFRAA >60 06/20/2022       Lab Results   Component Value Date    WBC 9.1 04/19/2023    HGB 14.6 04/19/2023    HCT 42.0 04/19/2023    MCV 91.0 04/19/2023     04/19/2023       Past Medical History:   Diagnosis Date    Anxiety     Arthritis     Gilliam esophagus     RESOLVED    Basal cell carcinoma of left upper arm 02/09/2018    Bladder cancer ([facility]     started work up 6/2022, needed cardiac clearance, diagnosed with pathology 9/2022    Bleeding ulcer 1985    duodenal perforation/ bleed    Chest tightness     Colon polyps     COPD (chronic obstructive pulmonary disease) ([facility]     COVID-19 09/23/2022    sore throat x 1 day; received Paxlovid    GERD (gastroesophageal reflux disease)     Gout     RT HAND    History of blood transfusion 1985    DUODENAL BLEED/ no reaction    Hyperlipidemia     Hypertension     Insomnia     Lung nodule     has yearly follow up CT scans    Mild sleep apnea     no machine    Obesity     Palpitations     SOB (shortness of breath) 04/2023    Under care of team     UROLOGY - DR. SÁNCHEZ - LAST SEEN 03/21/23    Under care of team     PULMONOLOGY- DR. Rafaela Martinez - LAST VISIT 05/2023    Under care of team     GI - DR. GUERRA, last seen 3/1/2023    Under care of team     CARDIOLOGY - DR. JUAREZ,  last seen 07/2022    Wears glasses     Wears partial dentures     Bottom    Wellness examination     PCP - Sammie Gaitan CNP - LAST VISIT - 11/2022       Past Surgical History:   Procedure Laterality Date    ABDOMINAL EXPLORATION SURGERY  1985    perforated duodenal ulcer    APPENDECTOMY

## 2023-05-17 LAB — SURGICAL PATHOLOGY REPORT: NORMAL

## 2023-05-17 NOTE — OP NOTE
[address]                 [address] [facility], [address]                                OPERATIVE REPORT    PATIENT NAME: Nessa Erwin                     :        1955  MED REC NO:   [id number]                              ROOM:  ACCOUNT NO:   [de-identified]                           ADMIT DATE: 2023  PROVIDER:     Cuco Carrasco    DATE OF PROCEDURE:  2023    PREOPERATIVE DIAGNOSES:  Bladder cancer, bladder lesion. POSTOPERATIVE DIAGNOSES:  Bladder cancer, bladder lesion. OPERATION PERFORMED:  Cystoscopy and bladder biopsy and fulguration. SURGEON:  Cuco Carrasco MD    ASSISTANT:  None. ANESTHESIA:  General.    COMPLICATIONS:  None. ESTIMATED BLOOD LOSS:  Minimal.    NARRATIVE OF THE PROCEDURE:  This is a 68-year-old white male, who has a  history of bladder cancer. He had recent cystoscopy. There was a small  bladder lesion noticed on the left lateral wall and thus, the above  procedure was scheduled. All the risks and benefits were explained to  the patient and informed consent was obtained. OPERATIVE PROCEDURE:  This 68-year-old white male was brought back to  the operating room and positioned in the modified dorsal lithotomy  position. After general anesthesia was started, he was sterilely  prepped and draped in the standard fashion. A 22-Australian rigid  cystoscope was inserted into his urethra and advanced into his bladder. His bladder was examined. A left lateral wall small papillary tumor was  identified. There was some erythema in the left lateral wall also along  with the right bladder neck. The rest of the bladder was normal.  I  then advanced a cold up biopsy forceps and I took biopsies of the  papillary lesion. This was sent as permanent specimen to Pathology. I  fulgurated this entire area. I fulgurated the other erythematous  patches both on the left and the right side. No other lesions were  identified.

## 2023-05-30 PROBLEM — M25.511 ACUTE PAIN OF RIGHT SHOULDER: Status: RESOLVED | Noted: 2018-02-09 | Resolved: 2023-05-30

## 2023-06-20 ENCOUNTER — HOSPITAL ENCOUNTER (OUTPATIENT)
Age: 68
Setting detail: SPECIMEN
Discharge: HOME | End: 2023-06-20

## 2023-06-20 DIAGNOSIS — K22.70 BARRETT'S ESOPHAGUS WITHOUT DYSPLASIA: ICD-10-CM

## 2023-06-20 DIAGNOSIS — I10 ESSENTIAL HYPERTENSION: ICD-10-CM

## 2023-06-20 DIAGNOSIS — E78.00 PURE HYPERCHOLESTEROLEMIA: ICD-10-CM

## 2023-06-20 DIAGNOSIS — Z12.11 COLON CANCER SCREENING: ICD-10-CM

## 2023-06-20 DIAGNOSIS — D12.6 TUBULAR ADENOMA OF COLON: ICD-10-CM

## 2023-06-20 DIAGNOSIS — R73.9 HYPERGLYCEMIA: ICD-10-CM

## 2023-06-20 LAB
ALBUMIN SERPL-MCNC: 4 G/DL (ref 3.5–5.2)
ALBUMIN/GLOB SERPL: 1.5 {RATIO} (ref 1–2.5)
ALP SERPL-CCNC: 81 U/L (ref 40–129)
ALT SERPL-CCNC: 21 U/L (ref 5–41)
ANION GAP SERPL CALCULATED.3IONS-SCNC: 15 MMOL/L (ref 9–17)
AST SERPL-CCNC: 20 U/L
BILIRUB SERPL-MCNC: 0.7 MG/DL (ref 0.3–1.2)
BUN SERPL-MCNC: 15 MG/DL (ref 8–23)
CALCIUM SERPL-MCNC: 9.2 MG/DL (ref 8.6–10.4)
CHLORIDE SERPL-SCNC: 102 MMOL/L (ref 98–107)
CHOLEST SERPL-MCNC: 178 MG/DL
CHOLESTEROL/HDL RATIO: 3.2
CO2 SERPL-SCNC: 23 MMOL/L (ref 20–31)
CREAT SERPL-MCNC: 1 MG/DL (ref 0.7–1.2)
ERYTHROCYTE [DISTWIDTH] IN BLOOD BY AUTOMATED COUNT: 12.9 % (ref 11.8–14.4)
GFR SERPL CREATININE-BSD FRML MDRD: >60 ML/MIN/1.73M2
GLUCOSE SERPL-MCNC: 96 MG/DL (ref 70–99)
HCT VFR BLD AUTO: 48.3 % (ref 40.7–50.3)
HDLC SERPL-MCNC: 55 MG/DL
HGB BLD-MCNC: 15.5 G/DL (ref 13–17)
LDLC SERPL CALC-MCNC: 94 MG/DL (ref 0–130)
MCH RBC QN AUTO: 31.5 PG (ref 25.2–33.5)
MCHC RBC AUTO-ENTMCNC: 32.1 G/DL (ref 28.4–34.8)
MCV RBC AUTO: 98.2 FL (ref 82.6–102.9)
NRBC AUTOMATED: 0 PER 100 WBC
PLATELET # BLD AUTO: 273 K/UL (ref 138–453)
PMV BLD AUTO: 9.4 FL (ref 8.1–13.5)
POTASSIUM SERPL-SCNC: 5.1 MMOL/L (ref 3.7–5.3)
PROT SERPL-MCNC: 6.7 G/DL (ref 6.4–8.3)
RBC # BLD AUTO: 4.92 M/UL (ref 4.21–5.77)
SODIUM SERPL-SCNC: 140 MMOL/L (ref 135–144)
TRIGL SERPL-MCNC: 145 MG/DL
URATE SERPL-MCNC: 8.2 MG/DL (ref 3.4–7)
WBC OTHER # BLD: 7.7 K/UL (ref 3.5–11.3)

## 2023-06-21 LAB
EST. AVERAGE GLUCOSE BLD GHB EST-MCNC: 94 MG/DL
HBA1C MFR BLD: 4.9 % (ref 4–6)

## 2023-06-30 ENCOUNTER — TELEPHONE (OUTPATIENT)
Dept: UROLOGY | Age: 68
End: 2023-06-30

## 2023-07-05 ENCOUNTER — PROCEDURE VISIT (OUTPATIENT)
Dept: UROLOGY | Age: 68
End: 2023-07-05
Payer: MEDICARE

## 2023-07-05 VITALS — BODY MASS INDEX: 40.32 KG/M2 | WEIGHT: 288 LBS | HEIGHT: 71 IN

## 2023-07-05 DIAGNOSIS — R31.0 GROSS HEMATURIA: ICD-10-CM

## 2023-07-05 DIAGNOSIS — C67.2 MALIGNANT NEOPLASM OF LATERAL WALL OF URINARY BLADDER (HCC): Primary | ICD-10-CM

## 2023-07-05 LAB
BILIRUBIN, POC: NORMAL
BLOOD URINE, POC: NORMAL
CLARITY, POC: CLEAR
COLOR, POC: YELLOW
GLUCOSE URINE, POC: NORMAL
KETONES, POC: NORMAL
LEUKOCYTE EST, POC: NORMAL
NITRITE, POC: NORMAL
PH, POC: NORMAL
PROTEIN, POC: NORMAL
SPECIFIC GRAVITY, POC: NORMAL
UROBILINOGEN, POC: NORMAL

## 2023-07-05 PROCEDURE — 81002 URINALYSIS NONAUTO W/O SCOPE: CPT | Performed by: NURSE PRACTITIONER

## 2023-07-05 PROCEDURE — 51720 TREATMENT OF BLADDER LESION: CPT | Performed by: NURSE PRACTITIONER

## 2023-07-05 NOTE — PROGRESS NOTES
BCG Treatment Note:    7/5/23    Treatment 1 of 6. Risks, benefits, and some potential complications were discussed at length with the patient including infection, bleeding, voiding discomfort, fever, chills, cough, joint pain, and others. All questions were answered. Sterile technique and intraurethral analgesia were used. A red rubber catheter was gently placed through the urethra and into the patients bladder. The bladder was emptied of all urine. Approximately 50 cc of BCG was instilled into the patient's bladder. The red rubber catheter was gently removed. The patient was given the following instructions before leaving the office after treatment:  - The patient was instructed not to empty his bladder for at least two hours  - Void while seated to avoid splashing of urine.  - For the 6 hours following the treatment, add an equal amount of bleach to the toilet/urine to disinfect for 15 minutes before flushing.   - Use the same toilet to void in for 24 hours after the BCG, the clean toilet with bleach. - Patient given BCG Post-Treatment Information sheet. The procedure was well-tolerated and without complications. Verbal and written instructions were given to call the office for joint pain, cough,skin rash,fever (temp higher than 101.5 for more than 24 hours, or if your symptoms are intense or last longer than 72 hours. The patient stated that they understood these instructions and would comply with them.           Electronically signed by VIKY Marie CNP on 7/5/2023 at 10:34 AM

## 2023-07-11 NOTE — PROGRESS NOTES
BCG Treatment Note:    7/11/23    Treatment 2 of 6. Risks, benefits, and some potential complications were discussed at length with the patient including infection, bleeding, voiding discomfort, fever, chills, cough, joint pain, and others. All questions were answered. Sterile technique and intraurethral analgesia were used. A red rubber catheter was gently placed through the urethra and into the patients bladder. The bladder was emptied of all urine. Approximately 50 cc of BCG was instilled into the patient's bladder. The red rubber catheter was gently removed. The patient was given the following instructions before leaving the office after treatment:  - The patient was instructed not to empty his bladder for at least two hours  - Void while seated to avoid splashing of urine.  - For the 6 hours following the treatment, add an equal amount of bleach to the toilet/urine to disinfect for 15 minutes before flushing.   - Use the same toilet to void in for 24 hours after the BCG, the clean toilet with bleach. - Patient given BCG Post-Treatment Information sheet. The procedure was well-tolerated and without complications. Verbal and written instructions were given to call the office for joint pain, cough,skin rash,fever (temp higher than 101.5 for more than 24 hours, or if your symptoms are intense or last longer than 72 hours. The patient stated that they understood these instructions and would comply with them.           Electronically signed by VIKY Guadarrama CNP on 7/12/2023 at 10:21 AM

## 2023-07-12 ENCOUNTER — PROCEDURE VISIT (OUTPATIENT)
Dept: UROLOGY | Age: 68
End: 2023-07-12
Payer: MEDICARE

## 2023-07-12 VITALS
SYSTOLIC BLOOD PRESSURE: 137 MMHG | BODY MASS INDEX: 40.32 KG/M2 | HEIGHT: 71 IN | DIASTOLIC BLOOD PRESSURE: 78 MMHG | TEMPERATURE: 97.5 F | WEIGHT: 288 LBS

## 2023-07-12 DIAGNOSIS — C67.2 MALIGNANT NEOPLASM OF LATERAL WALL OF URINARY BLADDER (HCC): Primary | ICD-10-CM

## 2023-07-12 LAB
APPEARANCE FLUID: NORMAL
BILIRUBIN, POC: NORMAL
BLOOD URINE, POC: NORMAL
CLARITY, POC: CLEAR
COLOR, POC: YELLOW
GLUCOSE URINE, POC: NORMAL
KETONES, POC: NORMAL
LEUKOCYTE EST, POC: NORMAL
NITRITE, POC: NORMAL
PH, POC: NORMAL
PROTEIN, POC: NORMAL
SPECIFIC GRAVITY, POC: NORMAL
UROBILINOGEN, POC: NORMAL

## 2023-07-12 PROCEDURE — 81002 URINALYSIS NONAUTO W/O SCOPE: CPT | Performed by: NURSE PRACTITIONER

## 2023-07-12 PROCEDURE — 51720 TREATMENT OF BLADDER LESION: CPT | Performed by: NURSE PRACTITIONER

## 2023-07-19 ENCOUNTER — PROCEDURE VISIT (OUTPATIENT)
Dept: UROLOGY | Age: 68
End: 2023-07-19
Payer: MEDICARE

## 2023-07-19 VITALS — WEIGHT: 288 LBS | HEIGHT: 71 IN | BODY MASS INDEX: 40.32 KG/M2

## 2023-07-19 DIAGNOSIS — C67.2 MALIGNANT NEOPLASM OF LATERAL WALL OF URINARY BLADDER (HCC): Primary | ICD-10-CM

## 2023-07-19 PROCEDURE — 51720 TREATMENT OF BLADDER LESION: CPT | Performed by: NURSE PRACTITIONER

## 2023-07-19 PROCEDURE — 81002 URINALYSIS NONAUTO W/O SCOPE: CPT | Performed by: NURSE PRACTITIONER

## 2023-07-19 NOTE — PROGRESS NOTES
BCG Treatment Note:    7/19/23    Treatment 3 of 6. Risks, benefits, and some potential complications were discussed at length with the patient including infection, bleeding, voiding discomfort, fever, chills, cough, joint pain, and others. All questions were answered. Sterile technique and intraurethral analgesia were used. A red rubber catheter was gently placed through the urethra and into the patients bladder. The bladder was emptied of all urine. Approximately 50 cc of BCG was instilled into the patient's bladder. The red rubber catheter was gently removed. The patient was given the following instructions before leaving the office after treatment:  - The patient was instructed not to empty his bladder for at least two hours  - Void while seated to avoid splashing of urine.  - For the 6 hours following the treatment, add an equal amount of bleach to the toilet/urine to disinfect for 15 minutes before flushing.   - Use the same toilet to void in for 24 hours after the BCG, the clean toilet with bleach. - Patient given BCG Post-Treatment Information sheet. The procedure was well-tolerated and without complications. Verbal and written instructions were given to call the office for joint pain, cough,skin rash,fever (temp higher than 101.5 for more than 24 hours, or if your symptoms are intense or last longer than 72 hours. The patient stated that they understood these instructions and would comply with them.           Electronically signed by VIKY Gonzalez CNP on 7/19/2023 at 11:10 AM

## 2023-07-26 ENCOUNTER — TELEPHONE (OUTPATIENT)
Dept: UROLOGY | Age: 68
End: 2023-07-26

## 2023-07-26 ENCOUNTER — PROCEDURE VISIT (OUTPATIENT)
Dept: UROLOGY | Age: 68
End: 2023-07-26
Payer: MEDICARE

## 2023-07-26 VITALS
DIASTOLIC BLOOD PRESSURE: 96 MMHG | HEIGHT: 71 IN | WEIGHT: 288 LBS | SYSTOLIC BLOOD PRESSURE: 130 MMHG | BODY MASS INDEX: 40.32 KG/M2 | TEMPERATURE: 96.9 F | HEART RATE: 78 BPM

## 2023-07-26 DIAGNOSIS — N32.89 BLADDER SPASM: ICD-10-CM

## 2023-07-26 DIAGNOSIS — C67.2 MALIGNANT NEOPLASM OF LATERAL WALL OF URINARY BLADDER (HCC): Primary | ICD-10-CM

## 2023-07-26 PROCEDURE — 51720 TREATMENT OF BLADDER LESION: CPT | Performed by: NURSE PRACTITIONER

## 2023-07-26 PROCEDURE — 81002 URINALYSIS NONAUTO W/O SCOPE: CPT | Performed by: NURSE PRACTITIONER

## 2023-07-26 RX ORDER — HYOSCYAMINE SULFATE 0.12 MG/1
0.12 TABLET SUBLINGUAL EVERY 4 HOURS PRN
Qty: 30 EACH | Refills: 0 | Status: SHIPPED | OUTPATIENT
Start: 2023-07-26

## 2023-07-26 NOTE — PROGRESS NOTES
BCG Treatment Note:    7/26/23    Treatment 4 of 6. Risks, benefits, and some potential complications were discussed at length with the patient including infection, bleeding, voiding discomfort, fever, chills, cough, joint pain, and others. All questions were answered. Sterile technique and intraurethral analgesia were used. A red rubber catheter was gently placed through the urethra and into the patients bladder. The bladder was emptied of all urine. Approximately 50 cc of BCG was instilled into the patient's bladder. The red rubber catheter was gently removed. The patient was given the following instructions before leaving the office after treatment:  - The patient was instructed not to empty his bladder for at least two hours  - Void while seated to avoid splashing of urine.  - For the 6 hours following the treatment, add an equal amount of bleach to the toilet/urine to disinfect for 15 minutes before flushing.   - Use the same toilet to void in for 24 hours after the BCG, the clean toilet with bleach. - Patient given BCG Post-Treatment Information sheet. The procedure was well-tolerated and without complications. Verbal and written instructions were given to call the office for joint pain, cough,skin rash,fever (temp higher than 101.5 for more than 24 hours, or if your symptoms are intense or last longer than 72 hours. The patient stated that they understood these instructions and would comply with them.           Electronically signed by VIKY Granger CNP on 7/26/2023 at 9:41 AM

## 2023-07-26 NOTE — TELEPHONE ENCOUNTER
Patient is here for his fourth BCG. Patient stated that after his last one he had issues with burning and pain with urination. Also noticed spasms on right side and back. He would like to know if this normal with BCGs.     Please advise

## 2023-07-26 NOTE — TELEPHONE ENCOUNTER
POCT UA was negative in office today, discussed levsin and was given rx for that PRN.  He is to keep us updated of any new/worsening symptoms

## 2023-08-02 ENCOUNTER — PROCEDURE VISIT (OUTPATIENT)
Dept: UROLOGY | Age: 68
End: 2023-08-02
Payer: MEDICARE

## 2023-08-02 ENCOUNTER — HOSPITAL ENCOUNTER (OUTPATIENT)
Age: 68
Setting detail: SPECIMEN
Discharge: HOME | End: 2023-08-02

## 2023-08-02 VITALS
SYSTOLIC BLOOD PRESSURE: 134 MMHG | TEMPERATURE: 97.9 F | HEIGHT: 71 IN | BODY MASS INDEX: 40.32 KG/M2 | DIASTOLIC BLOOD PRESSURE: 70 MMHG | WEIGHT: 288 LBS | HEART RATE: 98 BPM

## 2023-08-02 DIAGNOSIS — C67.2 MALIGNANT NEOPLASM OF LATERAL WALL OF URINARY BLADDER (HCC): Primary | ICD-10-CM

## 2023-08-02 LAB
APPEARANCE FLUID: NORMAL
BILIRUBIN, POC: NORMAL
BLOOD URINE, POC: NORMAL
CLARITY, POC: NORMAL
COLOR, POC: NORMAL
GLUCOSE URINE, POC: NORMAL
KETONES, POC: NORMAL
LEUKOCYTE EST, POC: NORMAL
NITRITE, POC: NORMAL
PH, POC: NORMAL
PROTEIN, POC: NORMAL
SPECIFIC GRAVITY, POC: NORMAL
UROBILINOGEN, POC: NORMAL

## 2023-08-02 PROCEDURE — 81002 URINALYSIS NONAUTO W/O SCOPE: CPT | Performed by: NURSE PRACTITIONER

## 2023-08-02 PROCEDURE — 51720 TREATMENT OF BLADDER LESION: CPT | Performed by: NURSE PRACTITIONER

## 2023-08-02 NOTE — PROGRESS NOTES
BCG Treatment Note:    8/2/23    Treatment 5 of 6. Risks, benefits, and some potential complications were discussed at length with the patient including infection, bleeding, voiding discomfort, fever, chills, cough, joint pain, and others. All questions were answered. Sterile technique and intraurethral analgesia were used. A red rubber catheter was gently placed through the urethra and into the patients bladder. The bladder was emptied of all urine. Approximately 50 cc of BCG was instilled into the patient's bladder. The red rubber catheter was gently removed. The patient was given the following instructions before leaving the office after treatment:  - The patient was instructed not to empty his bladder for at least two hours  - Void while seated to avoid splashing of urine.  - For the 6 hours following the treatment, add an equal amount of bleach to the toilet/urine to disinfect for 15 minutes before flushing.   - Use the same toilet to void in for 24 hours after the BCG, the clean toilet with bleach. - Patient given BCG Post-Treatment Information sheet. The procedure was well-tolerated and without complications. Verbal and written instructions were given to call the office for joint pain, cough,skin rash,fever (temp higher than 101.5 for more than 24 hours, or if your symptoms are intense or last longer than 72 hours. The patient stated that they understood these instructions and would comply with them.           Electronically signed by VIKY Mcintosh CNP on 8/2/2023 at 12:57 PM

## 2023-08-03 DIAGNOSIS — C67.2 MALIGNANT NEOPLASM OF LATERAL WALL OF URINARY BLADDER (HCC): ICD-10-CM

## 2023-08-04 LAB
MICROORGANISM SPEC CULT: NO GROWTH
SPECIMEN DESCRIPTION: NORMAL

## 2023-08-09 ENCOUNTER — PROCEDURE VISIT (OUTPATIENT)
Dept: UROLOGY | Age: 68
End: 2023-08-09
Payer: MEDICARE

## 2023-08-09 VITALS
TEMPERATURE: 97 F | DIASTOLIC BLOOD PRESSURE: 74 MMHG | HEIGHT: 71 IN | SYSTOLIC BLOOD PRESSURE: 128 MMHG | WEIGHT: 288 LBS | BODY MASS INDEX: 40.32 KG/M2 | HEART RATE: 76 BPM

## 2023-08-09 DIAGNOSIS — C67.2 MALIGNANT NEOPLASM OF LATERAL WALL OF URINARY BLADDER (HCC): Primary | ICD-10-CM

## 2023-08-09 LAB
APPEARANCE FLUID: CLEAR
BILIRUBIN, POC: NORMAL
BLOOD URINE, POC: NORMAL
CLARITY, POC: CLEAR
COLOR, POC: YELLOW
GLUCOSE URINE, POC: NORMAL
KETONES, POC: NORMAL
LEUKOCYTE EST, POC: NORMAL
NITRITE, POC: NORMAL
PH, POC: NORMAL
PROTEIN, POC: NORMAL
SPECIFIC GRAVITY, POC: NORMAL
UROBILINOGEN, POC: NORMAL

## 2023-08-09 PROCEDURE — 51720 TREATMENT OF BLADDER LESION: CPT | Performed by: NURSE PRACTITIONER

## 2023-08-09 PROCEDURE — 81002 URINALYSIS NONAUTO W/O SCOPE: CPT | Performed by: NURSE PRACTITIONER

## 2023-10-09 RX ORDER — TIOTROPIUM BROMIDE INHALATION SPRAY 3.12 UG/1
SPRAY, METERED RESPIRATORY (INHALATION)
Qty: 12 G | Refills: 3 | Status: SHIPPED | OUTPATIENT
Start: 2023-10-09

## 2023-11-21 ENCOUNTER — HOSPITAL ENCOUNTER (OUTPATIENT)
Age: 68
Setting detail: SPECIMEN
Discharge: HOME | End: 2023-11-21

## 2023-11-21 ENCOUNTER — PROCEDURE VISIT (OUTPATIENT)
Dept: UROLOGY | Age: 68
End: 2023-11-21
Payer: MEDICARE

## 2023-11-21 VITALS — RESPIRATION RATE: 16 BRPM | WEIGHT: 275 LBS | BODY MASS INDEX: 38.5 KG/M2 | HEIGHT: 71 IN

## 2023-11-21 DIAGNOSIS — C67.2 MALIGNANT NEOPLASM OF LATERAL WALL OF URINARY BLADDER (HCC): Primary | ICD-10-CM

## 2023-11-21 PROCEDURE — 52000 CYSTOURETHROSCOPY: CPT | Performed by: UROLOGY

## 2023-11-21 NOTE — PROGRESS NOTES
Cystoscopy Operative Note (11/21/23)  Surgeon: Juan Martinez MD   Anesthesia: Urethral 2% Xylocaine   Indications: Bladder Cancer   Position: Dorsal Lithotomy    Findings:   Risks and Benefits discussed with patient prior to procedure. The patient was prepped and draped in the usual sterile fashion. The flexible cystoscope was advanced through the urethra and into the bladder. The bladder was thoroughly inspected and the following was noted:    Residual Urine: none  Urethra: normal appearing urethra with no masses, tenderness or lesions  Prostate: partially obstructing lateral lobes of prostate; median lobe present? no.   Bladder: No tumors or CIS noted. No bladder diverticulum. There was none trabeculation noted. Ureters: Clear efflux from both ureters. Orifices with normal configuration and location. The cystoscope was removed. The patient tolerated the procedure well. Agree with the ROS entered by the MA.

## 2023-11-22 DIAGNOSIS — C67.2 MALIGNANT NEOPLASM OF LATERAL WALL OF URINARY BLADDER (HCC): ICD-10-CM

## 2023-11-28 LAB — UROTHELIAL CANCER DETECTION: NORMAL

## 2023-11-29 ENCOUNTER — HOSPITAL ENCOUNTER (OUTPATIENT)
Age: 68
Setting detail: SPECIMEN
Discharge: HOME | End: 2023-11-29

## 2023-11-29 DIAGNOSIS — E78.00 PURE HYPERCHOLESTEROLEMIA: ICD-10-CM

## 2023-11-29 DIAGNOSIS — Z12.5 SCREENING FOR MALIGNANT NEOPLASM OF PROSTATE: ICD-10-CM

## 2023-11-29 DIAGNOSIS — R73.9 HYPERGLYCEMIA: ICD-10-CM

## 2023-11-29 DIAGNOSIS — I10 ESSENTIAL HYPERTENSION: ICD-10-CM

## 2023-11-29 DIAGNOSIS — M1A.0410 CHRONIC GOUT OF RIGHT HAND, UNSPECIFIED CAUSE: ICD-10-CM

## 2023-11-29 LAB
ALBUMIN SERPL-MCNC: 4.1 G/DL (ref 3.5–5.2)
ALBUMIN/GLOB SERPL: 1.5 {RATIO} (ref 1–2.5)
ALP SERPL-CCNC: 74 U/L (ref 40–129)
ALT SERPL-CCNC: 20 U/L (ref 5–41)
ANION GAP SERPL CALCULATED.3IONS-SCNC: 10 MMOL/L (ref 9–17)
AST SERPL-CCNC: 21 U/L
BILIRUB SERPL-MCNC: 0.6 MG/DL (ref 0.3–1.2)
BUN SERPL-MCNC: 16 MG/DL (ref 8–23)
CALCIUM SERPL-MCNC: 9.8 MG/DL (ref 8.6–10.4)
CHLORIDE SERPL-SCNC: 103 MMOL/L (ref 98–107)
CHOLEST SERPL-MCNC: 172 MG/DL
CHOLESTEROL/HDL RATIO: 3
CO2 SERPL-SCNC: 28 MMOL/L (ref 20–31)
CREAT SERPL-MCNC: 1.1 MG/DL (ref 0.7–1.2)
ERYTHROCYTE [DISTWIDTH] IN BLOOD BY AUTOMATED COUNT: 12.6 % (ref 11.8–14.4)
EST. AVERAGE GLUCOSE BLD GHB EST-MCNC: 94 MG/DL
GFR SERPL CREATININE-BSD FRML MDRD: >60 ML/MIN/1.73M2
GLUCOSE SERPL-MCNC: 95 MG/DL (ref 70–99)
HBA1C MFR BLD: 4.9 % (ref 4–6)
HCT VFR BLD AUTO: 46.9 % (ref 40.7–50.3)
HDLC SERPL-MCNC: 57 MG/DL
HGB BLD-MCNC: 15.4 G/DL (ref 13–17)
LDLC SERPL CALC-MCNC: 85 MG/DL (ref 0–130)
MCH RBC QN AUTO: 31.5 PG (ref 25.2–33.5)
MCHC RBC AUTO-ENTMCNC: 32.8 G/DL (ref 28.4–34.8)
MCV RBC AUTO: 95.9 FL (ref 82.6–102.9)
NRBC BLD-RTO: 0 PER 100 WBC
PLATELET # BLD AUTO: 287 K/UL (ref 138–453)
PMV BLD AUTO: 9.5 FL (ref 8.1–13.5)
POTASSIUM SERPL-SCNC: 5.6 MMOL/L (ref 3.7–5.3)
PROT SERPL-MCNC: 6.8 G/DL (ref 6.4–8.3)
PSA SERPL-MCNC: 2.45 NG/ML
RBC # BLD AUTO: 4.89 M/UL (ref 4.21–5.77)
SODIUM SERPL-SCNC: 141 MMOL/L (ref 135–144)
TRIGL SERPL-MCNC: 149 MG/DL
URATE SERPL-MCNC: 7.3 MG/DL (ref 3.4–7)
WBC OTHER # BLD: 8.2 K/UL (ref 3.5–11.3)

## 2023-12-13 ENCOUNTER — OFFICE VISIT (OUTPATIENT)
Dept: PULMONOLOGY | Age: 68
End: 2023-12-13
Payer: MEDICARE

## 2023-12-13 VITALS
SYSTOLIC BLOOD PRESSURE: 164 MMHG | RESPIRATION RATE: 20 BRPM | BODY MASS INDEX: 40.04 KG/M2 | DIASTOLIC BLOOD PRESSURE: 84 MMHG | HEIGHT: 71 IN | HEART RATE: 73 BPM | WEIGHT: 286 LBS | OXYGEN SATURATION: 98 %

## 2023-12-13 DIAGNOSIS — G47.33 OSA (OBSTRUCTIVE SLEEP APNEA): ICD-10-CM

## 2023-12-13 DIAGNOSIS — E66.01 SEVERE OBESITY (BMI 35.0-39.9) WITH COMORBIDITY (HCC): ICD-10-CM

## 2023-12-13 DIAGNOSIS — Z87.891 HISTORY OF SMOKING AT LEAST 1 PACK PER DAY FOR AT LEAST 30 YEARS: ICD-10-CM

## 2023-12-13 DIAGNOSIS — J44.9 CHRONIC OBSTRUCTIVE PULMONARY DISEASE, UNSPECIFIED COPD TYPE (HCC): Primary | ICD-10-CM

## 2023-12-13 DIAGNOSIS — R91.1 LUNG NODULE: ICD-10-CM

## 2023-12-13 DIAGNOSIS — Z87.891 PERSONAL HISTORY OF TOBACCO USE: ICD-10-CM

## 2023-12-13 PROCEDURE — 3079F DIAST BP 80-89 MM HG: CPT | Performed by: INTERNAL MEDICINE

## 2023-12-13 PROCEDURE — G0296 VISIT TO DETERM LDCT ELIG: HCPCS | Performed by: INTERNAL MEDICINE

## 2023-12-13 PROCEDURE — 3077F SYST BP >= 140 MM HG: CPT | Performed by: INTERNAL MEDICINE

## 2023-12-13 PROCEDURE — 1123F ACP DISCUSS/DSCN MKR DOCD: CPT | Performed by: INTERNAL MEDICINE

## 2023-12-13 PROCEDURE — 99214 OFFICE O/P EST MOD 30 MIN: CPT | Performed by: INTERNAL MEDICINE

## 2023-12-13 ASSESSMENT — SLEEP AND FATIGUE QUESTIONNAIRES
HOW LIKELY ARE YOU TO NOD OFF OR FALL ASLEEP WHILE SITTING INACTIVE IN A PUBLIC PLACE: 0
ESS TOTAL SCORE: 0
HOW LIKELY ARE YOU TO NOD OFF OR FALL ASLEEP WHILE SITTING QUIETLY AFTER LUNCH WITHOUT ALCOHOL: 0
HOW LIKELY ARE YOU TO NOD OFF OR FALL ASLEEP WHILE LYING DOWN TO REST IN THE AFTERNOON WHEN CIRCUMSTANCES PERMIT: 0
HOW LIKELY ARE YOU TO NOD OFF OR FALL ASLEEP WHILE WATCHING TV: 0
HOW LIKELY ARE YOU TO NOD OFF OR FALL ASLEEP WHILE SITTING AND TALKING TO SOMEONE: 0
HOW LIKELY ARE YOU TO NOD OFF OR FALL ASLEEP WHEN YOU ARE A PASSENGER IN A CAR FOR AN HOUR WITHOUT A BREAK: 0
HOW LIKELY ARE YOU TO NOD OFF OR FALL ASLEEP IN A CAR, WHILE STOPPED FOR A FEW MINUTES IN TRAFFIC: 0
HOW LIKELY ARE YOU TO NOD OFF OR FALL ASLEEP WHILE SITTING AND READING: 0

## 2023-12-13 NOTE — PROGRESS NOTES
mmol/L Final   04/19/2023 3.8 3.7 - 5.3 mmol/L Final     Chloride   Date Value Ref Range Status   11/29/2023 103 98 - 107 mmol/L Final   06/20/2023 102 98 - 107 mmol/L Final   04/19/2023 101 98 - 107 mmol/L Final     CO2   Date Value Ref Range Status   11/29/2023 28 20 - 31 mmol/L Final   06/20/2023 23 20 - 31 mmol/L Final   04/19/2023 22 20 - 31 mmol/L Final     BUN   Date Value Ref Range Status   11/29/2023 16 8 - 23 mg/dL Final   06/20/2023 15 8 - 23 mg/dL Final   04/19/2023 13 8 - 23 mg/dL Final     Creatinine   Date Value Ref Range Status   11/29/2023 1.1 0.7 - 1.2 mg/dL Final   06/20/2023 1.00 0.70 - 1.20 mg/dL Final   04/19/2023 0.93 0.70 - 1.20 mg/dL Final     Glucose   Date Value Ref Range Status   11/29/2023 95 70 - 99 mg/dL Final   06/20/2023 96 70 - 99 mg/dL Final   04/19/2023 101 (H) 70 - 99 mg/dL Final   01/23/2012 93 74 - 106 mg/dL Final   12/05/2011 101 74 - 106 mg/dL Final     Hepatic:     Amylase: No results found for: \"AMYLASE\"  Lipase: No results found for: \"LIPASE\"  CARDIAC ENZYMES: No results found for: \"CKTOTAL\", \"CKMB\", \"CKMBINDEX\", \"TROPONINI\"  BNP: No results found for: \"BNP\"  Lipids:       INR: No results found for: \"INR\"  Thyroid: No results found for: \"T4\", \"TSH\"  Urinalysis:     Cultures:-  -----------------------------------------------------------------    Immunization History   Administered Date(s) Administered    COVID-19, PFIZER Bivalent, DO NOT Dilute, (age 12y+), IM, 30 mcg/0.3 mL 11/30/2022    COVID-19, PFIZER GRAY top, DO NOT Dilute, (age 15 y+), IM, 30 mcg/0.3 mL 05/09/2022    COVID-19, PFIZER PURPLE top, DILUTE for use, (age 15 y+), 30mcg/0.3mL 02/22/2021, 03/15/2021, 11/11/2021    Influenza Vaccine, unspecified formulation 11/07/2016, 10/30/2018    Influenza Virus Vaccine 10/30/2013, 10/15/2014, 10/16/2015, 10/25/2017, 10/30/2018    Influenza, FLUAD, (age 72 y+), Adjuvanted, 0.5mL 10/30/2020, 11/05/2021, 11/07/2022, 11/09/2023    Influenza, High Dose (Fluzone 65 yrs and

## 2023-12-14 ENCOUNTER — HOSPITAL ENCOUNTER (OUTPATIENT)
Dept: SLEEP CENTER | Age: 68
Discharge: HOME | End: 2023-12-16
Payer: MEDICARE

## 2023-12-14 PROCEDURE — 95810 POLYSOM 6/> YRS 4/> PARAM: CPT

## 2024-01-11 ENCOUNTER — HOSPITAL ENCOUNTER (OUTPATIENT)
Dept: SLEEP CENTER | Age: 69
Discharge: HOME | End: 2024-01-13
Payer: MEDICARE

## 2024-01-11 PROCEDURE — 95811 POLYSOM 6/>YRS CPAP 4/> PARM: CPT

## 2024-01-12 ENCOUNTER — TELEPHONE (OUTPATIENT)
Dept: GASTROENTEROLOGY | Age: 69
End: 2024-01-12

## 2024-01-12 NOTE — TELEPHONE ENCOUNTER
Writer left vm about his referral if he would like to come in and see dr munoz. He was seen in march of this year but another referral was made.

## 2024-01-15 ENCOUNTER — TELEPHONE (OUTPATIENT)
Dept: GASTROENTEROLOGY | Age: 69
End: 2024-01-15

## 2024-01-16 LAB — STATUS: NORMAL

## 2024-01-31 ENCOUNTER — OFFICE VISIT (OUTPATIENT)
Dept: GASTROENTEROLOGY | Age: 69
End: 2024-01-31
Payer: MEDICARE

## 2024-01-31 VITALS
WEIGHT: 281 LBS | SYSTOLIC BLOOD PRESSURE: 136 MMHG | HEIGHT: 71 IN | HEART RATE: 67 BPM | DIASTOLIC BLOOD PRESSURE: 85 MMHG | BODY MASS INDEX: 39.34 KG/M2

## 2024-01-31 DIAGNOSIS — C67.9 MALIGNANT NEOPLASM OF URINARY BLADDER, UNSPECIFIED SITE (HCC): ICD-10-CM

## 2024-01-31 DIAGNOSIS — R10.31 ABDOMINAL PAIN, RIGHT LOWER QUADRANT: Primary | ICD-10-CM

## 2024-01-31 PROCEDURE — 3079F DIAST BP 80-89 MM HG: CPT | Performed by: INTERNAL MEDICINE

## 2024-01-31 PROCEDURE — 99214 OFFICE O/P EST MOD 30 MIN: CPT | Performed by: INTERNAL MEDICINE

## 2024-01-31 PROCEDURE — 3075F SYST BP GE 130 - 139MM HG: CPT | Performed by: INTERNAL MEDICINE

## 2024-01-31 PROCEDURE — 1123F ACP DISCUSS/DSCN MKR DOCD: CPT | Performed by: INTERNAL MEDICINE

## 2024-01-31 ASSESSMENT — ENCOUNTER SYMPTOMS
WHEEZING: 0
CONSTIPATION: 1
COUGH: 0
ABDOMINAL PAIN: 1
BACK PAIN: 0
SORE THROAT: 0
DIARRHEA: 1
VOICE CHANGE: 0
CHOKING: 0
RECTAL PAIN: 0
SINUS PRESSURE: 0
VOMITING: 0
ANAL BLEEDING: 0
TROUBLE SWALLOWING: 0
BLOOD IN STOOL: 0
NAUSEA: 0
ABDOMINAL DISTENTION: 0

## 2024-01-31 NOTE — PROGRESS NOTES
GI OFFICE FOLLOW UP    INTERVAL HISTORY:   No referring provider defined for this encounter.    Chief Complaint   Patient presents with    Abdominal Pain     Patient is here today to be seen for dull pain in the right upper quadrant.        No diagnosis found.          HISTORY OF PRESENT ILLNESS:   Patient seen with mild discomfort in the right groin.  Patient states that he has this symptom since last year and is slowly getting better.  Still has nagging discomfort especially when he ambulates.  Not related to micturition.  No radiation to the lower extremities or to the back.  No nocturnal symptoms.    Denies abdominal distention discomfort etc.  Bowel movements satisfactory.  He does have constipation.  No melena or hematochezia.  No diarrhea    Patient had bladder cancer diagnosed in 2023 and had right ureteral stent placed.  Also had a chemotherapy in summer 2023.    He had a colonoscopy done in 2023 and was found to have anal fissure, multiple colon polyps.  Past Medical,Family, and Social History reviewed and does contribute to the patient presenting condition.      Patient's PMH/PSH,SH,PSYCH Hx, MEDs, ALLERGIES, and ROS were all reviewed and updated in the appropriate sections. Yes      PAST MEDICAL HISTORY:  Past Medical History:   Diagnosis Date    Anxiety     Arthritis     Gilliam esophagus     RESOLVED    Basal cell carcinoma of left upper arm 02/09/2018    Bladder cancer (HCC)     started work up 6/2022, needed cardiac clearance, diagnosed with pathology 9/2022    Bleeding ulcer 1985    duodenal perforation/ bleed    Chest tightness     Colon polyps     COPD (chronic obstructive pulmonary disease) (HCC)     COVID-19 09/23/2022    sore throat x 1 day; received Paxlovid    GERD (gastroesophageal reflux disease)     Gout     RT HAND    History of blood transfusion 1985    DUODENAL BLEED/ no

## 2024-02-29 ENCOUNTER — PROCEDURE VISIT (OUTPATIENT)
Dept: UROLOGY | Age: 69
End: 2024-02-29
Payer: MEDICARE

## 2024-02-29 VITALS — TEMPERATURE: 97.2 F | DIASTOLIC BLOOD PRESSURE: 74 MMHG | SYSTOLIC BLOOD PRESSURE: 147 MMHG | HEART RATE: 69 BPM

## 2024-02-29 DIAGNOSIS — C67.8 MALIGNANT NEOPLASM OF OVERLAPPING SITES OF BLADDER (HCC): Primary | ICD-10-CM

## 2024-02-29 DIAGNOSIS — E66.01 SEVERE OBESITY (BMI 35.0-39.9) WITH COMORBIDITY (HCC): ICD-10-CM

## 2024-02-29 PROCEDURE — 52000 CYSTOURETHROSCOPY: CPT | Performed by: UROLOGY

## 2024-02-29 NOTE — PROGRESS NOTES
Cystoscopy Operative Note (2/29/24)  Surgeon: Bro Easley MD  Anesthesia: Urethral 2% Xylocaine   Indications: Bladder Cancer  Position: Dorsal Lithotomy    Findings:   Risks and Benefits discussed with patient prior to procedure.  The patient was prepped and draped in the usual sterile fashion.  The flexible cystoscope was advanced through the urethra and into the bladder.  The bladder was thoroughly inspected and the following was noted:    Residual Urine: mild  Urethra: normal appearing urethra with no masses, tenderness or lesions  Prostate: partially obstructing lateral lobes of prostate; median lobe present? no.   Bladder: small tumors post wall both sides  Ureters: Clear efflux from both ureters.  Orifices with normal configuration and location.    The cystoscope was removed.  The patient tolerated the procedure well.   Cysto bladder biopsy turbt stc, 30 min, mac    Agree with the ROS entered by the MA.

## 2024-03-05 ENCOUNTER — TELEPHONE (OUTPATIENT)
Dept: PULMONOLOGY | Age: 69
End: 2024-03-05

## 2024-03-05 NOTE — TELEPHONE ENCOUNTER
Message    Surgical preop evaluation letter in epic   Also he did not have pulmonary function test for some time.  Please schedule pulm function test on next visit.  He is okay to undergo surgery before pulmonary function test   ----- Message -----   From: Ese Meyer   Sent: 3/4/2024  11:08 AM EST   To: Frederic Plasencia MD   Subject: Edit                                              The scan below was edited by Ese Meyer on 03/04/2024 at 11:08; it is attached to the following: the 03/04/2024 Abstract with Frederic Plasencia MD        [employer] Information                      Notice:       Scan on 3/4/2024 11:08 AM by Ese Meyer: 3/4/24 Surgery Clearance Request                   Description       3/4/24 Surgery Clearance Request              Patient       George Melo              Document Type 1       Procedure/Surgery        *Surgery clearance letter faxed to Dr Easley at [phone]. PFT added to June follow up as requested.*

## 2024-03-14 NOTE — H&P (VIEW-ONLY)
HISTORY and PHYSICAL  [facility]       NAME:  George Melo  MRN: [id number]   YOB: 1955   Date: 3/18/2024   Age: 68 y.o.  Gender: male       COMPLAINT AND PRESENT HISTORY:     George Melo is 68 y.o., male, undergoing preadmission testing related to malignant neoplasm of urinary bladder with scheduled CYSTOSCOPY BLADDER BIOPSY W/FULGURATION & POSSIBLE TRANSURETHRAL RESECTION BLADDER TUMOR POSSIBLE CYSTOSCOPY TRANSURETHRAL RESECTION BLADDER TUMOR per Dr. Easley. History of bladder cancer diagnosed in May, 2022 after experiencing gross hematuria. Has been treated with BCG chemotherapy for six weeks. Pt s/p cystoscopy in urology office 02/29/24 revealing the below findings:        Residual Urine: mild  Urethra: normal appearing urethra with no masses, tenderness or lesions  Prostate: partially obstructing lateral lobes of prostate; median lobe present? no.   Bladder: small tumors post wall both sides  Ureters: Clear efflux from both ureters.  Orifices with normal configuration and location.    Denies dysuria, hematuria, frequency, urgency, sensation of incomplete emptying. Denies abdominal/pelvic pain. Denies flank pain. Denies nausea, vomiting. Denies fever or chills. Denies Hx of MRSA infection.        PMHx includes:    Sees Maddie Moreira CNP for primary care with last visit 12/01/23.  Next appt June, 2024.     COPD, Sleep apnea, SOB, chest tightness, lung nodule:   Associated medications include: spiriva, albuterol  Uses CPAP.   Pt has occasional chest tightness. Denies cough, wheezing.   Sees Dr. Plasencia for pulmonology with last visit December, 2023.     HTN, HLD, palpitations:  Associated medications include:amlodipine, simvastatin, low dose aspirin  Denies recent or current chest pain/pressure, palpitations, headaches, dizziness. Pt has lower extremity edema. BP elevated today. Has not taken BP medications today.     Sees Dr. Hull for cardiology with last visit in 2022.    BP

## 2024-03-14 NOTE — H&P
HISTORY and PHYSICAL  [facility]       NAME:  George Melo  MRN: [id number]   YOB: 1955   Date: 3/18/2024   Age: 68 y.o.  Gender: male       COMPLAINT AND PRESENT HISTORY:     George Melo is 68 y.o., male, undergoing preadmission testing related to malignant neoplasm of urinary bladder with scheduled CYSTOSCOPY BLADDER BIOPSY W/FULGURATION & POSSIBLE TRANSURETHRAL RESECTION BLADDER TUMOR POSSIBLE CYSTOSCOPY TRANSURETHRAL RESECTION BLADDER TUMOR per Dr. Easley. History of bladder cancer diagnosed in May, 2022 after experiencing gross hematuria. Has been treated with BCG chemotherapy for six weeks. Pt s/p cystoscopy in urology office 02/29/24 revealing the below findings:        Residual Urine: mild  Urethra: normal appearing urethra with no masses, tenderness or lesions  Prostate: partially obstructing lateral lobes of prostate; median lobe present? no.   Bladder: small tumors post wall both sides  Ureters: Clear efflux from both ureters.  Orifices with normal configuration and location.    Denies dysuria, hematuria, frequency, urgency, sensation of incomplete emptying. Denies abdominal/pelvic pain. Denies flank pain. Denies nausea, vomiting. Denies fever or chills. Denies Hx of MRSA infection.        PMHx includes:    Sees Maddie Moreira CNP for primary care with last visit 12/01/23.  Next appt June, 2024.     COPD, Sleep apnea, SOB, chest tightness, lung nodule:   Associated medications include: spiriva, albuterol  Uses CPAP.   Pt has occasional chest tightness. Denies cough, wheezing.   Sees Dr. Plasencia for pulmonology with last visit December, 2023.     HTN, HLD, palpitations:  Associated medications include:amlodipine, simvastatin, low dose aspirin  Denies recent or current chest pain/pressure, palpitations, headaches, dizziness. Pt has lower extremity edema. BP elevated today. Has not taken BP medications today.     Sees Dr. Hull for cardiology with last visit in 2022.    BP

## 2024-03-18 ENCOUNTER — HOSPITAL ENCOUNTER (OUTPATIENT)
Dept: PREADMISSION TESTING | Age: 69
Discharge: HOME | End: 2024-03-22
Payer: MEDICARE

## 2024-03-18 VITALS
BODY MASS INDEX: 38.5 KG/M2 | HEIGHT: 71 IN | SYSTOLIC BLOOD PRESSURE: 155 MMHG | RESPIRATION RATE: 18 BRPM | TEMPERATURE: 98.9 F | HEART RATE: 73 BPM | WEIGHT: 275 LBS | OXYGEN SATURATION: 98 % | DIASTOLIC BLOOD PRESSURE: 79 MMHG

## 2024-03-18 LAB
ANION GAP SERPL CALCULATED.3IONS-SCNC: 9 MMOL/L (ref 9–17)
BASOPHILS # BLD: 0 K/UL (ref 0–0.2)
BASOPHILS NFR BLD: 1 % (ref 0–2)
BUN SERPL-MCNC: 13 MG/DL (ref 8–23)
CALCIUM SERPL-MCNC: 9.6 MG/DL (ref 8.6–10.4)
CHLORIDE SERPL-SCNC: 100 MMOL/L (ref 98–107)
CO2 SERPL-SCNC: 29 MMOL/L (ref 20–31)
CREAT SERPL-MCNC: 1 MG/DL (ref 0.7–1.2)
EOSINOPHIL # BLD: 0.3 K/UL (ref 0–0.4)
EOSINOPHILS RELATIVE PERCENT: 3 % (ref 0–4)
ERYTHROCYTE [DISTWIDTH] IN BLOOD BY AUTOMATED COUNT: 12.6 % (ref 11.5–14.9)
GFR SERPL CREATININE-BSD FRML MDRD: >60 ML/MIN/1.73M2
GLUCOSE SERPL-MCNC: 104 MG/DL (ref 70–99)
HCT VFR BLD AUTO: 44 % (ref 41–53)
HGB BLD-MCNC: 14.9 G/DL (ref 13.5–17.5)
LYMPHOCYTES NFR BLD: 1.3 K/UL (ref 1–4.8)
LYMPHOCYTES RELATIVE PERCENT: 15 % (ref 24–44)
MCH RBC QN AUTO: 31.5 PG (ref 26–34)
MCHC RBC AUTO-ENTMCNC: 33.9 G/DL (ref 31–37)
MCV RBC AUTO: 92.8 FL (ref 80–100)
MONOCYTES NFR BLD: 0.6 K/UL (ref 0.1–1.3)
MONOCYTES NFR BLD: 7 % (ref 1–7)
NEUTROPHILS NFR BLD: 74 % (ref 36–66)
NEUTS SEG NFR BLD: 6.4 K/UL (ref 1.3–9.1)
PLATELET # BLD AUTO: 286 K/UL (ref 150–450)
PMV BLD AUTO: 7.5 FL (ref 6–12)
POTASSIUM SERPL-SCNC: 4.8 MMOL/L (ref 3.7–5.3)
RBC # BLD AUTO: 4.74 M/UL (ref 4.5–5.9)
SODIUM SERPL-SCNC: 138 MMOL/L (ref 135–144)
WBC OTHER # BLD: 8.6 K/UL (ref 3.5–11)

## 2024-03-18 PROCEDURE — 36415 COLL VENOUS BLD VENIPUNCTURE: CPT

## 2024-03-18 PROCEDURE — 93005 ELECTROCARDIOGRAM TRACING: CPT | Performed by: ANESTHESIOLOGY

## 2024-03-18 PROCEDURE — APPSS45 APP SPLIT SHARED TIME 31-45 MINUTES: Performed by: NURSE PRACTITIONER

## 2024-03-18 PROCEDURE — 85025 COMPLETE CBC W/AUTO DIFF WBC: CPT

## 2024-03-18 PROCEDURE — 87086 URINE CULTURE/COLONY COUNT: CPT

## 2024-03-18 PROCEDURE — 80048 BASIC METABOLIC PNL TOTAL CA: CPT

## 2024-03-18 RX ORDER — ASPIRIN 81 MG/1
81 TABLET ORAL DAILY
COMMUNITY

## 2024-03-18 ASSESSMENT — ENCOUNTER SYMPTOMS
NAUSEA: 0
BACK PAIN: 1
DIARRHEA: 0
CHEST TIGHTNESS: 1
COUGH: 0
VOMITING: 0
ABDOMINAL PAIN: 0
SORE THROAT: 0
CONSTIPATION: 0
SHORTNESS OF BREATH: 1
WHEEZING: 0

## 2024-03-18 NOTE — DISCHARGE INSTRUCTIONS
Pre-op Instructions For Out-Patient Surgery    Medication Instructions:  Please stop herbs and any supplements now (includes vitamins and minerals).    Please contact your surgeon and prescribing physician for pre-op instructions for any blood thinners.    If you have inhalers/aerosol treatments at home, please use them the morning of your surgery and bring the inhalers with you to the hospital.    Please take the following medications the morning of your surgery with a sip of water:    inhaler, amlodipine, prevacid    Surgery Instructions:  After midnight before surgery:  Do not eat or drink anything, including water, mints, gum, and hard candy.  You may brush your teeth without swallowing.  No smoking, chewing tobacco, or street drugs.    Please shower or bathe before surgery.  If you were given Surgical Scrub Chlorhexidine Gluconate Liquid (CHG), please shower the night before and the morning of your surgery following the detailed instructions you received during your pre-admission visit.     Please do not wear any cologne, lotion, powder, deodorant, jewelry, piercings, perfume, makeup, nail polish, hair accessories, or hair spray on the day of surgery.  Wear loose comfortable clothing.    Leave your valuables at home but bring a payment source for any after-surgery prescriptions you plan to fill at [facility].  Bring a storage case for any glasses/contacts.    An adult who is responsible for you MUST drive you home and should be with you for the first 24 hours after surgery.     If having out-patient knee and foot surgeries, please arrange for planned crutches, walker, or wheelchair before arriving to the hospital.    The Day of Surgery:  Arrive at [facility] Surgery Entrance at the time directed by your surgeon and check in at the desk.     If you have a living will or healthcare power of , please bring a copy.    You will be taken to the pre-op

## 2024-03-19 LAB
EKG ATRIAL RATE: 70 BPM
EKG P AXIS: 45 DEGREES
EKG P-R INTERVAL: 166 MS
EKG Q-T INTERVAL: 398 MS
EKG QRS DURATION: 100 MS
EKG QTC CALCULATION (BAZETT): 429 MS
EKG R AXIS: 49 DEGREES
EKG T AXIS: 49 DEGREES
EKG VENTRICULAR RATE: 70 BPM
MICROORGANISM SPEC CULT: NORMAL
SPECIMEN DESCRIPTION: NORMAL

## 2024-03-19 PROCEDURE — 93010 ELECTROCARDIOGRAM REPORT: CPT | Performed by: INTERNAL MEDICINE

## 2024-03-27 ENCOUNTER — ANESTHESIA EVENT (OUTPATIENT)
Dept: OPERATING ROOM | Age: 69
End: 2024-03-27
Payer: MEDICARE

## 2024-03-27 NOTE — PRE-PROCEDURE INSTRUCTIONS
Nothing to eat after midnight.  Are you taking any blood thinners? When was the last day?  Make sure to use Hibiclens prior to surgery.  Remove any jewelry and body piercings.  Do you wear glasses? If so, please bring a case to store them in.  Are you having any Covid symptoms?  Do you have any new rashes, infections, etc. that we should be aware of?  Do you have a ride home the day of surgery? It cannot be a cab or medical transportation.  Verify surgery time and what time to arrive at hospital.   Left message regarding arrival time, procedure time, npo status at midnight, need for , pre op phone number for any questions.

## 2024-03-28 ENCOUNTER — HOSPITAL ENCOUNTER (OUTPATIENT)
Age: 69
Setting detail: OUTPATIENT SURGERY
Discharge: HOME | End: 2024-03-28
Attending: UROLOGY | Admitting: UROLOGY
Payer: MEDICARE

## 2024-03-28 ENCOUNTER — ANESTHESIA (OUTPATIENT)
Dept: OPERATING ROOM | Age: 69
End: 2024-03-28
Payer: MEDICARE

## 2024-03-28 VITALS
HEIGHT: 71 IN | SYSTOLIC BLOOD PRESSURE: 163 MMHG | TEMPERATURE: 97.1 F | RESPIRATION RATE: 16 BRPM | BODY MASS INDEX: 38.5 KG/M2 | OXYGEN SATURATION: 100 % | WEIGHT: 275 LBS | HEART RATE: 58 BPM | DIASTOLIC BLOOD PRESSURE: 87 MMHG

## 2024-03-28 DIAGNOSIS — C67.9 MALIGNANT NEOPLASM OF URINARY BLADDER, UNSPECIFIED SITE (HCC): ICD-10-CM

## 2024-03-28 PROCEDURE — 7100000030 HC ASPR PHASE II RECOVERY - FIRST 15 MIN: Performed by: UROLOGY

## 2024-03-28 PROCEDURE — 6360000002 HC RX W HCPCS: Performed by: UROLOGY

## 2024-03-28 PROCEDURE — 7100000011 HC PHASE II RECOVERY - ADDTL 15 MIN: Performed by: UROLOGY

## 2024-03-28 PROCEDURE — 7100000001 HC PACU RECOVERY - ADDTL 15 MIN: Performed by: UROLOGY

## 2024-03-28 PROCEDURE — 3700000001 HC ADD 15 MINUTES (ANESTHESIA): Performed by: UROLOGY

## 2024-03-28 PROCEDURE — 3600000012 HC SURGERY LEVEL 2 ADDTL 15MIN: Performed by: UROLOGY

## 2024-03-28 PROCEDURE — 3600000002 HC SURGERY LEVEL 2 BASE: Performed by: UROLOGY

## 2024-03-28 PROCEDURE — 7100000010 HC PHASE II RECOVERY - FIRST 15 MIN: Performed by: UROLOGY

## 2024-03-28 PROCEDURE — 3700000000 HC ANESTHESIA ATTENDED CARE: Performed by: UROLOGY

## 2024-03-28 PROCEDURE — 88305 TISSUE EXAM BY PATHOLOGIST: CPT

## 2024-03-28 PROCEDURE — 6360000002 HC RX W HCPCS: Performed by: NURSE ANESTHETIST, CERTIFIED REGISTERED

## 2024-03-28 PROCEDURE — 2500000003 HC RX 250 WO HCPCS: Performed by: NURSE ANESTHETIST, CERTIFIED REGISTERED

## 2024-03-28 PROCEDURE — 2500000003 HC RX 250 WO HCPCS: Performed by: ANESTHESIOLOGY

## 2024-03-28 PROCEDURE — 6370000000 HC RX 637 (ALT 250 FOR IP): Performed by: ANESTHESIOLOGY

## 2024-03-28 PROCEDURE — 2709999900 HC NON-CHARGEABLE SUPPLY: Performed by: UROLOGY

## 2024-03-28 PROCEDURE — 7100000031 HC ASPR PHASE II RECOVERY - ADDTL 15 MIN: Performed by: UROLOGY

## 2024-03-28 PROCEDURE — 2580000003 HC RX 258: Performed by: ANESTHESIOLOGY

## 2024-03-28 PROCEDURE — 7100000000 HC PACU RECOVERY - FIRST 15 MIN: Performed by: UROLOGY

## 2024-03-28 RX ORDER — LIDOCAINE HYDROCHLORIDE 20 MG/ML
INJECTION, SOLUTION EPIDURAL; INFILTRATION; INTRACAUDAL; PERINEURAL PRN
Status: DISCONTINUED | OUTPATIENT
Start: 2024-03-28 | End: 2024-03-28 | Stop reason: SDUPTHER

## 2024-03-28 RX ORDER — ROCURONIUM BROMIDE 10 MG/ML
INJECTION, SOLUTION INTRAVENOUS PRN
Status: DISCONTINUED | OUTPATIENT
Start: 2024-03-28 | End: 2024-03-28 | Stop reason: SDUPTHER

## 2024-03-28 RX ORDER — HYDROCODONE BITARTRATE AND ACETAMINOPHEN 5; 325 MG/1; MG/1
1 TABLET ORAL EVERY 6 HOURS PRN
Status: DISCONTINUED | OUTPATIENT
Start: 2024-03-28 | End: 2024-03-28 | Stop reason: HOSPADM

## 2024-03-28 RX ORDER — FENTANYL CITRATE 50 UG/ML
INJECTION, SOLUTION INTRAMUSCULAR; INTRAVENOUS PRN
Status: DISCONTINUED | OUTPATIENT
Start: 2024-03-28 | End: 2024-03-28 | Stop reason: SDUPTHER

## 2024-03-28 RX ORDER — LIDOCAINE HYDROCHLORIDE 10 MG/ML
1 INJECTION, SOLUTION EPIDURAL; INFILTRATION; INTRACAUDAL; PERINEURAL
Status: COMPLETED | OUTPATIENT
Start: 2024-03-28 | End: 2024-03-28

## 2024-03-28 RX ORDER — HYDRALAZINE HYDROCHLORIDE 20 MG/ML
10 INJECTION INTRAMUSCULAR; INTRAVENOUS
Status: DISCONTINUED | OUTPATIENT
Start: 2024-03-28 | End: 2024-03-28 | Stop reason: HOSPADM

## 2024-03-28 RX ORDER — SODIUM CHLORIDE 0.9 % (FLUSH) 0.9 %
5-40 SYRINGE (ML) INJECTION EVERY 12 HOURS SCHEDULED
Status: DISCONTINUED | OUTPATIENT
Start: 2024-03-28 | End: 2024-03-28 | Stop reason: HOSPADM

## 2024-03-28 RX ORDER — SODIUM CHLORIDE 9 MG/ML
INJECTION, SOLUTION INTRAVENOUS PRN
Status: DISCONTINUED | OUTPATIENT
Start: 2024-03-28 | End: 2024-03-28 | Stop reason: HOSPADM

## 2024-03-28 RX ORDER — ONDANSETRON 2 MG/ML
INJECTION INTRAMUSCULAR; INTRAVENOUS PRN
Status: DISCONTINUED | OUTPATIENT
Start: 2024-03-28 | End: 2024-03-28 | Stop reason: SDUPTHER

## 2024-03-28 RX ORDER — SODIUM CHLORIDE 0.9 % (FLUSH) 0.9 %
5-40 SYRINGE (ML) INJECTION PRN
Status: DISCONTINUED | OUTPATIENT
Start: 2024-03-28 | End: 2024-03-28 | Stop reason: HOSPADM

## 2024-03-28 RX ORDER — PROPOFOL 10 MG/ML
INJECTION, EMULSION INTRAVENOUS PRN
Status: DISCONTINUED | OUTPATIENT
Start: 2024-03-28 | End: 2024-03-28 | Stop reason: SDUPTHER

## 2024-03-28 RX ORDER — SODIUM CHLORIDE 9 MG/ML
INJECTION, SOLUTION INTRAVENOUS CONTINUOUS
Status: DISCONTINUED | OUTPATIENT
Start: 2024-03-28 | End: 2024-03-28 | Stop reason: HOSPADM

## 2024-03-28 RX ORDER — MEPERIDINE HYDROCHLORIDE 25 MG/ML
12.5 INJECTION INTRAMUSCULAR; INTRAVENOUS; SUBCUTANEOUS EVERY 5 MIN PRN
Status: DISCONTINUED | OUTPATIENT
Start: 2024-03-28 | End: 2024-03-28 | Stop reason: HOSPADM

## 2024-03-28 RX ORDER — METOCLOPRAMIDE HYDROCHLORIDE 5 MG/ML
10 INJECTION INTRAMUSCULAR; INTRAVENOUS
Status: DISCONTINUED | OUTPATIENT
Start: 2024-03-28 | End: 2024-03-28 | Stop reason: HOSPADM

## 2024-03-28 RX ORDER — LABETALOL HYDROCHLORIDE 5 MG/ML
10 INJECTION, SOLUTION INTRAVENOUS
Status: DISCONTINUED | OUTPATIENT
Start: 2024-03-28 | End: 2024-03-28 | Stop reason: HOSPADM

## 2024-03-28 RX ORDER — DIPHENHYDRAMINE HYDROCHLORIDE 50 MG/ML
12.5 INJECTION INTRAMUSCULAR; INTRAVENOUS
Status: DISCONTINUED | OUTPATIENT
Start: 2024-03-28 | End: 2024-03-28 | Stop reason: HOSPADM

## 2024-03-28 RX ORDER — ACETAMINOPHEN 500 MG
1000 TABLET ORAL ONCE
Status: COMPLETED | OUTPATIENT
Start: 2024-03-28 | End: 2024-03-28

## 2024-03-28 RX ORDER — FENTANYL CITRATE 0.05 MG/ML
25 INJECTION, SOLUTION INTRAMUSCULAR; INTRAVENOUS EVERY 5 MIN PRN
Status: DISCONTINUED | OUTPATIENT
Start: 2024-03-28 | End: 2024-03-28 | Stop reason: HOSPADM

## 2024-03-28 RX ORDER — DEXAMETHASONE SODIUM PHOSPHATE 4 MG/ML
INJECTION, SOLUTION INTRA-ARTICULAR; INTRALESIONAL; INTRAMUSCULAR; INTRAVENOUS; SOFT TISSUE PRN
Status: DISCONTINUED | OUTPATIENT
Start: 2024-03-28 | End: 2024-03-28 | Stop reason: SDUPTHER

## 2024-03-28 RX ORDER — ONDANSETRON 2 MG/ML
4 INJECTION INTRAMUSCULAR; INTRAVENOUS
Status: DISCONTINUED | OUTPATIENT
Start: 2024-03-28 | End: 2024-03-28 | Stop reason: HOSPADM

## 2024-03-28 RX ORDER — NALOXONE HYDROCHLORIDE 0.4 MG/ML
INJECTION, SOLUTION INTRAMUSCULAR; INTRAVENOUS; SUBCUTANEOUS PRN
Status: DISCONTINUED | OUTPATIENT
Start: 2024-03-28 | End: 2024-03-28 | Stop reason: HOSPADM

## 2024-03-28 RX ORDER — GABAPENTIN 100 MG/1
100 CAPSULE ORAL ONCE
Status: COMPLETED | OUTPATIENT
Start: 2024-03-28 | End: 2024-03-28

## 2024-03-28 RX ORDER — ACETAMINOPHEN 325 MG/1
650 TABLET ORAL
Status: DISCONTINUED | OUTPATIENT
Start: 2024-03-28 | End: 2024-03-28 | Stop reason: HOSPADM

## 2024-03-28 RX ADMIN — LIDOCAINE HYDROCHLORIDE 80 MG: 20 INJECTION, SOLUTION EPIDURAL; INFILTRATION; INTRACAUDAL; PERINEURAL at 11:18

## 2024-03-28 RX ADMIN — LIDOCAINE HYDROCHLORIDE 1 ML: 10 INJECTION, SOLUTION EPIDURAL; INFILTRATION; INTRACAUDAL; PERINEURAL at 10:36

## 2024-03-28 RX ADMIN — ROCURONIUM BROMIDE 50 MG: 10 INJECTION, SOLUTION INTRAVENOUS at 11:19

## 2024-03-28 RX ADMIN — SUGAMMADEX 400 MG: 100 INJECTION, SOLUTION INTRAVENOUS at 11:41

## 2024-03-28 RX ADMIN — SODIUM CHLORIDE: 9 INJECTION, SOLUTION INTRAVENOUS at 10:36

## 2024-03-28 RX ADMIN — ONDANSETRON 4 MG: 2 INJECTION INTRAMUSCULAR; INTRAVENOUS at 11:34

## 2024-03-28 RX ADMIN — FENTANYL CITRATE 25 MCG: 50 INJECTION, SOLUTION INTRAMUSCULAR; INTRAVENOUS at 11:35

## 2024-03-28 RX ADMIN — GABAPENTIN 100 MG: 100 CAPSULE ORAL at 10:25

## 2024-03-28 RX ADMIN — ACETAMINOPHEN 1000 MG: 500 TABLET ORAL at 10:25

## 2024-03-28 RX ADMIN — DEXAMETHASONE SODIUM PHOSPHATE 8 MG: 4 INJECTION INTRA-ARTICULAR; INTRALESIONAL; INTRAMUSCULAR; INTRAVENOUS; SOFT TISSUE at 11:24

## 2024-03-28 RX ADMIN — FENTANYL CITRATE 25 MCG: 50 INJECTION, SOLUTION INTRAMUSCULAR; INTRAVENOUS at 11:18

## 2024-03-28 RX ADMIN — PROPOFOL 180 MG: 10 INJECTION, EMULSION INTRAVENOUS at 11:18

## 2024-03-28 RX ADMIN — Medication 2000 MG: at 11:25

## 2024-03-28 ASSESSMENT — PAIN - FUNCTIONAL ASSESSMENT
PAIN_FUNCTIONAL_ASSESSMENT: NONE - DENIES PAIN
PAIN_FUNCTIONAL_ASSESSMENT: 0-10

## 2024-03-28 ASSESSMENT — ENCOUNTER SYMPTOMS: SHORTNESS OF BREATH: 1

## 2024-03-28 NOTE — INTERVAL H&P NOTE
Update History & Physical    The patient's History and Physical of March 18, 2024 was reviewed with the patient and I examined the patient. Any changes are as documented below. Here today for CYSTOSCOPY BLADDER BIOPSY W/FULGURATION & POSSIBLE TRANSURETHRAL RESECTION BLADDER TUMOR per Dr. Easlye.     Pt AAO x 3 in NAD. HRRR. No adventitious lung sounds. No respiratory distress. NPO p MN. Took Tart cherry, probiotic and prevacid this am with sip of water. Stopped aspirin one week ago. Denies taking any other blood thinning medications. Denies recent or current chest pain/pressure, palpitations, SOB, recent URI, fever or chills. Review vitals per RN flowsheet.       Electronically signed by VIKY Delcid CNP on 3/28/2024 at 10:10 AM

## 2024-03-28 NOTE — BRIEF OP NOTE
Brief Postoperative Note      Patient: George Melo  YOB: 1955  MRN: [id number]    Date of Procedure: 3/28/2024    Pre-Op Diagnosis Codes:     * Malignant neoplasm of urinary bladder, unspecified site (HCC) [C67.9]    Post-Op Diagnosis: Same       Procedure(s):  CYSTOSCOPY BLADDER BIOPSY W/FULGURATION & POSSIBLE TRANSURETHRAL RESECTION BLADDER TUMOR  POSSIBLE CYSTOSCOPY TRANSURETHRAL RESECTION BLADDER TUMOR    Surgeon(s):  Bro Easley MD    Assistant:  * No surgical staff found *    Anesthesia: Monitor Anesthesia Care    Estimated Blood Loss (mL): Minimal    Complications: None    Specimens:   * No specimens in log *    Implants:  * No implants in log *      Drains: * No LDAs found *    Findings: d      Electronically signed by Bro Easley MD on 3/28/2024 at 10:55 AM

## 2024-03-28 NOTE — ANESTHESIA POSTPROCEDURE EVALUATION
Department of Anesthesiology  Postprocedure Note    Patient: George Melo  MRN: [id number]  YOB: 1955  Date of evaluation: 3/28/2024    Procedure Summary       Date: 03/28/24 Room / Location: [place] [address] / [facility]    Anesthesia Start: 1116 Anesthesia Stop: 1154    Procedure: CYSTOSCOPY BLADDER BIOPSY W/FULGURATION (Bladder) Diagnosis:       Malignant neoplasm of urinary bladder, unspecified site (HCC)      (Malignant neoplasm of urinary bladder, unspecified site (HCC) [C67.9])    Surgeons: Bro Easley MD Responsible Provider: Luis Barnett MD    Anesthesia Type: General ASA Status: 3            Anesthesia Type: General    Ann Marie Phase I: Ann Marie Score: 10    Ann Marie Phase II: Ann Marie Score: 10    Anesthesia Post Evaluation    Comments: POST- ANESTHESIA EVALUATION       Pt Name: George Melo  MRN: [id number]  YOB: 1955  Date of evaluation: 3/28/2024  Time:  1:11 PM      BP (!) 163/87   Pulse 58   Temp 97.1 °F (36.2 °C) (Infrared)   Resp 16   Ht 1.803 m (5' 11\")   Wt 124.7 kg (275 lb)   SpO2 100%   BMI 38.35 kg/m²      Consciousness Level  Awake  Cardiopulmonary Status  Stable  Pain Adequately Treated YES  Nausea / Vomiting  NO  Adequate Hydration  YES  Anesthesia Related Complications NONE      Electronically signed by Fabi Carroll MD on 3/28/2024 at 1:11 PM      No notable events documented.

## 2024-03-28 NOTE — OP NOTE
[id number] [place] [address]                            OPERATIVE REPORT      PATIENT NAME: SUNDAR PATINO               : 1955  MED REC NO: [id number]                          ROOM: [facility]  ACCOUNT NO: [id number]                       ADMIT DATE: 2024  PROVIDER: Bro Easley MD      DATE OF PROCEDURE:  2024    SURGEON:  Bro Easley MD    ASSISTANT:  None.    PREOPERATIVE DIAGNOSES:  Bladder cancer, bladder lesions.    POSTOPERATIVE DIAGNOSES:  Bladder cancer, bladder lesions.    PROCEDURES:  Cystoscopy, bladder biopsy and fulguration.    ANESTHESIA:  General.    COMPLICATIONS:  None.    ESTIMATED BLOOD LOSS:  Minimal.    INDICATIONS FOR PROCEDURE:  This is a 68-year-old white male who has a history of bladder cancer.  On recent surveillance cystoscopy, he was found to have a few small lesions in his bladder.  Thus, the above procedure was scheduled.  All the risks and benefits were explained to the patient.  Informed consent was obtained.    DESCRIPTION OF PROCEDURE:  This 68-year-old white male was brought back to the operating room, positioned in the modified dorsal lithotomy position after general anesthesia was started.  He was sterilely prepped and draped in standard fashion.  A 22-[place] Islander cystoscope was inserted into urethra and advanced into the bladder.  The bladder was examined.  Some small lesions were noted in the posterior wall and the dome of the bladder and no other lesions after this were identified.  Both ureteral orifices were normal.  I then advanced cold cup biopsy forceps and took approximately 5 biopsies of these areas.  These were sent as permanent specimens to Pathology.  I then used a Bugbee fulgurator and fulgurated all of these areas entirely and I examined the rest of the bladder.  No other lesions were noted.  Then, I made sure again I fulgurated all of the areas that were suspicious.  I

## 2024-03-28 NOTE — ANESTHESIA PRE PROCEDURE
breath sounds clear to auscultation  (+)  COPD:  shortness of breath:   sleep apnea:                                  Cardiovascular:  Exercise tolerance: good (>4 METS)  (+) hypertension:      ECG reviewed  Rhythm: regular  Rate: normal    Stress test reviewed                Neuro/Psych:   Negative Neuro/Psych ROS              GI/Hepatic/Renal:   (+) GERD:          Endo/Other: Negative Endo/Other ROS                    Abdominal:             Vascular: negative vascular ROS.         Other Findings: Lower partial      Anesthesia Plan      general     ASA 3       Induction: intravenous.    MIPS: Postoperative opioids intended and Prophylactic antiemetics administered.  Anesthetic plan and risks discussed with patient.      Plan discussed with CRNA.                Luis Barnett MD   3/28/2024

## 2024-04-01 LAB — SURGICAL PATHOLOGY REPORT: NORMAL

## 2024-04-10 ENCOUNTER — TELEPHONE (OUTPATIENT)
Dept: ONCOLOGY | Age: 69
End: 2024-04-10

## 2024-04-11 ENCOUNTER — OFFICE VISIT (OUTPATIENT)
Dept: UROLOGY | Age: 69
End: 2024-04-11
Payer: MEDICARE

## 2024-04-11 VITALS
HEART RATE: 67 BPM | OXYGEN SATURATION: 94 % | WEIGHT: 288 LBS | BODY MASS INDEX: 40.32 KG/M2 | DIASTOLIC BLOOD PRESSURE: 73 MMHG | TEMPERATURE: 98.5 F | HEIGHT: 71 IN | SYSTOLIC BLOOD PRESSURE: 141 MMHG

## 2024-04-11 DIAGNOSIS — C67.8 MALIGNANT NEOPLASM OF OVERLAPPING SITES OF BLADDER (HCC): Primary | ICD-10-CM

## 2024-04-11 PROCEDURE — 1123F ACP DISCUSS/DSCN MKR DOCD: CPT | Performed by: UROLOGY

## 2024-04-11 PROCEDURE — 3078F DIAST BP <80 MM HG: CPT | Performed by: UROLOGY

## 2024-04-11 PROCEDURE — 99214 OFFICE O/P EST MOD 30 MIN: CPT | Performed by: UROLOGY

## 2024-04-11 PROCEDURE — 3077F SYST BP >= 140 MM HG: CPT | Performed by: UROLOGY

## 2024-04-11 ASSESSMENT — ENCOUNTER SYMPTOMS
ABDOMINAL PAIN: 0
SHORTNESS OF BREATH: 0
VOMITING: 0
EYE PAIN: 0
WHEEZING: 0
NAUSEA: 0
BACK PAIN: 0
CONSTIPATION: 0
DIARRHEA: 0
EYE REDNESS: 0
COUGH: 0

## 2024-04-11 NOTE — PROGRESS NOTES
Review of Systems   Constitutional:  Negative for chills, fatigue and fever.   Eyes:  Negative for pain, redness and visual disturbance.   Respiratory:  Negative for cough, shortness of breath and wheezing.    Cardiovascular:  Negative for chest pain and leg swelling.   Gastrointestinal:  Negative for abdominal pain, constipation, diarrhea, nausea and vomiting.   Genitourinary:  Negative for difficulty urinating, dysuria, flank pain, frequency, hematuria, scrotal swelling, testicular pain and urgency.   Musculoskeletal:  Negative for back pain, joint swelling and myalgias.   Skin:  Negative for rash and wound.   Neurological:  Negative for dizziness, tremors, weakness and numbness.   Hematological:  Does not bruise/bleed easily.     
g 3    fluticasone (FLONASE) 50 MCG/ACT nasal spray USE 2 SPRAYS NASALLY DAILY 48 g 3    Hyoscyamine Sulfate SL (LEVSIN/SL) 0.125 MG SUBL Place 0.125 mg under the tongue every 4 hours as needed (bladder spasms) 30 each 0    acyclovir (ZOVIRAX) 800 MG tablet Si tablet in the am and 1 in the pm for each cold sore, may repeat as needed. (total of 2 tablets for each outbreak) 20 tablet 0    TART CHERRY PO Take 1 capsule by mouth daily      vitamin D3 (CHOLECALCIFEROL) 25 MCG (1000 UT) TABS tablet Take 1 tablet by mouth daily 90 tablet 3       Compazine spansule [prochlorperazine], Metoclopramide, and Seasonal  Social History     Tobacco Use   Smoking Status Former    Current packs/day: 0.00    Average packs/day: 1 pack/day for 45.0 years (45.0 ttl pk-yrs)    Types: Cigarettes    Start date:     Quit date: 2014    Years since quittin.3    Passive exposure: Never   Smokeless Tobacco Never     (Ifpatient a smoker, smoking cessation counseling offered)    Social History     Substance and Sexual Activity   Alcohol Use Yes    Alcohol/week: 42.0 standard drinks of alcohol    Types: 42 Cans of beer per week    Comment: 6 cans beer/day       REVIEW OF SYSTEMS:  Review of Systems    Physical Exam:      Vitals:    24 1146   BP: (!) 141/73   Pulse: 67   Temp: 98.5 °F (36.9 °C)   SpO2: 94%     Body mass index is 40.19 kg/m².  Patient is a 68 y.o. male in no acute distress and alert and oriented to person, place and time.  Physical Exam  Constitutional: Patient in no acute distress.  Neuro: Alert and oriented to person, place and time.  Psych: Mood normal, affect normal  Skin: No rash noted  HEENT: Head: Normocephalic andatraumatic  Conjunctivae and EOM are normal. Pupils are equal, round  Nose:Normal  Right External Ear: Normal; Left External Ear: Normal  Mouth: Mucosa Moist  Neck: Supple  Lungs: Respiratory effort is normal  Cardiovascular: Warm & Pink  Abdomen: Soft, non-tender, non-distended with no CVA,

## 2024-04-22 ENCOUNTER — HOSPITAL ENCOUNTER (OUTPATIENT)
Dept: CT IMAGING | Age: 69
Discharge: HOME | End: 2024-04-24
Attending: INTERNAL MEDICINE
Payer: MEDICARE

## 2024-04-22 VITALS — BODY MASS INDEX: 39.2 KG/M2 | WEIGHT: 280 LBS | HEIGHT: 71 IN

## 2024-04-22 DIAGNOSIS — Z87.891 PERSONAL HISTORY OF TOBACCO USE: ICD-10-CM

## 2024-04-22 PROCEDURE — 71271 CT THORAX LUNG CANCER SCR C-: CPT

## 2024-06-04 ENCOUNTER — OFFICE VISIT (OUTPATIENT)
Dept: PULMONOLOGY | Age: 69
End: 2024-06-04
Payer: MEDICARE

## 2024-06-04 VITALS
HEART RATE: 85 BPM | WEIGHT: 288 LBS | HEIGHT: 71 IN | OXYGEN SATURATION: 97 % | DIASTOLIC BLOOD PRESSURE: 79 MMHG | RESPIRATION RATE: 16 BRPM | SYSTOLIC BLOOD PRESSURE: 144 MMHG | BODY MASS INDEX: 40.32 KG/M2 | TEMPERATURE: 97.3 F

## 2024-06-04 DIAGNOSIS — E66.01 SEVERE OBESITY (BMI 35.0-39.9) WITH COMORBIDITY (HCC): ICD-10-CM

## 2024-06-04 DIAGNOSIS — R91.1 LUNG NODULE: ICD-10-CM

## 2024-06-04 DIAGNOSIS — J44.9 CHRONIC OBSTRUCTIVE PULMONARY DISEASE, UNSPECIFIED COPD TYPE (HCC): Primary | ICD-10-CM

## 2024-06-04 DIAGNOSIS — G47.33 OSA (OBSTRUCTIVE SLEEP APNEA): ICD-10-CM

## 2024-06-04 DIAGNOSIS — Z87.891 HISTORY OF SMOKING AT LEAST 1 PACK PER DAY FOR AT LEAST 30 YEARS: ICD-10-CM

## 2024-06-04 PROCEDURE — 3078F DIAST BP <80 MM HG: CPT | Performed by: INTERNAL MEDICINE

## 2024-06-04 PROCEDURE — 3077F SYST BP >= 140 MM HG: CPT | Performed by: INTERNAL MEDICINE

## 2024-06-04 PROCEDURE — 94375 RESPIRATORY FLOW VOLUME LOOP: CPT | Performed by: INTERNAL MEDICINE

## 2024-06-04 PROCEDURE — 94729 DIFFUSING CAPACITY: CPT | Performed by: INTERNAL MEDICINE

## 2024-06-04 PROCEDURE — 94726 PLETHYSMOGRAPHY LUNG VOLUMES: CPT | Performed by: INTERNAL MEDICINE

## 2024-06-04 PROCEDURE — 99214 OFFICE O/P EST MOD 30 MIN: CPT | Performed by: INTERNAL MEDICINE

## 2024-06-04 PROCEDURE — 1123F ACP DISCUSS/DSCN MKR DOCD: CPT | Performed by: INTERNAL MEDICINE

## 2024-06-04 RX ORDER — ALBUTEROL SULFATE 90 UG/1
2 AEROSOL, METERED RESPIRATORY (INHALATION) EVERY 6 HOURS PRN
Qty: 3 EACH | Refills: 3 | Status: SHIPPED | OUTPATIENT
Start: 2024-06-04 | End: 2024-09-02

## 2024-06-04 NOTE — PROGRESS NOTES
Patient performed PFT with good effort and understanding   
Pulmonary function test  Date of study 06/04/24.      Spirometry shows FVC is 3.07 65% predicted.  FEV1 is 2.11 57% predicted consistent with moderate obstructive ventilatory impairment flow volume loop and FEV1 FVC ratio is consistent with obstructive ventilatory impairment.  Lung volumes shows residual volume is 3.90 141% predicted consistent with mild to moderate airway trapping.  Total lung capacity 7.97 111% predicted with mild airway trapping.  Diffusion capacity is 21.87 85% predicted which is within normal limit.      Impression:      This pulmonary function test is consistent with moderate obstructive ventilatory impairment no bronchodilator response was done.  There is mild to moderate airway trapping present.  Normal diffusion capacity.  Clinical correlation is recommended.        
disease.  Atherosclerotic   calcification of the aorta.       09/24/18  1. Stable appearance of benign-appearing pleural-based lung nodules.  Given   their stability and association with the pleura, findings likely reflect   benign etiologies such as intrapulmonary lymph nodes.  No further follow-up   is suggested.   2. No new or suspicious lung nodules and no lymphadenopathy.       03/26/018  Nonspecific 7-8 mm pleural-based nodular density posterior right upper   lobe/apex on series 2, image 66.  5 mm nodule in the superior right middle   lobe along the fissure as well as 5 mm nodule in the superior left lower lobe   along the fissure both of which likely represent benign intrapulmonary lymph   nodes.         EKG:   ECHO:   Stress Test:     Assessment and Plan       ICD-10-CM    1. Chronic obstructive pulmonary disease, unspecified COPD type ([facility])  J44.9 CT RESPIRATORY FLOW VOLUME LOOP     CT CO DIFFUSING CAPACITY     CT PLETHYSMOGRAPHY LUNG VOLUMES W/WO AIRWAY RESIST      2. DEIRDRE (obstructive sleep apnea)  G47.33       3. Lung nodule  R91.1       4. History of smoking at least 1 pack per day for at least 30 years  Z87.891       5. Severe obesity (BMI 35.0-39.9) with comorbidity (HCC)  E66.01           Assessment:    Low-dose CT scan of the chest done on 04/22/2024 and as compared to CT scan on 04/20/2023 the right lung nodule 5 to 6 mm is stable without much change.  CT scan of the chest done on 04/19/2023 on review of the CT scan there was no new changes the lung nodule anteriorly in upper/middle lobe is the same as it was before in the right upper on CT scan on 04/22/2022.  Pleuroparenchymal nodule or thickening is the same as it was before without much change.Low-dose CT of the chest was done on 04/22/2022 Showed stable right middle lobe nodule and left fissure nodule pleural-based upper lung nodule with pleural thickening there is stable without much change as compared to CT scan of the chest on 04/05/2021.

## 2024-06-07 ENCOUNTER — HOSPITAL ENCOUNTER (OUTPATIENT)
Age: 69
Setting detail: SPECIMEN
Discharge: HOME | End: 2024-06-07

## 2024-06-07 DIAGNOSIS — E78.00 PURE HYPERCHOLESTEROLEMIA: ICD-10-CM

## 2024-06-07 DIAGNOSIS — M1A.0410 CHRONIC GOUT OF RIGHT HAND, UNSPECIFIED CAUSE: ICD-10-CM

## 2024-06-07 DIAGNOSIS — I10 ESSENTIAL HYPERTENSION: ICD-10-CM

## 2024-06-07 DIAGNOSIS — R73.9 HYPERGLYCEMIA: ICD-10-CM

## 2024-06-07 LAB
ALBUMIN SERPL-MCNC: 4.3 G/DL (ref 3.5–5.2)
ALBUMIN/GLOB SERPL: 2 {RATIO} (ref 1–2.5)
ALP SERPL-CCNC: 78 U/L (ref 40–129)
ALT SERPL-CCNC: 17 U/L (ref 10–50)
ANION GAP SERPL CALCULATED.3IONS-SCNC: 13 MMOL/L (ref 9–16)
AST SERPL-CCNC: 26 U/L (ref 10–50)
BILIRUB SERPL-MCNC: 0.7 MG/DL (ref 0–1.2)
BUN SERPL-MCNC: 14 MG/DL (ref 8–23)
CALCIUM SERPL-MCNC: 9.5 MG/DL (ref 8.6–10.4)
CHLORIDE SERPL-SCNC: 101 MMOL/L (ref 98–107)
CHOLEST SERPL-MCNC: 178 MG/DL (ref 0–199)
CHOLESTEROL/HDL RATIO: 3
CO2 SERPL-SCNC: 23 MMOL/L (ref 20–31)
CREAT SERPL-MCNC: 1.1 MG/DL (ref 0.7–1.2)
ERYTHROCYTE [DISTWIDTH] IN BLOOD BY AUTOMATED COUNT: 12.7 % (ref 11.8–14.4)
EST. AVERAGE GLUCOSE BLD GHB EST-MCNC: 97 MG/DL
GFR, ESTIMATED: 71 ML/MIN/1.73M2
GLUCOSE SERPL-MCNC: 98 MG/DL (ref 74–99)
HBA1C MFR BLD: 5 % (ref 4–6)
HCT VFR BLD AUTO: 47 % (ref 40.7–50.3)
HDLC SERPL-MCNC: 57 MG/DL
HGB BLD-MCNC: 15.2 G/DL (ref 13–17)
LDLC SERPL CALC-MCNC: 90 MG/DL (ref 0–100)
MCH RBC QN AUTO: 31.3 PG (ref 25.2–33.5)
MCHC RBC AUTO-ENTMCNC: 32.3 G/DL (ref 28.4–34.8)
MCV RBC AUTO: 96.7 FL (ref 82.6–102.9)
NRBC BLD-RTO: 0 PER 100 WBC
PLATELET # BLD AUTO: 233 K/UL (ref 138–453)
PMV BLD AUTO: 9.5 FL (ref 8.1–13.5)
POTASSIUM SERPL-SCNC: 5.2 MMOL/L (ref 3.7–5.3)
PROT SERPL-MCNC: 7.1 G/DL (ref 6.6–8.7)
RBC # BLD AUTO: 4.86 M/UL (ref 4.21–5.77)
SODIUM SERPL-SCNC: 137 MMOL/L (ref 136–145)
TRIGL SERPL-MCNC: 154 MG/DL
URATE SERPL-MCNC: 8.1 MG/DL (ref 3.4–7)
VLDLC SERPL CALC-MCNC: 31 MG/DL
WBC OTHER # BLD: 8.6 K/UL (ref 3.5–11.3)

## 2024-06-11 PROBLEM — M79.89 LOCALIZED SWELLING OF LOWER EXTREMITY: Status: RESOLVED | Noted: 2019-06-13 | Resolved: 2024-06-11

## 2024-06-11 PROBLEM — E66.1 CLASS 2 DRUG-INDUCED OBESITY WITH SERIOUS COMORBIDITY AND BODY MASS INDEX (BMI) OF 38.0 TO 38.9 IN ADULT: Status: RESOLVED | Noted: 2019-06-13 | Resolved: 2024-06-11

## 2024-06-11 PROBLEM — M1A.0410 IDIOPATHIC CHRONIC GOUT OF RIGHT HAND WITHOUT TOPHUS: Status: ACTIVE | Noted: 2024-06-11

## 2024-06-11 PROBLEM — Z97.4 WEARS HEARING AID IN BOTH EARS: Status: ACTIVE | Noted: 2024-06-11

## 2024-06-11 PROBLEM — E66.812 CLASS 2 DRUG-INDUCED OBESITY WITH SERIOUS COMORBIDITY AND BODY MASS INDEX (BMI) OF 38.0 TO 38.9 IN ADULT: Status: RESOLVED | Noted: 2019-06-13 | Resolved: 2024-06-11

## 2024-06-11 PROBLEM — G89.29 CHRONIC LEFT HIP PAIN: Status: ACTIVE | Noted: 2024-06-11

## 2024-06-11 PROBLEM — M25.552 CHRONIC LEFT HIP PAIN: Status: ACTIVE | Noted: 2024-06-11

## 2024-06-11 PROBLEM — R22.41 MASS OF RIGHT THIGH: Status: RESOLVED | Noted: 2019-01-22 | Resolved: 2024-06-11

## 2024-07-20 ENCOUNTER — HOSPITAL ENCOUNTER (OUTPATIENT)
Age: 69
Discharge: HOME | End: 2024-07-20

## 2024-07-23 ENCOUNTER — HOSPITAL ENCOUNTER (OUTPATIENT)
Age: 69
Setting detail: SPECIMEN
Discharge: HOME | End: 2024-07-23

## 2024-07-23 DIAGNOSIS — Z12.5 SCREENING PSA (PROSTATE SPECIFIC ANTIGEN): ICD-10-CM

## 2024-07-24 LAB — PSA SERPL-MCNC: 1.8 NG/ML (ref 0–4)

## 2024-07-25 ENCOUNTER — PROCEDURE VISIT (OUTPATIENT)
Dept: UROLOGY | Age: 69
End: 2024-07-25

## 2024-07-25 VITALS — DIASTOLIC BLOOD PRESSURE: 75 MMHG | HEART RATE: 71 BPM | TEMPERATURE: 96.7 F | SYSTOLIC BLOOD PRESSURE: 146 MMHG

## 2024-07-25 DIAGNOSIS — C67.8 MALIGNANT NEOPLASM OF OVERLAPPING SITES OF BLADDER (HCC): Primary | ICD-10-CM

## 2024-07-25 NOTE — PROGRESS NOTES
Cystoscopy Operative Note (7/25/24)  Surgeon: Bro Easley MD  Anesthesia: Urethral 2% Xylocaine   Indications:   Malignant neoplasm of overlapping sites of bladder     Position: Dorsal Lithotomy    Findings:   Risks and Benefits discussed with patient prior to procedure.  The patient was prepped and draped in the usual sterile fashion.  The flexible cystoscope was advanced through the urethra and into the bladder.  The bladder was thoroughly inspected and the following was noted:    Residual Urine: mild  Urethra: normal appearing urethra with no masses, tenderness or lesions  Prostate: partially obstructing lateral lobes of prostate; median lobe present? no.   Bladder: No tumors or CIS noted.  No bladder diverticulum.  There was mild trabeculation noted.  Ureters: Clear efflux from both ureters.  Orifices with normal configuration and location.    The cystoscope was removed.  The patient tolerated the procedure well.     Agree with the ROS entered by the MA.

## 2024-10-01 RX ORDER — TIOTROPIUM BROMIDE INHALATION SPRAY 3.12 UG/1
SPRAY, METERED RESPIRATORY (INHALATION)
Qty: 12 G | Refills: 3 | Status: SHIPPED | OUTPATIENT
Start: 2024-10-01

## 2024-10-01 NOTE — TELEPHONE ENCOUNTER
Last visit: 6/4/24  Next visit: 12/17/24    Refill request for Spiriva. Per last dictation, patient advised to continue taking Spiriva 2 puffs once daily. Please see pended script and advise.

## 2024-12-17 ENCOUNTER — OFFICE VISIT (OUTPATIENT)
Dept: PULMONOLOGY | Age: 69
End: 2024-12-17
Payer: MEDICARE

## 2024-12-17 VITALS
WEIGHT: 287.6 LBS | BODY MASS INDEX: 40.26 KG/M2 | OXYGEN SATURATION: 97 % | DIASTOLIC BLOOD PRESSURE: 89 MMHG | SYSTOLIC BLOOD PRESSURE: 135 MMHG | RESPIRATION RATE: 18 BRPM | HEIGHT: 71 IN | HEART RATE: 84 BPM

## 2024-12-17 DIAGNOSIS — Z87.891 HISTORY OF SMOKING AT LEAST 1 PACK PER DAY FOR AT LEAST 30 YEARS: ICD-10-CM

## 2024-12-17 DIAGNOSIS — E66.01 SEVERE OBESITY (BMI 35.0-39.9) WITH COMORBIDITY: ICD-10-CM

## 2024-12-17 DIAGNOSIS — Z87.891 PERSONAL HISTORY OF TOBACCO USE: ICD-10-CM

## 2024-12-17 DIAGNOSIS — J44.9 CHRONIC OBSTRUCTIVE PULMONARY DISEASE, UNSPECIFIED COPD TYPE (HCC): Primary | ICD-10-CM

## 2024-12-17 DIAGNOSIS — R91.1 LUNG NODULE: ICD-10-CM

## 2024-12-17 DIAGNOSIS — G47.33 OSA (OBSTRUCTIVE SLEEP APNEA): ICD-10-CM

## 2024-12-17 PROBLEM — D31.32 NEVUS OF CHOROID OF LEFT EYE: Status: ACTIVE | Noted: 2024-07-02

## 2024-12-17 PROBLEM — H25.13 AGE-RELATED NUCLEAR CATARACT OF BOTH EYES: Status: ACTIVE | Noted: 2024-07-02

## 2024-12-17 PROBLEM — H35.3290 EXUDATIVE AGE-RELATED MACULAR DEGENERATION (HCC): Status: ACTIVE | Noted: 2024-07-02

## 2024-12-17 PROBLEM — H43.813 POSTERIOR VITREOUS DETACHMENT OF BOTH EYES: Status: ACTIVE | Noted: 2024-07-02

## 2024-12-17 PROCEDURE — 1159F MED LIST DOCD IN RCRD: CPT | Performed by: INTERNAL MEDICINE

## 2024-12-17 PROCEDURE — 1123F ACP DISCUSS/DSCN MKR DOCD: CPT | Performed by: INTERNAL MEDICINE

## 2024-12-17 PROCEDURE — 3075F SYST BP GE 130 - 139MM HG: CPT | Performed by: INTERNAL MEDICINE

## 2024-12-17 PROCEDURE — G0296 VISIT TO DETERM LDCT ELIG: HCPCS | Performed by: INTERNAL MEDICINE

## 2024-12-17 PROCEDURE — 3079F DIAST BP 80-89 MM HG: CPT | Performed by: INTERNAL MEDICINE

## 2024-12-17 PROCEDURE — 99214 OFFICE O/P EST MOD 30 MIN: CPT | Performed by: INTERNAL MEDICINE

## 2024-12-17 RX ORDER — ALPRAZOLAM 0.25 MG/1
0.25 TABLET ORAL
COMMUNITY

## 2024-12-17 ASSESSMENT — SLEEP AND FATIGUE QUESTIONNAIRES
HOW LIKELY ARE YOU TO NOD OFF OR FALL ASLEEP WHILE LYING DOWN TO REST IN THE AFTERNOON WHEN CIRCUMSTANCES PERMIT: WOULD NEVER DOZE
HOW LIKELY ARE YOU TO NOD OFF OR FALL ASLEEP WHILE SITTING AND READING: WOULD NEVER DOZE
ESS TOTAL SCORE: 0
HOW LIKELY ARE YOU TO NOD OFF OR FALL ASLEEP WHILE SITTING INACTIVE IN A PUBLIC PLACE: WOULD NEVER DOZE
HOW LIKELY ARE YOU TO NOD OFF OR FALL ASLEEP IN A CAR, WHILE STOPPED FOR A FEW MINUTES IN TRAFFIC: WOULD NEVER DOZE
HOW LIKELY ARE YOU TO NOD OFF OR FALL ASLEEP WHILE SITTING AND TALKING TO SOMEONE: WOULD NEVER DOZE
HOW LIKELY ARE YOU TO NOD OFF OR FALL ASLEEP WHILE WATCHING TV: WOULD NEVER DOZE
HOW LIKELY ARE YOU TO NOD OFF OR FALL ASLEEP WHEN YOU ARE A PASSENGER IN A CAR FOR AN HOUR WITHOUT A BREAK: WOULD NEVER DOZE
HOW LIKELY ARE YOU TO NOD OFF OR FALL ASLEEP WHILE SITTING QUIETLY AFTER LUNCH WITHOUT ALCOHOL: WOULD NEVER DOZE

## 2024-12-17 NOTE — PROGRESS NOTES
CYSTOSCOPY N/A 2023    CYSTOSCOPY BLADDER BIOPSY, FULGURATION performed by Bro Easley MD at [facility] OR    CYSTOSCOPY N/A 2023    CYSTOSCOPY BLADDER BIOPSY & FULGURATION performed by Bro Easley MD at [facility] OR    CYSTOSCOPY N/A 3/28/2024    CYSTOSCOPY BLADDER BIOPSY W/FULGURATION performed by Bro Easley MD at [facility] OR    EYE SURGERY Right     KNEE ARTHROSCOPY Right 2022    KNEE ARTHROSCOPIC PARTIAL LATERAL AND PARTIAL MEDIAL MENISCECTOMY performed by Kedar Rodriguez MD at [facility] OR    NASAL SINUS SURGERY  10/05/2018    SKIN CANCER EXCISION      TONSILLECTOMY      UMBILICAL HERNIA REPAIR  2011    UPPER GASTROINTESTINAL ENDOSCOPY N/A 2019    EGD BIOPSY performed by Garth Coe MD at [facility] OR    UPPER GASTROINTESTINAL ENDOSCOPY N/A 2023    EGD BIOPSY performed by Garth Coe MD at [facility] ENDO       Allergies:    Allergies   Allergen Reactions    Compazine Spansule [Prochlorperazine]      MUSCULAR DYSTONIA    Metoclopramide      MUSCULAR DYSTONIA    Seasonal Other (See Comments)     Allergic rhinitis         Home Meds:   Outpatient Encounter Medications as of 2024   Medication Sig Dispense Refill    ALPRAZolam (XANAX) 0.25 MG tablet Take 1 tablet by mouth.      SPIRIVA RESPIMAT 2.5 MCG/ACT AERS inhaler USE 2 INHALATIONS DAILY 12 g 3    lansoprazole (PREVACID) 30 MG delayed release capsule Take 1 capsule by mouth daily 90 capsule 3    fluticasone (FLONASE) 50 MCG/ACT nasal spray USE 2 SPRAYS NASALLY DAILY 48 g 3    simvastatin (ZOCOR) 20 MG tablet TAKE 1 TABLET NIGHTLY 90 tablet 3    amLODIPine (NORVASC) 10 MG tablet TAKE 1 TABLET DAILY 90 tablet 3    Hyoscyamine Sulfate SL (LEVSIN/SL) 0.125 MG SUBL Place 0.125 mg under the tongue every 4 hours as needed (bladder spasms) 30 each 0    acyclovir (ZOVIRAX) 800 MG tablet Si tablet in the am and 1 in the pm for each cold sore, may repeat as needed. (total of 2 tablets for each outbreak) 20 tablet 0    TART CHERRY PO

## 2025-01-15 ENCOUNTER — HOSPITAL ENCOUNTER (OUTPATIENT)
Age: 70
Setting detail: SPECIMEN
Discharge: HOME | End: 2025-01-15

## 2025-01-15 DIAGNOSIS — R73.9 HYPERGLYCEMIA: ICD-10-CM

## 2025-01-15 DIAGNOSIS — M1A.0410 IDIOPATHIC CHRONIC GOUT OF RIGHT HAND WITHOUT TOPHUS: ICD-10-CM

## 2025-01-15 DIAGNOSIS — E78.00 PURE HYPERCHOLESTEROLEMIA: ICD-10-CM

## 2025-01-15 DIAGNOSIS — I10 ESSENTIAL HYPERTENSION: ICD-10-CM

## 2025-01-15 LAB
ALBUMIN SERPL-MCNC: 4.4 G/DL (ref 3.5–5.2)
ALBUMIN/GLOB SERPL: 1.7 {RATIO} (ref 1–2.5)
ALP SERPL-CCNC: 73 U/L (ref 40–129)
ALT SERPL-CCNC: 15 U/L (ref 10–50)
ANION GAP SERPL CALCULATED.3IONS-SCNC: 10 MMOL/L (ref 9–16)
AST SERPL-CCNC: 18 U/L (ref 10–50)
BILIRUB SERPL-MCNC: 0.7 MG/DL (ref 0–1.2)
BUN SERPL-MCNC: 15 MG/DL (ref 8–23)
CALCIUM SERPL-MCNC: 9.8 MG/DL (ref 8.6–10.4)
CHLORIDE SERPL-SCNC: 101 MMOL/L (ref 98–107)
CHOLEST SERPL-MCNC: 164 MG/DL (ref 0–199)
CHOLESTEROL/HDL RATIO: 3.1
CO2 SERPL-SCNC: 27 MMOL/L (ref 20–31)
CREAT SERPL-MCNC: 1 MG/DL (ref 0.7–1.2)
ERYTHROCYTE [DISTWIDTH] IN BLOOD BY AUTOMATED COUNT: 12.7 % (ref 11.8–14.4)
EST. AVERAGE GLUCOSE BLD GHB EST-MCNC: 105 MG/DL
GFR, ESTIMATED: 80 ML/MIN/1.73M2
GLUCOSE SERPL-MCNC: 94 MG/DL (ref 74–99)
HBA1C MFR BLD: 5.3 % (ref 4–6)
HCT VFR BLD AUTO: 50 % (ref 40.7–50.3)
HDLC SERPL-MCNC: 53 MG/DL
HGB BLD-MCNC: 16 G/DL (ref 13–17)
LDLC SERPL CALC-MCNC: 88 MG/DL (ref 0–100)
MCH RBC QN AUTO: 30 PG (ref 25.2–33.5)
MCHC RBC AUTO-ENTMCNC: 32 G/DL (ref 28.4–34.8)
MCV RBC AUTO: 93.8 FL (ref 82.6–102.9)
NRBC BLD-RTO: 0 PER 100 WBC
PLATELET # BLD AUTO: 313 K/UL (ref 138–453)
PMV BLD AUTO: 9.2 FL (ref 8.1–13.5)
POTASSIUM SERPL-SCNC: 5.3 MMOL/L (ref 3.7–5.3)
PROT SERPL-MCNC: 7 G/DL (ref 6.6–8.7)
RBC # BLD AUTO: 5.33 M/UL (ref 4.21–5.77)
SODIUM SERPL-SCNC: 138 MMOL/L (ref 136–145)
TRIGL SERPL-MCNC: 117 MG/DL
URATE SERPL-MCNC: 7.3 MG/DL (ref 3.4–7)
VLDLC SERPL CALC-MCNC: 23 MG/DL (ref 1–30)
WBC OTHER # BLD: 9.6 K/UL (ref 3.5–11.3)

## 2025-01-30 ENCOUNTER — TELEPHONE (OUTPATIENT)
Dept: UROLOGY | Age: 70
End: 2025-01-30

## 2025-01-30 NOTE — TELEPHONE ENCOUNTER
Left message reminding patient of appointment on 2/6/25 at 10am, and to make sure he gets his PSA done before appointment.

## 2025-01-31 DIAGNOSIS — Z12.5 SCREENING PSA (PROSTATE SPECIFIC ANTIGEN): Primary | ICD-10-CM

## 2025-02-04 ENCOUNTER — HOSPITAL ENCOUNTER (OUTPATIENT)
Age: 70
Setting detail: SPECIMEN
Discharge: HOME | End: 2025-02-04

## 2025-02-04 DIAGNOSIS — Z12.5 SCREENING PSA (PROSTATE SPECIFIC ANTIGEN): ICD-10-CM

## 2025-02-04 LAB — PSA SERPL-MCNC: 2.39 NG/ML (ref 0–4)

## 2025-02-05 ENCOUNTER — TELEPHONE (OUTPATIENT)
Dept: UROLOGY | Age: 70
End: 2025-02-05

## 2025-02-05 NOTE — TELEPHONE ENCOUNTER
Patient notified of weather policy. If [facility] is under a level 3 snow emergency, the office will be closed. Once the office reopens, we will call to get rescheduled. Patient voiced understanding.

## 2025-02-06 ENCOUNTER — PROCEDURE VISIT (OUTPATIENT)
Dept: UROLOGY | Age: 70
End: 2025-02-06
Payer: MEDICARE

## 2025-02-06 ENCOUNTER — HOSPITAL ENCOUNTER (OUTPATIENT)
Age: 70
Setting detail: SPECIMEN
Discharge: HOME | End: 2025-02-06

## 2025-02-06 ENCOUNTER — PREP FOR PROCEDURE (OUTPATIENT)
Dept: UROLOGY | Age: 70
End: 2025-02-06

## 2025-02-06 DIAGNOSIS — R31.0 GROSS HEMATURIA: ICD-10-CM

## 2025-02-06 DIAGNOSIS — C67.8 MALIGNANT NEOPLASM OF OVERLAPPING SITES OF BLADDER (HCC): Primary | ICD-10-CM

## 2025-02-06 DIAGNOSIS — C67.8 MALIGNANT NEOPLASM OF OVERLAPPING SITES OF BLADDER (HCC): ICD-10-CM

## 2025-02-06 DIAGNOSIS — N39.0 URINARY TRACT INFECTION WITHOUT HEMATURIA, SITE UNSPECIFIED: Primary | ICD-10-CM

## 2025-02-06 DIAGNOSIS — N39.0 URINARY TRACT INFECTION WITHOUT HEMATURIA, SITE UNSPECIFIED: ICD-10-CM

## 2025-02-06 PROCEDURE — 52000 CYSTOURETHROSCOPY: CPT | Performed by: UROLOGY

## 2025-02-06 NOTE — PROGRESS NOTES
Cystoscopy Operative Note (2/6/25)  Surgeon: Bro Easley MD  Anesthesia: Urethral 2% Xylocaine   Indications: Bladder cancer  Position: Dorsal Lithotomy    Findings:   Risks and Benefits discussed with patient prior to procedure.  The patient was prepped and draped in the usual sterile fashion.  The flexible cystoscope was advanced through the urethra and into the bladder.  The bladder was thoroughly inspected and the following was noted:    Residual Urine: mild  Urethra: normal appearing urethra with no masses, tenderness or lesions  Prostate: partially obstructing lateral lobes of prostate; median lobe present? no.   Bladder: bladder tumor left lateral wall.   Ureters: Clear efflux from both ureters.  Orifices with normal configuration and location.    The cystoscope was removed.  The patient tolerated the procedure well.     Cysto turbt stc    Agree with the ROS entered by the MA.

## 2025-02-07 LAB
MICROORGANISM SPEC CULT: NO GROWTH
SERVICE CMNT-IMP: NORMAL
SPECIMEN DESCRIPTION: NORMAL

## 2025-02-10 ENCOUNTER — TELEPHONE (OUTPATIENT)
Dept: PULMONOLOGY | Age: 70
End: 2025-02-10

## 2025-02-10 NOTE — TELEPHONE ENCOUNTER
Message    Letter in epic   ----- Message -----   From: Ese Meyer   Sent: 2/7/2025   1:18 PM EST   To: Frederic Plasencia MD   Subject: Import                                            Imported by Ese Meyer on 2/7/2025 at  1:18 PM to the following: Abstract on 2/7/2025 with Frederic Plasencia MD [[id number]]     Media Information                      Notice:       Document on 2/7/2025  1:18 PM by Ese Meyer: 2/7/25 Surgery Clearance Request                   Description       2/7/25 Surgery Clearance Request              Patient       George Melo              Document Type 1       Procedure/Surgery            *Surgery clearance letter faxed to Dr Easley at [phone].*

## 2025-02-20 NOTE — H&P
HISTORY and PHYSICAL  [facility]       NAME:  George Melo  MRN: [id number]   YOB: 1955   Date: 2/21/2025   Age: 69 y.o.  Gender: male     COMPLAINT AND PRESENT HISTORY:   George Melo is 69 y.o.,  male, presents for pre-anesthesia/admission testing for CYSTOSCOPY TRANSURETHRAL RESECTION BLADDER per Dr. Easley.  Primary dx: Malignant neoplasm of overlapping sites of bladder (HCC) [C67.8].      Procedure note per Dr Easley on 2/6/2025  Cystoscopy Operative Note (2/6/25)  Surgeon: Bro Easley MD  Anesthesia: Urethral 2% Xylocaine   Indications: Bladder cancer  Position: Dorsal Lithotomy     Findings:   Risks and Benefits discussed with patient prior to procedure.  The patient was prepped and draped in the usual sterile fashion.  The flexible cystoscope was advanced through the urethra and into the bladder.  The bladder was thoroughly inspected and the following was noted:     Residual Urine: mild  Urethra: normal appearing urethra with no masses, tenderness or lesions  Prostate: partially obstructing lateral lobes of prostate; median lobe present? no.   Bladder: bladder tumor left lateral wall.   Ureters: Clear efflux from both ureters.  Orifices with normal configuration and location.     The cystoscope was removed.  The patient tolerated the procedure well.      Cysto turbt [facility]    UPDATE 2/21/2025  George Melo is 69 y.o.,  male, presents for pre-anesthesia/admission testing for CYSTOSCOPY TRANSURETHRAL RESECTION BLADDER per Dr. Easley.  Primary dx: Malignant neoplasm of overlapping sites of bladder (HCC) [C67.8].    Pt denies urology symptoms including  dysuria. discoloration of the urine, poor flow of urine, urine decreased, dribbling, increased frequency, urgency, and a sense of incomplete evacuation of the bladder.  Pt has history of bladder cancer with transurethral resection 2022.  H/o chemo treatment 2023    No nausea,  vomiting or diaphoresis.  Denies recent fever or chills.

## 2025-02-21 ENCOUNTER — HOSPITAL ENCOUNTER (OUTPATIENT)
Dept: PREADMISSION TESTING | Age: 70
Discharge: HOME | End: 2025-02-25
Attending: UROLOGY | Admitting: UROLOGY
Payer: MEDICARE

## 2025-02-21 ENCOUNTER — HOSPITAL ENCOUNTER (OUTPATIENT)
Age: 70
Setting detail: SPECIMEN
Discharge: HOME | End: 2025-02-21

## 2025-02-21 VITALS
HEART RATE: 77 BPM | HEIGHT: 71 IN | DIASTOLIC BLOOD PRESSURE: 93 MMHG | SYSTOLIC BLOOD PRESSURE: 160 MMHG | OXYGEN SATURATION: 100 % | TEMPERATURE: 97.9 F | RESPIRATION RATE: 20 BRPM | WEIGHT: 288 LBS | BODY MASS INDEX: 40.32 KG/M2

## 2025-02-21 DIAGNOSIS — I48.91 NEW ONSET A-FIB (HCC): ICD-10-CM

## 2025-02-21 LAB
ANION GAP SERPL CALCULATED.3IONS-SCNC: 10 MMOL/L (ref 9–16)
BASOPHILS # BLD: 0 K/UL (ref 0–0.2)
BASOPHILS NFR BLD: 1 % (ref 0–2)
BUN SERPL-MCNC: 17 MG/DL (ref 8–23)
CALCIUM SERPL-MCNC: 9.7 MG/DL (ref 8.6–10.4)
CHLORIDE SERPL-SCNC: 101 MMOL/L (ref 98–107)
CO2 SERPL-SCNC: 29 MMOL/L (ref 20–31)
CREAT SERPL-MCNC: 1.2 MG/DL (ref 0.7–1.2)
EKG Q-T INTERVAL: 376 MS
EKG QRS DURATION: 90 MS
EKG QTC CALCULATION (BAZETT): 431 MS
EKG R AXIS: 82 DEGREES
EKG T AXIS: 14 DEGREES
EKG VENTRICULAR RATE: 79 BPM
EOSINOPHIL # BLD: 0.2 K/UL (ref 0–0.4)
EOSINOPHILS RELATIVE PERCENT: 3 % (ref 0–4)
ERYTHROCYTE [DISTWIDTH] IN BLOOD BY AUTOMATED COUNT: 13.7 % (ref 11.5–14.9)
GFR, ESTIMATED: 65 ML/MIN/1.73M2
GLUCOSE SERPL-MCNC: 91 MG/DL (ref 74–99)
HCT VFR BLD AUTO: 49.3 % (ref 41–53)
HGB BLD-MCNC: 16.5 G/DL (ref 13.5–17.5)
LYMPHOCYTES NFR BLD: 1.6 K/UL (ref 1–4.8)
LYMPHOCYTES RELATIVE PERCENT: 19 % (ref 24–44)
MCH RBC QN AUTO: 31.2 PG (ref 26–34)
MCHC RBC AUTO-ENTMCNC: 33.5 G/DL (ref 31–37)
MCV RBC AUTO: 93 FL (ref 80–100)
MONOCYTES NFR BLD: 0.9 K/UL (ref 0.1–1.3)
MONOCYTES NFR BLD: 12 % (ref 1–7)
NEUTROPHILS NFR BLD: 65 % (ref 36–66)
NEUTS SEG NFR BLD: 5.3 K/UL (ref 1.3–9.1)
PLATELET # BLD AUTO: 272 K/UL (ref 150–450)
PMV BLD AUTO: 7.2 FL (ref 6–12)
POTASSIUM SERPL-SCNC: 4.9 MMOL/L (ref 3.7–5.3)
RBC # BLD AUTO: 5.3 M/UL (ref 4.5–5.9)
SODIUM SERPL-SCNC: 140 MMOL/L (ref 136–145)
T4 FREE SERPL-MCNC: 1.2 NG/DL (ref 0.92–1.68)
TSH SERPL DL<=0.05 MIU/L-ACNC: 0.96 UIU/ML (ref 0.27–4.2)
WBC OTHER # BLD: 8.1 K/UL (ref 3.5–11)

## 2025-02-21 PROCEDURE — 80048 BASIC METABOLIC PNL TOTAL CA: CPT

## 2025-02-21 PROCEDURE — 93005 ELECTROCARDIOGRAM TRACING: CPT | Performed by: ANESTHESIOLOGY

## 2025-02-21 PROCEDURE — APPSS45 APP SPLIT SHARED TIME 31-45 MINUTES: Performed by: NURSE PRACTITIONER

## 2025-02-21 PROCEDURE — 85025 COMPLETE CBC W/AUTO DIFF WBC: CPT

## 2025-02-21 PROCEDURE — 36415 COLL VENOUS BLD VENIPUNCTURE: CPT

## 2025-02-21 RX ORDER — ASPIRIN 81 MG/1
81 TABLET ORAL DAILY
COMMUNITY
End: 2025-02-28

## 2025-02-21 ASSESSMENT — ENCOUNTER SYMPTOMS
COUGH: 1
DIARRHEA: 0
SHORTNESS OF BREATH: 1
TROUBLE SWALLOWING: 0
SORE THROAT: 0
BACK PAIN: 0
NAUSEA: 0
ABDOMINAL PAIN: 0
APNEA: 1
VOMITING: 0
CONSTIPATION: 1

## 2025-02-21 NOTE — DISCHARGE INSTRUCTIONS
Pre-op Instructions For Out-Patient Surgery    Medication Instructions:  Please stop herbs and any supplements now (includes vitamins and minerals).    For these medications:  Dulaglutide (Trulicity), Exenatide (Byetta and Bydureon, Liraglutide (Victoza), Lixisenatide (Adlyxin), Semaglutide (Ozempic and Rybelsus), Tirzepatide (Mounjaro, Zepbound)- Stop 1 week prior if taking weekly or 1 day prior if taking every 12 hours or daily.     Please contact your surgeon and prescribing physician for pre-op instructions for any blood thinners. Stop Aspirin as dircted    If you have inhalers/aerosol treatments at home, please use them the morning of your surgery and bring the inhalers with you to the hospital.    Please take the following medications the morning of your surgery with a sip of water:    Amlodipine, Lansoprazole, Inhaler    Surgery Instructions:  After midnight before surgery:  Do not eat or drink anything, including water, mints, gum, and hard candy.  You may brush your teeth without swallowing.  No smoking, chewing tobacco, or street drugs.    Please shower or bathe before surgery.  If you were given Surgical Scrub Chlorhexidine Gluconate Liquid (CHG), please shower the night before and the morning of your surgery following the detailed instructions you received during your pre-admission visit.     Please do not wear any cologne, lotion, powder, deodorant, jewelry, piercings, perfume, makeup, nail polish, hair accessories, or hair spray on the day of surgery.  Wear loose comfortable clothing.    Leave your valuables at home but bring a payment source for any after-surgery prescriptions you plan to fill at [facility].  Bring a storage case for any glasses/contacts.    An adult who is responsible for you MUST drive you home and should be with you for the first 24 hours after surgery.     If having out-patient knee and foot surgeries, please arrange for planned crutches,

## 2025-02-24 LAB
EKG Q-T INTERVAL: 376 MS
EKG QRS DURATION: 90 MS
EKG QTC CALCULATION (BAZETT): 431 MS
EKG R AXIS: 82 DEGREES
EKG T AXIS: 14 DEGREES
EKG VENTRICULAR RATE: 79 BPM

## 2025-02-24 PROCEDURE — 93010 ELECTROCARDIOGRAM REPORT: CPT | Performed by: INTERNAL MEDICINE

## 2025-02-28 ENCOUNTER — TELEPHONE (OUTPATIENT)
Dept: UROLOGY | Age: 70
End: 2025-02-28

## 2025-02-28 NOTE — TELEPHONE ENCOUNTER
Returned call to patient from  that was left on surgery line.     Spoke with patient, patient states that he will need to reschedule procedure due to recent results during PAT appointment. Patient states that he is scheduled to see cardiology in a few weeks. Patient informed procedure will be rescheduled for April to give patient time to see cardiologist. Patient agrees with plan and if changes are needed he will contact the office. Patient verbalizes understanding and call was ended.       New procedure:     Cysto, TURBT w/Gyrus @ [facility] 04/18/25 8:30am  **STOP BLOOD THINNERS ON 04/11/25**    PAT: 04/04/25 @ 12:30pm

## 2025-03-19 ENCOUNTER — TELEPHONE (OUTPATIENT)
Dept: UROLOGY | Age: 70
End: 2025-03-19

## 2025-03-19 NOTE — TELEPHONE ENCOUNTER
Patient returned call and LM on surgery line. Writer returned call to patient to follow up. Patient states that 04/22/25 will work fine for him for procedure date. Patient informed everything has been switched over. Patient verbalizes understanding and call was ended.

## 2025-03-19 NOTE — TELEPHONE ENCOUNTER
LM for patient to contact office to follow up in regards to rescheduling procedure as Dr Easley will not be available that day.       New procedure Information:   MARCELA Spangler w/Silvina @ [facility] 04/22/25 12:30pm

## 2025-04-03 NOTE — H&P (VIEW-ONLY)
HISTORY and PHYSICAL  [facility]       NAME:  George Melo  MRN: [id number]   YOB: 1955   Date: 4/4/2025   Age: 69 y.o.  Gender: male     COMPLAINT AND PRESENT HISTORY:   George Melo is 69 y.o.,  male, presents for pre-anesthesia/admission testing for CYSTOSCOPY TRANSURETHRAL RESECTION BLADDER per Dr. Easley    Primary dx: Malignant neoplasm of overlapping sites of bladder (HCC) [C67.8].    HPI:  George Melo is 69 y.o.,   male, has had cystoscopy transurethral resection bladder tumor  on 7/5/2022 followed  with chemo treatment 2023 due to bladder cancer.   He has had multiple Cystoscopy done after,  the last cystoscopy done on 2/6/2025 per Dr Easley  showed bladder tumor left lateral wall.    Pt denies poor flow of urine, dribbling, frequency and a sense of incomplete evacuation of the bladder. No nausea,  vomiting or diaphoresis.  No dysuria. No discoloration of the urine, No fever or chills.    RECENT IMAGING R/T HPI     Cystoscopy Operative Note (2/6/25)   Findings:   Risks and Benefits discussed with patient prior to procedure.  The patient was prepped and draped in the usual sterile fashion.  The flexible cystoscope was advanced through the urethra and into the bladder.  The bladder was thoroughly inspected and the following was noted:   Residual Urine: mild  Urethra: normal appearing urethra with no masses, tenderness or lesions  Prostate: partially obstructing lateral lobes of prostate; median lobe present? no.   Bladder: bladder tumor left lateral wall.   Ureters: Clear efflux from both ureters.  Orifices with normal configuration and location.  The cystoscope was removed.  The patient tolerated the procedure well.   Cysto turbt [facility]    Review of additional significant medical hx:    COPD, allergic rhinitis, mild sleep apnea CPAP, Lung nodule   Managed with taking :Spiriva inh, Flonase   Pt denies SOB, wheezing,  or coughing  Pt follow up with his pulmonologist last office

## 2025-04-04 ENCOUNTER — HOSPITAL ENCOUNTER (OUTPATIENT)
Dept: PREADMISSION TESTING | Age: 70
Discharge: HOME | End: 2025-04-04
Payer: MEDICARE

## 2025-04-04 VITALS
BODY MASS INDEX: 40.6 KG/M2 | OXYGEN SATURATION: 97 % | RESPIRATION RATE: 20 BRPM | DIASTOLIC BLOOD PRESSURE: 71 MMHG | WEIGHT: 290 LBS | HEART RATE: 57 BPM | TEMPERATURE: 97.9 F | SYSTOLIC BLOOD PRESSURE: 139 MMHG | HEIGHT: 71 IN

## 2025-04-04 LAB
ANION GAP SERPL CALCULATED.3IONS-SCNC: 11 MMOL/L (ref 9–16)
BASOPHILS # BLD: 0.1 K/UL (ref 0–0.2)
BASOPHILS NFR BLD: 1 % (ref 0–2)
BUN SERPL-MCNC: 14 MG/DL (ref 8–23)
CALCIUM SERPL-MCNC: 9.5 MG/DL (ref 8.6–10.4)
CHLORIDE SERPL-SCNC: 100 MMOL/L (ref 98–107)
CO2 SERPL-SCNC: 26 MMOL/L (ref 20–31)
CREAT SERPL-MCNC: 1.1 MG/DL (ref 0.7–1.2)
EOSINOPHIL # BLD: 0.3 K/UL (ref 0–0.4)
EOSINOPHILS RELATIVE PERCENT: 4 % (ref 0–4)
ERYTHROCYTE [DISTWIDTH] IN BLOOD BY AUTOMATED COUNT: 12.9 % (ref 11.5–14.9)
GFR, ESTIMATED: 72 ML/MIN/1.73M2
GLUCOSE SERPL-MCNC: 84 MG/DL (ref 74–99)
HCT VFR BLD AUTO: 48.6 % (ref 41–53)
HGB BLD-MCNC: 16.5 G/DL (ref 13.5–17.5)
LYMPHOCYTES NFR BLD: 1.5 K/UL (ref 1–4.8)
LYMPHOCYTES RELATIVE PERCENT: 17 % (ref 24–44)
MCH RBC QN AUTO: 30.8 PG (ref 26–34)
MCHC RBC AUTO-ENTMCNC: 33.9 G/DL (ref 31–37)
MCV RBC AUTO: 90.8 FL (ref 80–100)
MONOCYTES NFR BLD: 1.1 K/UL (ref 0.1–1.3)
MONOCYTES NFR BLD: 12 % (ref 1–7)
NEUTROPHILS NFR BLD: 66 % (ref 36–66)
NEUTS SEG NFR BLD: 6 K/UL (ref 1.3–9.1)
PLATELET # BLD AUTO: 297 K/UL (ref 150–450)
PMV BLD AUTO: 7.4 FL (ref 6–12)
POTASSIUM SERPL-SCNC: 4.5 MMOL/L (ref 3.7–5.3)
RBC # BLD AUTO: 5.35 M/UL (ref 4.5–5.9)
SODIUM SERPL-SCNC: 137 MMOL/L (ref 136–145)
WBC OTHER # BLD: 8.8 K/UL (ref 3.5–11)

## 2025-04-04 PROCEDURE — 85025 COMPLETE CBC W/AUTO DIFF WBC: CPT

## 2025-04-04 PROCEDURE — 36415 COLL VENOUS BLD VENIPUNCTURE: CPT

## 2025-04-04 PROCEDURE — 80048 BASIC METABOLIC PNL TOTAL CA: CPT

## 2025-04-04 ASSESSMENT — ENCOUNTER SYMPTOMS
SHORTNESS OF BREATH: 1
APNEA: 1
GASTROINTESTINAL NEGATIVE: 1
COUGH: 0
WHEEZING: 0

## 2025-04-04 NOTE — DISCHARGE INSTRUCTIONS
Pre-op Instructions For Out-Patient Surgery    Medication Instructions:  Please stop herbs and any supplements now (includes vitamins and minerals).    For these medications:  Dulaglutide (Trulicity), Exenatide (Byetta and Bydureon, Liraglutide (Victoza), Lixisenatide (Adlyxin), Semaglutide (Ozempic and Rybelsus), Tirzepatide (Mounjaro, Zepbound)- Stop 1 week prior if taking weekly or 1 day prior if taking every 12 hours or daily. N/A    Please contact your surgeon and prescribing physician for pre-op instructions for any blood thinners. Eliquis     If you have inhalers/aerosol treatments at home, please use them the morning of your surgery and bring the inhalers with you to the hospital.    Please take the following medications the morning of your surgery with a sip of water:    Sotalol and Prevacid     Surgery Instructions:  After midnight before surgery:  Do not eat or drink anything, including water, mints, gum, and hard candy.  You may brush your teeth without swallowing.  No smoking, chewing tobacco, or street drugs.    Please shower or bathe before surgery.  If you were given Surgical Scrub Chlorhexidine Gluconate Liquid (CHG), please shower the night before and the morning of your surgery following the detailed instructions you received during your pre-admission visit.     Please do not wear any cologne, lotion, powder, deodorant, jewelry, piercings, perfume, makeup, nail polish, hair accessories, or hair spray on the day of surgery.  Wear loose comfortable clothing.    Leave your valuables at home but bring a payment source for any after-surgery prescriptions you plan to fill at [facility].  Bring a storage case for any glasses/contacts.    An adult who is responsible for you MUST drive you home and should be with you for the first 24 hours after surgery.     If having out-patient knee and foot surgeries, please arrange for planned crutches, walker, or wheelchair

## 2025-04-21 ENCOUNTER — ANESTHESIA EVENT (OUTPATIENT)
Dept: OPERATING ROOM | Age: 70
End: 2025-04-21
Payer: MEDICARE

## 2025-04-21 ENCOUNTER — RESULTS FOLLOW-UP (OUTPATIENT)
Dept: CRITICAL CARE MEDICINE | Age: 70
End: 2025-04-21

## 2025-04-21 ENCOUNTER — TELEPHONE (OUTPATIENT)
Dept: UROLOGY | Age: 70
End: 2025-04-21

## 2025-04-21 ENCOUNTER — HOSPITAL ENCOUNTER (OUTPATIENT)
Dept: CT IMAGING | Age: 70
Discharge: HOME | End: 2025-04-23
Attending: INTERNAL MEDICINE
Payer: MEDICARE

## 2025-04-21 VITALS — WEIGHT: 290 LBS | BODY MASS INDEX: 40.6 KG/M2 | HEIGHT: 71 IN

## 2025-04-21 DIAGNOSIS — Z87.891 PERSONAL HISTORY OF TOBACCO USE: ICD-10-CM

## 2025-04-21 PROCEDURE — 71271 CT THORAX LUNG CANCER SCR C-: CPT

## 2025-04-21 NOTE — TELEPHONE ENCOUNTER
LM for patient to inform of time change for procedure on 04/22/25.     Arrival: 8:30am  Procedure Time: 10:30am

## 2025-04-21 NOTE — PRE-PROCEDURE INSTRUCTIONS
Nothing to eat after midnight. Y  Are you taking any blood thinners? When was the last day?STOPPED  Make sure to use Hibiclens prior to surgery.  Remove any jewelry and body piercings.Y  Do you wear glasses? If so, please bring a case to store them in.  Are you having any Covid symptoms?N  Do you have any new rashes, infections, etc. that we should be aware of?N  Do you have a ride home the day of surgery? It cannot be a cab or medical transportation.Y  Verify surgery time and what time to arrive at hospital.[id number]

## 2025-04-22 ENCOUNTER — ANESTHESIA (OUTPATIENT)
Dept: OPERATING ROOM | Age: 70
End: 2025-04-22
Payer: MEDICARE

## 2025-04-22 ENCOUNTER — HOSPITAL ENCOUNTER (OUTPATIENT)
Age: 70
Setting detail: OUTPATIENT SURGERY
Discharge: HOME | End: 2025-04-22
Attending: UROLOGY | Admitting: UROLOGY
Payer: MEDICARE

## 2025-04-22 VITALS
TEMPERATURE: 97.3 F | SYSTOLIC BLOOD PRESSURE: 163 MMHG | RESPIRATION RATE: 16 BRPM | DIASTOLIC BLOOD PRESSURE: 93 MMHG | HEART RATE: 59 BPM | WEIGHT: 290 LBS | OXYGEN SATURATION: 95 % | BODY MASS INDEX: 40.6 KG/M2 | HEIGHT: 71 IN

## 2025-04-22 DIAGNOSIS — C67.8 MALIGNANT NEOPLASM OF OVERLAPPING SITES OF BLADDER (HCC): ICD-10-CM

## 2025-04-22 PROCEDURE — 88307 TISSUE EXAM BY PATHOLOGIST: CPT

## 2025-04-22 PROCEDURE — 2500000003 HC RX 250 WO HCPCS: Performed by: NURSE ANESTHETIST, CERTIFIED REGISTERED

## 2025-04-22 PROCEDURE — 7100000031 HC ASPR PHASE II RECOVERY - ADDTL 15 MIN: Performed by: UROLOGY

## 2025-04-22 PROCEDURE — 7100000030 HC ASPR PHASE II RECOVERY - FIRST 15 MIN: Performed by: UROLOGY

## 2025-04-22 PROCEDURE — 6360000002 HC RX W HCPCS: Performed by: UROLOGY

## 2025-04-22 PROCEDURE — 3700000000 HC ANESTHESIA ATTENDED CARE: Performed by: UROLOGY

## 2025-04-22 PROCEDURE — 3600000002 HC SURGERY LEVEL 2 BASE: Performed by: UROLOGY

## 2025-04-22 PROCEDURE — 3600000012 HC SURGERY LEVEL 2 ADDTL 15MIN: Performed by: UROLOGY

## 2025-04-22 PROCEDURE — 3700000001 HC ADD 15 MINUTES (ANESTHESIA): Performed by: UROLOGY

## 2025-04-22 PROCEDURE — 7100000001 HC PACU RECOVERY - ADDTL 15 MIN: Performed by: UROLOGY

## 2025-04-22 PROCEDURE — 7100000010 HC PHASE II RECOVERY - FIRST 15 MIN: Performed by: UROLOGY

## 2025-04-22 PROCEDURE — 7100000011 HC PHASE II RECOVERY - ADDTL 15 MIN: Performed by: UROLOGY

## 2025-04-22 PROCEDURE — 2580000003 HC RX 258: Performed by: ANESTHESIOLOGY

## 2025-04-22 PROCEDURE — 6360000002 HC RX W HCPCS: Performed by: NURSE ANESTHETIST, CERTIFIED REGISTERED

## 2025-04-22 PROCEDURE — 7100000000 HC PACU RECOVERY - FIRST 15 MIN: Performed by: UROLOGY

## 2025-04-22 PROCEDURE — 2709999900 HC NON-CHARGEABLE SUPPLY: Performed by: UROLOGY

## 2025-04-22 RX ORDER — ROCURONIUM BROMIDE 10 MG/ML
INJECTION, SOLUTION INTRAVENOUS
Status: DISCONTINUED | OUTPATIENT
Start: 2025-04-22 | End: 2025-04-22 | Stop reason: SDUPTHER

## 2025-04-22 RX ORDER — SODIUM CHLORIDE 0.9 % (FLUSH) 0.9 %
5-40 SYRINGE (ML) INJECTION PRN
Status: DISCONTINUED | OUTPATIENT
Start: 2025-04-22 | End: 2025-04-22 | Stop reason: HOSPADM

## 2025-04-22 RX ORDER — HYDRALAZINE HYDROCHLORIDE 20 MG/ML
10 INJECTION INTRAMUSCULAR; INTRAVENOUS
Status: DISCONTINUED | OUTPATIENT
Start: 2025-04-22 | End: 2025-04-22 | Stop reason: HOSPADM

## 2025-04-22 RX ORDER — LIDOCAINE HYDROCHLORIDE 10 MG/ML
1 INJECTION, SOLUTION EPIDURAL; INFILTRATION; INTRACAUDAL; PERINEURAL
Status: DISCONTINUED | OUTPATIENT
Start: 2025-04-22 | End: 2025-04-22 | Stop reason: HOSPADM

## 2025-04-22 RX ORDER — ACETAMINOPHEN 325 MG/1
650 TABLET ORAL
Status: DISCONTINUED | OUTPATIENT
Start: 2025-04-22 | End: 2025-04-22 | Stop reason: HOSPADM

## 2025-04-22 RX ORDER — GABAPENTIN 100 MG/1
100 CAPSULE ORAL ONCE
Status: DISCONTINUED | OUTPATIENT
Start: 2025-04-22 | End: 2025-04-22

## 2025-04-22 RX ORDER — ONDANSETRON 2 MG/ML
4 INJECTION INTRAMUSCULAR; INTRAVENOUS
Status: DISCONTINUED | OUTPATIENT
Start: 2025-04-22 | End: 2025-04-22 | Stop reason: HOSPADM

## 2025-04-22 RX ORDER — DIPHENHYDRAMINE HYDROCHLORIDE 50 MG/ML
12.5 INJECTION, SOLUTION INTRAMUSCULAR; INTRAVENOUS
Status: DISCONTINUED | OUTPATIENT
Start: 2025-04-22 | End: 2025-04-22 | Stop reason: HOSPADM

## 2025-04-22 RX ORDER — PROPOFOL 10 MG/ML
INJECTION, EMULSION INTRAVENOUS
Status: DISCONTINUED | OUTPATIENT
Start: 2025-04-22 | End: 2025-04-22 | Stop reason: SDUPTHER

## 2025-04-22 RX ORDER — ONDANSETRON 2 MG/ML
INJECTION INTRAMUSCULAR; INTRAVENOUS
Status: DISCONTINUED | OUTPATIENT
Start: 2025-04-22 | End: 2025-04-22 | Stop reason: SDUPTHER

## 2025-04-22 RX ORDER — ACETAMINOPHEN 500 MG
1000 TABLET ORAL ONCE
Status: DISCONTINUED | OUTPATIENT
Start: 2025-04-22 | End: 2025-04-22

## 2025-04-22 RX ORDER — SODIUM CHLORIDE 9 MG/ML
INJECTION, SOLUTION INTRAVENOUS PRN
Status: DISCONTINUED | OUTPATIENT
Start: 2025-04-22 | End: 2025-04-22 | Stop reason: HOSPADM

## 2025-04-22 RX ORDER — SODIUM CHLORIDE, SODIUM LACTATE, POTASSIUM CHLORIDE, CALCIUM CHLORIDE 600; 310; 30; 20 MG/100ML; MG/100ML; MG/100ML; MG/100ML
INJECTION, SOLUTION INTRAVENOUS CONTINUOUS
Status: DISCONTINUED | OUTPATIENT
Start: 2025-04-22 | End: 2025-04-22 | Stop reason: HOSPADM

## 2025-04-22 RX ORDER — LABETALOL HYDROCHLORIDE 5 MG/ML
10 INJECTION, SOLUTION INTRAVENOUS
Status: DISCONTINUED | OUTPATIENT
Start: 2025-04-22 | End: 2025-04-22 | Stop reason: HOSPADM

## 2025-04-22 RX ORDER — SODIUM CHLORIDE 0.9 % (FLUSH) 0.9 %
5-40 SYRINGE (ML) INJECTION EVERY 12 HOURS SCHEDULED
Status: DISCONTINUED | OUTPATIENT
Start: 2025-04-22 | End: 2025-04-22 | Stop reason: HOSPADM

## 2025-04-22 RX ORDER — METOCLOPRAMIDE HYDROCHLORIDE 5 MG/ML
10 INJECTION INTRAMUSCULAR; INTRAVENOUS
Status: DISCONTINUED | OUTPATIENT
Start: 2025-04-22 | End: 2025-04-22 | Stop reason: HOSPADM

## 2025-04-22 RX ORDER — LIDOCAINE HYDROCHLORIDE 10 MG/ML
INJECTION, SOLUTION EPIDURAL; INFILTRATION; INTRACAUDAL; PERINEURAL
Status: DISCONTINUED | OUTPATIENT
Start: 2025-04-22 | End: 2025-04-22 | Stop reason: SDUPTHER

## 2025-04-22 RX ORDER — DEXAMETHASONE SODIUM PHOSPHATE 4 MG/ML
INJECTION, SOLUTION INTRA-ARTICULAR; INTRALESIONAL; INTRAMUSCULAR; INTRAVENOUS; SOFT TISSUE
Status: DISCONTINUED | OUTPATIENT
Start: 2025-04-22 | End: 2025-04-22 | Stop reason: SDUPTHER

## 2025-04-22 RX ORDER — NALOXONE HYDROCHLORIDE 0.4 MG/ML
INJECTION, SOLUTION INTRAMUSCULAR; INTRAVENOUS; SUBCUTANEOUS PRN
Status: DISCONTINUED | OUTPATIENT
Start: 2025-04-22 | End: 2025-04-22 | Stop reason: HOSPADM

## 2025-04-22 RX ORDER — CEPHALEXIN 500 MG/1
500 CAPSULE ORAL 3 TIMES DAILY
Qty: 15 CAPSULE | Refills: 0 | Status: SHIPPED | OUTPATIENT
Start: 2025-04-22

## 2025-04-22 RX ORDER — MEPERIDINE HYDROCHLORIDE 25 MG/ML
12.5 INJECTION INTRAMUSCULAR; INTRAVENOUS; SUBCUTANEOUS EVERY 5 MIN PRN
Status: DISCONTINUED | OUTPATIENT
Start: 2025-04-22 | End: 2025-04-22 | Stop reason: HOSPADM

## 2025-04-22 RX ORDER — FENTANYL CITRATE 0.05 MG/ML
25 INJECTION, SOLUTION INTRAMUSCULAR; INTRAVENOUS EVERY 5 MIN PRN
Status: DISCONTINUED | OUTPATIENT
Start: 2025-04-22 | End: 2025-04-22 | Stop reason: HOSPADM

## 2025-04-22 RX ADMIN — Medication 3000 MG: at 10:19

## 2025-04-22 RX ADMIN — PROPOFOL 200 MG: 10 INJECTION, EMULSION INTRAVENOUS at 10:32

## 2025-04-22 RX ADMIN — LIDOCAINE HYDROCHLORIDE 50 MG: 10 INJECTION, SOLUTION EPIDURAL; INFILTRATION; INTRACAUDAL; PERINEURAL at 10:32

## 2025-04-22 RX ADMIN — ROCURONIUM BROMIDE 50 MG: 10 INJECTION, SOLUTION INTRAVENOUS at 10:24

## 2025-04-22 RX ADMIN — ROCURONIUM BROMIDE 50 MG: 10 INJECTION, SOLUTION INTRAVENOUS at 10:32

## 2025-04-22 RX ADMIN — SODIUM CHLORIDE, POTASSIUM CHLORIDE, SODIUM LACTATE AND CALCIUM CHLORIDE: 600; 310; 30; 20 INJECTION, SOLUTION INTRAVENOUS at 09:35

## 2025-04-22 RX ADMIN — DEXAMETHASONE SODIUM PHOSPHATE 4 MG: 4 INJECTION INTRA-ARTICULAR; INTRALESIONAL; INTRAMUSCULAR; INTRAVENOUS; SOFT TISSUE at 10:32

## 2025-04-22 RX ADMIN — LIDOCAINE HYDROCHLORIDE 50 MG: 10 INJECTION, SOLUTION EPIDURAL; INFILTRATION; INTRACAUDAL; PERINEURAL at 10:24

## 2025-04-22 RX ADMIN — SUGAMMADEX 200 MG: 100 INJECTION, SOLUTION INTRAVENOUS at 10:57

## 2025-04-22 RX ADMIN — ONDANSETRON 4 MG: 2 INJECTION INTRAMUSCULAR; INTRAVENOUS at 10:32

## 2025-04-22 ASSESSMENT — ENCOUNTER SYMPTOMS: SHORTNESS OF BREATH: 1

## 2025-04-22 ASSESSMENT — PAIN - FUNCTIONAL ASSESSMENT
PAIN_FUNCTIONAL_ASSESSMENT: NONE - DENIES PAIN

## 2025-04-22 NOTE — DISCHARGE INSTRUCTIONS
temperature (by mouth) is 101 degrees or higher, chills, or excessive sweating.  You have increasing and progressive bleeding or drainage from surgery site.  Signs of an infection:  increased swelling, redness, warmth, or hardness around surgery area or yellow or green drainage.  Persistent nausea or vomiting and can’t keep fluids down.  If you are unable to urinate within 8 hours of surgery.  Redness or swelling at IV site.  For any questions or concerns you may have.

## 2025-04-22 NOTE — BRIEF OP NOTE
Brief Postoperative Note      Patient: George Melo  YOB: 1955  MRN: [id number]    Date of Procedure: 4/22/2025    Pre-Op Diagnosis Codes:      * Malignant neoplasm of overlapping sites of bladder (HCC) [C67.8]    Post-Op Diagnosis: Same       Procedure(s):  CYSTOSCOPY TRANSURETHRAL RESECTION BLADDER    Surgeon(s):  Bro Easley MD    Assistant:  * No surgical staff found *    Anesthesia: General    Estimated Blood Loss (mL): Minimal    Complications: None    Specimens:   * No specimens in log *    Implants:  * No implants in log *      Drains: * No LDAs found *    Findings:  Infection Present At Time Of Surgery (PATOS) (choose all levels that have infection present):  No infection present  Other Findings: d    Electronically signed by Bro Easley MD on 4/22/2025 at 10:01 AM

## 2025-04-22 NOTE — INTERVAL H&P NOTE
Update History & Physical    The patient's History and Physical of   April 4, 2025    Patient will be having : Procedure(s):  CYSTOSCOPY TRANSURETHRAL RESECTION BLADDER      There was  no change. Or updates at this time.      Patient  did  obtain Medical, Cardiac & Pulmonary  clearance.     Blood thinners/ Anticoagulant:  Eliquis    Patient denies any personal or family problems with anesthesia.    Patient was physically assessed, including cardiovascular and respiratory. Denies any chest pain or SOB. Denies any recent URI, no fever or chills.     Patient understands and wants to proceed with the procedure.     Vitals:  /77   Pulse 50   Temp 97.7 °F (36.5 °C) (Infrared)   Resp 20   Ht 1.803 m (5' 11\")   Wt 131.5 kg (290 lb)   SpO2 97%   BMI 40.45 kg/m²  Body mass index is 40.45 kg/m².    Electronically signed by VIKY ROBLES CNP on 4/22/2025 at 9:21 AM      Note marked for cosign by provider

## 2025-04-22 NOTE — ANESTHESIA POSTPROCEDURE EVALUATION
Department of Anesthesiology  Postprocedure Note    Patient: George Melo  MRN: [id number]  YOB: 1955  Date of evaluation: 4/22/2025    Procedure Summary       Date: 04/22/25 Room / Location: [place] [address] / [facility]    Anesthesia Start: 1019 Anesthesia Stop: 1110    Procedure: CYSTOSCOPY TRANSURETHRAL RESECTION BLADDER (Bladder) Diagnosis:       Malignant neoplasm of overlapping sites of bladder (HCC)      (Malignant neoplasm of overlapping sites of bladder (HCC) [C67.8])    Surgeons: Bro Easley MD Responsible Provider: Luis Barnett MD    Anesthesia Type: General ASA Status: 3            Anesthesia Type: General    Ann Marie Phase I: Ann Marie Score: 10    Ann Marie Phase II: Ann Marie Score: 10    Anesthesia Post Evaluation    Comments: POST- ANESTHESIA EVALUATION       Pt Name: George Melo  MRN: [id number]  YOB: 1955  Date of evaluation: 4/22/2025  Time:  11:55 AM      BP (!) 163/93   Pulse 59   Temp 97.3 °F (36.3 °C) (Infrared)   Resp 16   Ht 1.803 m (5' 11\")   Wt 131.5 kg (290 lb)   SpO2 95%   BMI 40.45 kg/m²      Consciousness Level  Awake  Cardiopulmonary Status  Stable  Pain Adequately Treated YES  Nausea / Vomiting  NO  Adequate Hydration  YES  Anesthesia Related Complications NONE      Electronically signed by Luis Barnett MD on 4/22/2025 at 11:55 AM           No notable events documented.

## 2025-04-22 NOTE — ANESTHESIA PRE PROCEDURE
Department of Anesthesiology  Preprocedure Note       Name:  George Melo   Age:  69 y.o.  :  1955                                          MRN:  [id number]         Date:  2025      Surgeon: Surgeon(s):  Bro Easley MD    Procedure: Procedure(s):  CYSTOSCOPY TRANSURETHRAL RESECTION BLADDER    Medications prior to admission:   Prior to Admission medications    Medication Sig Start Date End Date Taking? Authorizing Provider   simvastatin (ZOCOR) 20 MG tablet TAKE 1 TABLET NIGHTLY 3/28/25   Maddie Moreira APRN - CNP   apixaban (ELIQUIS) 5 MG TABS tablet Take 1 tablet by mouth 2 times daily for 28 days 3/21/25 4/18/25  Maddie Moreira APRN - CNP   sotalol (BETAPACE) 80 MG tablet Take 0.5 tablets by mouth 2 times daily 25  Maddie Moreira APRN - CNP   amLODIPine (NORVASC) 10 MG tablet TAKE 1 TABLET DAILY 24   Maddie Moreira APRN - CNP   SPIRIVA RESPIMAT 2.5 MCG/ACT AERS inhaler USE 2 INHALATIONS DAILY 10/1/24   Frederic Plasencia MD   lansoprazole (PREVACID) 30 MG delayed release capsule Take 1 capsule by mouth daily 24   Maddie Moreira APRN - CNP   fluticasone (FLONASE) 50 MCG/ACT nasal spray USE 2 SPRAYS NASALLY DAILY 24   Maddie Moreira APRN - CNP   albuterol sulfate HFA (PROVENTIL;VENTOLIN;PROAIR) 108 (90 Base) MCG/ACT inhaler Inhale 2 puffs into the lungs every 6 hours as needed for Wheezing or Shortness of Breath 24  Frederic Plasencia MD   acyclovir (ZOVIRAX) 800 MG tablet Si tablet in the am and 1 in the pm for each cold sore, may repeat as needed. (total of 2 tablets for each outbreak) 1/3/23   Maddie Moreira APRN - CNP   TART CHERRY PO Take 1 capsule by mouth daily    Provider, MD Michaelle   vitamin D3 (CHOLECALCIFEROL) 25 MCG (1000 UT) TABS tablet Take 1 tablet by mouth daily 3/19/21   Maddie Moreira, APRN - CNP       Current medications:    Current Facility-Administered Medications   Medication Dose Route Frequency Provider Last Rate Last Admin   • sodium

## 2025-04-22 NOTE — PROGRESS NOTES
No [employer] prescriber sent post-op Rx to [id number] - [place], [place] - [address] [phone] [facility] let family Drea know

## 2025-04-23 ENCOUNTER — PROCEDURE VISIT (OUTPATIENT)
Dept: UROLOGY | Age: 70
End: 2025-04-23

## 2025-04-23 VITALS — DIASTOLIC BLOOD PRESSURE: 81 MMHG | TEMPERATURE: 97.8 F | SYSTOLIC BLOOD PRESSURE: 142 MMHG | HEART RATE: 59 BPM

## 2025-04-23 DIAGNOSIS — C67.8 MALIGNANT NEOPLASM OF OVERLAPPING SITES OF BLADDER (HCC): Primary | ICD-10-CM

## 2025-04-23 LAB — SURGICAL PATHOLOGY REPORT: NORMAL

## 2025-04-23 PROCEDURE — NBSRV NON-BILLABLE SERVICE: Performed by: UROLOGY

## 2025-04-23 NOTE — OP NOTE
[id number] [place] [address]                            OPERATIVE REPORT      PATIENT NAME: SUNDAR PATINO               : 1955  MED REC NO: [id number]                          ROOM: [facility]  ACCOUNT NO: [id number]                       ADMIT DATE: 2025  PROVIDER: Bro Easley MD      DATE OF PROCEDURE:  2025    SURGEON:  Bro Easley MD    ASSISTANT:  None.    PREOPERATIVE DIAGNOSES:  Bladder tumor, history of bladder cancer.    POSTOPERATIVE DIAGNOSES:  Bladder tumor, history of bladder cancer.    PROCEDURES:  Cystoscopy, transurethral resection of a bladder tumor (medium, 3.5 cm in size).    ANESTHESIA:  General.    COMPLICATIONS:  None.    BLOOD LOSS:  Minimal.    INDICATIONS FOR PROCEDURE:  A 69-year-old white male with a past history of bladder cancer.  Recently, he was noted to have a papillary lesion in the posterior wall of his bladder along with a smaller one on the left lateral wall.  Given this, the above procedure was scheduled.  Risks and benefits were explained to the patient.  Informed consent was obtained.    DESCRIPTION OF PROCEDURE:  This white male was brought back to the operating room, positioned in the modified dorsal lithotomy position after general anesthesia was started.  He was sterilely prepped and draped in standard fashion.  A 24-Vincentian rigid resectoscope with a visual obturator was inserted into his urethra and advanced into his bladder.  Bladder was examined.  The posterior patch of abnormality was noted and also the left wall lesion.  I then advanced the electric loop and I resected both of these areas.  A total resection was approximately 3.5 cm in size.  I made sure to resect the entirety of both areas.  Once this was done, I re-examined the bladder.  No other lesions were noted.  I used the cautery to make sure there was adequate hemostasis.  Then, I removed the specimen with an

## 2025-04-23 NOTE — PROGRESS NOTES
Patient presents to office today per Dr. Easley, balloon was deflated and 22F catheter was removed.  Patient tolerated well with no complaints.

## 2025-05-01 ENCOUNTER — OFFICE VISIT (OUTPATIENT)
Dept: UROLOGY | Age: 70
End: 2025-05-01
Payer: MEDICARE

## 2025-05-01 VITALS
BODY MASS INDEX: 40.32 KG/M2 | DIASTOLIC BLOOD PRESSURE: 78 MMHG | SYSTOLIC BLOOD PRESSURE: 116 MMHG | TEMPERATURE: 97.8 F | HEART RATE: 68 BPM | WEIGHT: 288 LBS | OXYGEN SATURATION: 99 % | HEIGHT: 71 IN

## 2025-05-01 DIAGNOSIS — C67.8 MALIGNANT NEOPLASM OF OVERLAPPING SITES OF BLADDER (HCC): Primary | ICD-10-CM

## 2025-05-01 PROCEDURE — 3078F DIAST BP <80 MM HG: CPT | Performed by: UROLOGY

## 2025-05-01 PROCEDURE — 1123F ACP DISCUSS/DSCN MKR DOCD: CPT | Performed by: UROLOGY

## 2025-05-01 PROCEDURE — 3074F SYST BP LT 130 MM HG: CPT | Performed by: UROLOGY

## 2025-05-01 PROCEDURE — 1159F MED LIST DOCD IN RCRD: CPT | Performed by: UROLOGY

## 2025-05-01 PROCEDURE — 99214 OFFICE O/P EST MOD 30 MIN: CPT | Performed by: UROLOGY

## 2025-05-01 RX ORDER — SOTALOL HYDROCHLORIDE 80 MG/1
TABLET ORAL
COMMUNITY
Start: 2025-02-26

## 2025-05-01 ASSESSMENT — ENCOUNTER SYMPTOMS
GASTROINTESTINAL NEGATIVE: 1
EYE REDNESS: 0
COUGH: 0
ABDOMINAL PAIN: 0
VOMITING: 0
NAUSEA: 0
WHEEZING: 0
EYES NEGATIVE: 1
ALLERGIC/IMMUNOLOGIC NEGATIVE: 1
EYE PAIN: 0
COLOR CHANGE: 0
BACK PAIN: 0
RESPIRATORY NEGATIVE: 1
SHORTNESS OF BREATH: 0

## 2025-05-01 NOTE — PROGRESS NOTES
[facility] PHYSICIANS [facility], [facility]  [address]  [facility] [address]  Dept: [phone]    [facility] Office Note - Established    Patient:  George Melo  YOB: 1955  Date: 5/1/2025    The patient is a 69 y.o. male who presents todayfor evaluation of the following problems:   Chief Complaint   Patient presents with    Results     TURBT Results         HPI    Here for bladder cancer. Had turbt, path reviewed, non invasive. Urinating well.  Summary of old records: N/A    Additional History: N/A    Procedures Today: N/A    Urinalysis today:  No results found for this visit on 05/01/25.  Last several PSA's:  Lab Results   Component Value Date    PSA 2.39 02/04/2025    PSA 1.80 07/23/2024    PSA 2.45 11/29/2023     Last total testosterone:  No results found for: \"TESTOSTERONE\"    AUA Symptom Score (5/1/2025):                               Last BUN and creatinine:  Lab Results   Component Value Date    BUN 14 04/04/2025     Lab Results   Component Value Date    CREATININE 1.1 04/04/2025       Additional Lab/Culture results: none    Imaging Reviewed during this Office Visit: none  (results were independently reviewed by physician and radiology report verified)    PAST MEDICAL, FAMILY AND SOCIAL HISTORY UPDATE:  Past Medical History:   Diagnosis Date    Allergic rhinitis     Anxiety     Arthritis     Gilliam esophagus     RESOLVED    Basal cell carcinoma of left upper arm 02/09/2018    Bladder cancer (HCC)     started work up 6/2022    Bleeding ulcer 1985    duodenal perforation/ bleed    Chest tightness     Colon polyps     COPD (chronic obstructive pulmonary disease) (HCC)     COVID-19 09/23/2022    sore throat x 1 day; received Paxlovid    GERD (gastroesophageal reflux disease)     Gout     RT HAND    History of blood transfusion 1985    DUODENAL BLEED/ no reaction    Hyperlipidemia     Hypertension     Insomnia     Lung nodule     has

## 2025-05-12 NOTE — TELEPHONE ENCOUNTER
Writer called and tried to [facility] for patient to see if he would like to make and appt with dr munoz. He has another referral but already a alexander patient.   no

## 2025-06-24 ENCOUNTER — OFFICE VISIT (OUTPATIENT)
Dept: PULMONOLOGY | Age: 70
End: 2025-06-24
Payer: MEDICARE

## 2025-06-24 VITALS
HEART RATE: 59 BPM | HEIGHT: 71 IN | OXYGEN SATURATION: 99 % | BODY MASS INDEX: 40.04 KG/M2 | SYSTOLIC BLOOD PRESSURE: 138 MMHG | WEIGHT: 286 LBS | DIASTOLIC BLOOD PRESSURE: 73 MMHG

## 2025-06-24 DIAGNOSIS — J44.9 CHRONIC OBSTRUCTIVE PULMONARY DISEASE, UNSPECIFIED COPD TYPE (HCC): Primary | ICD-10-CM

## 2025-06-24 DIAGNOSIS — G47.33 OSA (OBSTRUCTIVE SLEEP APNEA): ICD-10-CM

## 2025-06-24 DIAGNOSIS — E66.01 SEVERE OBESITY (BMI 35.0-39.9) WITH COMORBIDITY (HCC): ICD-10-CM

## 2025-06-24 DIAGNOSIS — Z87.891 HISTORY OF SMOKING AT LEAST 1 PACK PER DAY FOR AT LEAST 30 YEARS: ICD-10-CM

## 2025-06-24 DIAGNOSIS — R91.1 LUNG NODULE: ICD-10-CM

## 2025-06-24 PROCEDURE — 99214 OFFICE O/P EST MOD 30 MIN: CPT | Performed by: INTERNAL MEDICINE

## 2025-06-24 PROCEDURE — 3078F DIAST BP <80 MM HG: CPT | Performed by: INTERNAL MEDICINE

## 2025-06-24 PROCEDURE — 3075F SYST BP GE 130 - 139MM HG: CPT | Performed by: INTERNAL MEDICINE

## 2025-06-24 PROCEDURE — 1159F MED LIST DOCD IN RCRD: CPT | Performed by: INTERNAL MEDICINE

## 2025-06-24 PROCEDURE — 1123F ACP DISCUSS/DSCN MKR DOCD: CPT | Performed by: INTERNAL MEDICINE

## 2025-06-24 NOTE — PROGRESS NOTES
upper/middle lobe is the same as it was before in the right upper on CT scan on 04/22/2022.  Pleuroparenchymal nodule or thickening is the same as it was before without much change.Low-dose CT of the chest was done on 04/22/2022 Showed stable right middle lobe nodule and left fissure nodule pleural-based upper lung nodule with pleural thickening there is stable without much change as compared to CT scan of the chest on 04/05/2021. Both are stable as compared to CT scan of the chest from April 2018 to sep 2018 and sep 2019.    He has pulmonary function test done on 06/0/24 and FEV1 is 57% predicted as compared to 2015 when his FEV1 was 74%.  His airway trapping is slightly better residual volume is 141 and diffusion capacity is normal 85%.    Last sleep study results seen moderate obstructive sleep apnea on the sleep study he is started on CPAP and April 2024.  He is doing much better is getting benefit from CPAP therapy he is compliant with CPAP therapy and he is on CPAP with oronasal mask with heated modification compliance data is seen and he has good compliance.     Assessment & Plan  1. Chronic Obstructive Pulmonary Disease (COPD).  He reports no significant changes in his breathing since the last visit. He experiences no emergencies, cough, or wheezing. He uses his albuterol rescue inhaler very rarely and continues to use Spiriva once daily in the morning. He is able to perform regular activities at home without issues. He will continue his current medication regimen.    2. Obstructive Sleep Apnea.  He uses his CPAP every night with full mask and heated humidification. Compliance data from 03/28/2025 to 06/17/2025 shows 100% compliance with an average usage of 9 hours and 24 minutes per night and an average AHI of 0.4 on a CPAP pressure of 6 cm. He reports good sleep quality and no daytime sleepiness. He will continue using his CPAP as currently set.    3. History of Lung Nodule.  A low-dose CT scan of the

## 2025-06-26 RX ORDER — ALBUTEROL SULFATE 90 UG/1
INHALANT RESPIRATORY (INHALATION)
Qty: 3 EACH | Refills: 3 | Status: SHIPPED | OUTPATIENT
Start: 2025-06-26

## 2025-06-26 NOTE — TELEPHONE ENCOUNTER
LAST VISIT: 6/24/25  NEXT VISIT: 1/13/26    Per last dictation patient is on this medication. Please sign for refill if ok. Thank you.

## 2025-08-06 ENCOUNTER — HOSPITAL ENCOUNTER (OUTPATIENT)
Age: 70
Setting detail: SPECIMEN
Discharge: HOME | End: 2025-08-06

## 2025-08-06 DIAGNOSIS — R31.0 GROSS HEMATURIA: Primary | ICD-10-CM

## 2025-08-06 DIAGNOSIS — R31.0 GROSS HEMATURIA: ICD-10-CM

## 2025-08-06 LAB — PSA SERPL-MCNC: 1.45 NG/ML (ref 0–4)

## 2025-08-14 ENCOUNTER — PROCEDURE VISIT (OUTPATIENT)
Dept: UROLOGY | Age: 70
End: 2025-08-14
Payer: MEDICARE

## 2025-08-14 DIAGNOSIS — C67.8 MALIGNANT NEOPLASM OF OVERLAPPING SITES OF BLADDER (HCC): Primary | ICD-10-CM

## 2025-08-14 PROCEDURE — 52000 CYSTOURETHROSCOPY: CPT | Performed by: UROLOGY

## (undated) DEVICE — FORCEPS BX L240CM JAW DIA2.8MM L CAP W/ NDL MIC MESH TOOTH

## (undated) DEVICE — DEFENDO AIR WATER SUCTION AND BIOPSY VALVE KIT FOR  OLYMPUS: Brand: DEFENDO AIR/WATER/SUCTION AND BIOPSY VALVE

## (undated) DEVICE — SOLUTION IRRIG 3000ML 0.9% SOD CHL USP UROMATIC PLAS CONT

## (undated) DEVICE — SOLUTION IRRIG 3000ML STRL H2O USP UROMATIC PLAS CONT

## (undated) DEVICE — DRESSING,GAUZE,XEROFORM,CURAD,1"X8",ST: Brand: CURAD

## (undated) DEVICE — BLADDER EVACUATOR: Brand: UROVAC BLADDER EVACUATOR

## (undated) DEVICE — EVACUATOR URO BLDR W/ ADPT UROVAC

## (undated) DEVICE — POLYP TRAP: Brand: TRAPEASE®

## (undated) DEVICE — DRAPE,REIN 53X77,STERILE: Brand: MEDLINE

## (undated) DEVICE — HF-RESECTION ELECTRODE PLASMALOOP LOOP, MEDIUM, 24 FR., 12°/16°, ESG TURIS: Brand: OLYMPUS

## (undated) DEVICE — SYRINGE CATH TIP 50ML

## (undated) DEVICE — ST CHARLES CYSTO PACK: Brand: MEDLINE INDUSTRIES, INC.

## (undated) DEVICE — GLOVE ORANGE PI 7 1/2   MSG9075

## (undated) DEVICE — ELECTRODE ES 3FR L105CM FLX FULG SHT TIP CYSTOSCOPE

## (undated) DEVICE — DISPOSABLE LAPAROSCOPIC CORDS, 1 PER POUCH: Brand: A&E MEDICAL / DISPOSABLE LAPAROSCOPIC CORDS

## (undated) DEVICE — DRAINBAG,ANTI-REFLUX TOWER,L/F,2000ML,LL: Brand: MEDLINE

## (undated) DEVICE — CATHETER,FOLEY,3-WAY,22FR,30ML,100%SILI: Brand: MEDLINE

## (undated) DEVICE — PLUG,CATHETER,DRAINAGE PROTECTOR,TUBE: Brand: MEDLINE

## (undated) DEVICE — SNARE ENDOSCP POLYP MED STD AD 2.4X27X240 CM 2.8 MM OVL SENS

## (undated) DEVICE — FORCEPS BX L240CM WRK CHN 2.8MM STD CAP W/ NDL MIC MESH

## (undated) DEVICE — GLOVE ORANGE PI 8   MSG9080

## (undated) DEVICE — [TOMCAT CUTTER, ARTHROSCOPIC SHAVER BLADE,  DO NOT RESTERILIZE,  DO NOT USE IF PACKAGE IS DAMAGED,  KEEP DRY,  KEEP AWAY FROM SUNLIGHT]: Brand: FORMULA

## (undated) DEVICE — Y-TYPE TUR/BLADDER IRRIGATION SET, REGULATING CLAMP

## (undated) DEVICE — SOLUTION IRRIG 1000ML STRL H2O USP PLAS POUR BTL

## (undated) DEVICE — CATHETER URETH 24FR BLLN 30CC SIL ALLY W/ SIL HYDRGEL 3 W F

## (undated) DEVICE — GLOVE SURG SZ 65 THK91MIL LTX FREE SYN POLYISOPRENE

## (undated) DEVICE — PACK PROCEDURE SURG CYSTO SVMMC LF

## (undated) DEVICE — ST CHARLES CYSTO: Brand: MEDLINE INDUSTRIES, INC.

## (undated) DEVICE — GUIDEWIRE URO L150CM DIA0.035IN STIFF NIT HYDRPHLC STR TIP

## (undated) DEVICE — STRAP,CATHETER,ELASTIC,HOOK&LOOP: Brand: MEDLINE

## (undated) DEVICE — DRAPE,UNDRBUT,WHT GRAD PCH,CAPPORT,20/CS: Brand: MEDLINE

## (undated) DEVICE — GLOVE ORTHO 8   MSG9480

## (undated) DEVICE — ELECTRODE PT RET AD L9FT HI MOIST COND ADH HYDRGEL CORDED

## (undated) DEVICE — JELLY,LUBE,STERILE,FLIP TOP,TUBE,2-OZ: Brand: MEDLINE

## (undated) DEVICE — SNARE ENDOSCP L240CM LOOP W13MM DIA2.4MM SHT THROW SM OVL

## (undated) DEVICE — SINGLE PORT MANIFOLD: Brand: NEPTUNE 2

## (undated) DEVICE — SYRINGE,PISTON,IRRIGATION,60ML,STERILE: Brand: MEDLINE

## (undated) DEVICE — MERCY HEALTH ST CHARLES: Brand: MEDLINE INDUSTRIES, INC.

## (undated) DEVICE — GOWN,SIRUS,NONRNF,SETINSLV,XL,20/CS: Brand: MEDLINE

## (undated) DEVICE — PACK ARTHRO W PCH

## (undated) DEVICE — SOLUTION IV IRRIG LACTATED RINGERS 3000ML 2B7487

## (undated) DEVICE — COUNTER NDL 10 COUNT HLD 20 FOAM BLK SGL MAG

## (undated) DEVICE — SUTURE ETHLN SZ 3-0 L18IN NONABSORBABLE BLK FS-1 L24MM 3/8 663H

## (undated) DEVICE — HYPODERMIC SAFETY NEEDLE: Brand: MAGELLAN

## (undated) DEVICE — BITEBLOCK 54FR W/ DENT RIM BLOX

## (undated) DEVICE — SOLUTION IRRIG 1000ML H2O PIC PLAS SHATTERPROOF CONTAINER

## (undated) DEVICE — CORD LAPAROSCOPIC MONOPOLAR

## (undated) DEVICE — SYRINGE MED 50ML LUERLOCK TIP

## (undated) DEVICE — TUBING, SUCTION, 9/32" X 20', STRAIGHT: Brand: MEDLINE INDUSTRIES, INC.

## (undated) DEVICE — CATHETER URETH 22FR BLLN 30CC 3 W F SPEC INF CTRL BARDX

## (undated) DEVICE — ZIMMER® STERILE DISPOSABLE TOURNIQUET CUFF WITH PLC, DUAL PORT, SINGLE BLADDER, 30 IN. (76 CM)

## (undated) DEVICE — GOWN,POLY REINFORCED,LG: Brand: MEDLINE

## (undated) DEVICE — CORD ES L10FT BLU MPLR FT SWCH DISP ACMI

## (undated) DEVICE — GLOVE ORANGE PI 7   MSG9070

## (undated) DEVICE — SOL IRR SOD CHL 0.9% TITAN XL CNTNR 3000ML

## (undated) DEVICE — PROTECTOR ULN NRV PUR FOAM HK LOOP STRP ANATOMICALLY

## (undated) DEVICE — PADDING CAST W6INXL4YD POLY POR SPUN DACRON SYN VERSATILE

## (undated) DEVICE — GARMENT COMPR STD FOR 17IN CALF UNIF THER FLOTRN

## (undated) DEVICE — SOLUTION SCRB 4OZ 4% CHG H2O AIDED FOR PREOPERATIVE SKIN

## (undated) DEVICE — ENDO KIT W/SYRINGE: Brand: MEDLINE INDUSTRIES, INC.